# Patient Record
Sex: FEMALE | Race: OTHER
[De-identification: names, ages, dates, MRNs, and addresses within clinical notes are randomized per-mention and may not be internally consistent; named-entity substitution may affect disease eponyms.]

---

## 2020-05-06 ENCOUNTER — APPOINTMENT (OUTPATIENT)
Dept: HEMATOLOGY ONCOLOGY | Facility: CLINIC | Age: 60
End: 2020-05-06
Payer: COMMERCIAL

## 2020-05-06 ENCOUNTER — TRANSCRIPTION ENCOUNTER (OUTPATIENT)
Age: 60
End: 2020-05-06

## 2020-05-06 VITALS
WEIGHT: 192 LBS | TEMPERATURE: 98.6 F | SYSTOLIC BLOOD PRESSURE: 131 MMHG | OXYGEN SATURATION: 98 % | BODY MASS INDEX: 35.33 KG/M2 | HEIGHT: 62 IN | RESPIRATION RATE: 18 BRPM | HEART RATE: 71 BPM | DIASTOLIC BLOOD PRESSURE: 85 MMHG

## 2020-05-06 DIAGNOSIS — Z86.39 PERSONAL HISTORY OF OTHER ENDOCRINE, NUTRITIONAL AND METABOLIC DISEASE: ICD-10-CM

## 2020-05-06 DIAGNOSIS — Z80.1 FAMILY HISTORY OF MALIGNANT NEOPLASM OF TRACHEA, BRONCHUS AND LUNG: ICD-10-CM

## 2020-05-06 DIAGNOSIS — Z86.59 PERSONAL HISTORY OF OTHER MENTAL AND BEHAVIORAL DISORDERS: ICD-10-CM

## 2020-05-06 DIAGNOSIS — Z78.9 OTHER SPECIFIED HEALTH STATUS: ICD-10-CM

## 2020-05-06 PROCEDURE — 99205 OFFICE O/P NEW HI 60 MIN: CPT

## 2020-05-06 NOTE — PHYSICAL EXAM
[Fully active, able to carry on all pre-disease performance without restriction] : Status 0 - Fully active, able to carry on all pre-disease performance without restriction [Normal] : affect appropriate [de-identified] : colostomy with periinscion hernia  [de-identified] : mild hand foot syndrome

## 2020-05-06 NOTE — HISTORY OF PRESENT ILLNESS
[0 - No Distress] : Distress Level: 0 [de-identified] : This is a 60 y/o woman wiiht a hx fo stage 4 colon cancer , Kras wt, MSI stable, rectal and sigmoid primary , found to have peritoneal disease at time  of initial staging. \par s/p folfirinox avastin originally then irinotecan dropped for toxicity . S/p FOLFOX avastin, x6 months now on xeloda alone, avastin has been held due to hypertension. \par Patient underwent resection for the 2 primaries as well as debulking in oct 2018, with dr way at [place]. Pathology revealed, complete response in rectum but residual moderately differentiated adenocarcinoma of sigmoid, omentum positive for adeno and right ovary positive for metasatic colon cancer . No perforation was note but pos for perineuural invasion. \par rectum ypT1No\par sigmoid ypT3No M1c \par \par patient has been YOLIS , colonoscopy may 2019 one polyp. \par last ct scan negative for mets, + fatty liver feb 2020 \par  [de-identified] : Feeling well ,  had covid 19 but she never got sick at all! \par mild nausea at times but controlled with compazine or zofran \par \par occ loose stool \par cont on xeloda , some occ burning of hands and feet \par

## 2020-05-06 NOTE — REVIEW OF SYSTEMS
[Negative] : Allergic/Immunologic [FreeTextEntry7] : occ diarrhea  [FreeTextEntry9] : hand foot syndrome

## 2020-05-06 NOTE — REASON FOR VISIT
[Initial Consultation] : an initial consultation [FreeTextEntry2] : colon  and rectal cancer , on xeloda , new to this practice

## 2020-05-06 NOTE — ASSESSMENT
[FreeTextEntry1] : This is a 58 y/o woman with a hx of metastatic colorectal cancer, s/p hemicolectomy and debulking procedure. Kras wildtype, MSI stable \par s/p folfox(neoadjuvant)  and in joseph of avastin due to hypertension. Now on maintnace xeloda with out evidence of disease\par \par 1) colon cancer \par doing well YOLIS \par tolerating xeloda well \par repeat ct scan to confirm response \par may 18, 2020 going for repeat colonoscopy \par last cea normal repeat now \par \par \par 2) hand foot syndrome \par stable \par \par 3) vit d def \par cont vit d check level \par \par 4) hypertesnion \par well controlled \par follow up with pcp \par \par 5) incontiance \par cont "medication" prescribed by urology \par improved \par \par 6) neuropathy \par improved \par \par 7) iron def \par hb stable \par repeat levels \par \par 8) b12 def \par cont b12 oral \par repeat levels today \par \par 9) arm pain - improved with PT\par \par 10 health maintnance \par mammogram and pap up todate

## 2020-06-30 ENCOUNTER — APPOINTMENT (OUTPATIENT)
Dept: HEMATOLOGY ONCOLOGY | Facility: CLINIC | Age: 60
End: 2020-06-30
Payer: COMMERCIAL

## 2020-06-30 VITALS
RESPIRATION RATE: 18 BRPM | SYSTOLIC BLOOD PRESSURE: 139 MMHG | DIASTOLIC BLOOD PRESSURE: 87 MMHG | TEMPERATURE: 98.2 F | OXYGEN SATURATION: 98 % | BODY MASS INDEX: 34.6 KG/M2 | WEIGHT: 188 LBS | HEIGHT: 62 IN | HEART RATE: 79 BPM

## 2020-06-30 PROCEDURE — 99215 OFFICE O/P EST HI 40 MIN: CPT

## 2020-06-30 NOTE — ASSESSMENT
[FreeTextEntry1] : This is a 60 y/o woman with a hx of metastatic colorectal cancer, s/p hemicolectomy and debulking procedure. Kras wildtype, MSI stable \par s/p folfox(neoadjuvant)  and in joseph of avastin due to hypertension. Now on maintnace xeloda with out evidence of disease\par \par 1) colon cancer \par doing well overall no new symptoms \par tolerating xeloda well \par colonscopy - polyp found may 2020 \par ct scan june 2020 shows some incresae in RP nodes, ? if truly progression versus slice variaton \par petct ordered to further delineate whether this is progression , discussed possible false pos and false neg \par also discussed the need to follow on serial scans \par check cea , if increasing most likely progression \par get foundation testing for genetic mutations to help with treatment (MSI, KRAS< BRAF NRAF etc) \par discussed options for treatment on progression: including adding avastin to xeloda versus change to FOLFIRI versus egfr related treatment if kras and braf and nraf are wildtype\par \par \par \par 2) hand foot syndrome \par stable \par \par 3) vit d def \par cont vit d check level \par \par 4) hypertesnion \par well controlled \par follow up with pcp \par \par 5) incontiance \par cont "medication" prescribed by urology \par improved \par \par 6) neuropathy \par improved \par \par 7) iron def \par hb stable \par repeat levels \par \par 8) b12 def \par cont b12 oral \par repeat levels today \par \par 9) arm pain - improved with PT\par \par 10 health maintnance \par mammogram and pap up todate  \par \par dw patient and spouse at length

## 2020-06-30 NOTE — PHYSICAL EXAM
[Restricted in physically strenuous activity but ambulatory and able to carry out work of a light or sedentary nature] : Status 1- Restricted in physically strenuous activity but ambulatory and able to carry out work of a light or sedentary nature, e.g., light house work, office work [Obese] : obese [Normal] : affect appropriate [de-identified] : colostomy with  hernia  [de-identified] : mild hand foot syndrome

## 2020-06-30 NOTE — REVIEW OF SYSTEMS
[Negative] : Allergic/Immunologic [FreeTextEntry2] : weight loss  [FreeTextEntry7] : occ diarrhea is stable  [FreeTextEntry9] : back pain resolved

## 2020-06-30 NOTE — HISTORY OF PRESENT ILLNESS
[de-identified] : This is a 60 y/o woman wiiht a hx fo stage 4 colon cancer , Kras wt, MSI stable, rectal and sigmoid primary , found to have peritoneal disease at time  of initial staging. \par s/p folfirinox avastin originally then irinotecan dropped for toxicity . S/p FOLFOX avastin, x6 months now on xeloda alone, avastin has been held due to hypertension. \par Patient underwent resection for the 2 primaries as well as debulking in oct 2018, with dr way at [place]. Pathology revealed, complete response in rectum but residual moderately differentiated adenocarcinoma of sigmoid, omentum positive for adeno and right ovary positive for metasatic colon cancer . No perforation was note but pos for perineuural invasion. \par rectum ypT1No\par sigmoid ypT3No M1c \par \par patient has been YOLIS , colonoscopy may 2019 one polyp. \par last ct scan negative for mets, + fatty liver feb 2020 \par  [de-identified] : cea is 3.8 up from 2  at last visit , today is pending \par ct scan june 2020 shows slight increase in adenopathy 8 mm to 1.1 cm \par did have some pain in left abd pain but was doing leg raising , better now \par loosing weight poor appetite , pot helping a lot \par saw dr way . \par \par vominting 2 times per week , just weeks off \par diarrhea stable\par stamina better  [3 - Distress Level] : Distress Level: 3

## 2020-07-23 ENCOUNTER — APPOINTMENT (OUTPATIENT)
Dept: HEMATOLOGY ONCOLOGY | Facility: CLINIC | Age: 60
End: 2020-07-23
Payer: COMMERCIAL

## 2020-07-23 VITALS
SYSTOLIC BLOOD PRESSURE: 142 MMHG | WEIGHT: 184.25 LBS | OXYGEN SATURATION: 99 % | DIASTOLIC BLOOD PRESSURE: 84 MMHG | RESPIRATION RATE: 18 BRPM | HEART RATE: 77 BPM | BODY MASS INDEX: 33.9 KG/M2 | TEMPERATURE: 98.1 F | HEIGHT: 62 IN

## 2020-07-23 PROCEDURE — 99214 OFFICE O/P EST MOD 30 MIN: CPT

## 2020-07-23 NOTE — REASON FOR VISIT
[Follow-Up Visit] : a follow-up [Spouse] : spouse [FreeTextEntry2] : colon cancer here to review scans , no new complaints

## 2020-07-23 NOTE — ASSESSMENT
[FreeTextEntry1] : This is a 60 y/o woman with a hx of metastatic colorectal cancer, s/p hemicolectomy and debulking procedure. Kras wildtype, MSI stable \par s/p folfox(neoadjuvant)  and in joseph of avastin due to hypertension. Now on maintnace xeloda with out evidence of disease\par \par 1) colon cancer \par doing well overall no new symptoms \par tolerating xeloda well \par colonscopy - polyp found may 2020 \par ct scan june 2020 revealing for slight increase in RP nodes \par petct july 2020 negative for any uptake \par cea is increasing consistant with progression of disease \par reviewed foundation testing which found a BRCA mutation , should respond well to platinums and also possible parp(? trial) \par the invitae testiing does not indcate a brca germline, but did find 2 RODOLFO and one MSH6. (all are VUS) \par dw patient options : continued close observation, add avastin to xeloda, change to oxaliplatin, change to folfiri \par need to get all path from prior surgeries as well  called yair \par cont xeloda add avastin, dw patient the risk of hypertension, dvt /pe other clotting events , death , obstruciton/perforation , fatal bleeding \par repeat cea today and rest of labs 2) hand foot syndrome \par stable \par \par 3) vit d def \par cont vit d check level \par \par 4) hypertesnion \par well controlled \par follow up with pcp \par \par 5) incontiance \par cont "medication" prescribed by urology \par improved \par \par 6) neuropathy \par improved \par \par 7) iron def \par hb stable \par repeat levels \par \par 8) b12 def \par cont b12 oral \par repeat levels today \par \par 9) arm pain - improved with PT\par \par 10 health maintnance \par mammogram and pap up todate  \par \par dw patient and spouse at length , follow up one week for avastin and 4 weeks md and avastin \par covid testing today

## 2020-07-23 NOTE — PHYSICAL EXAM
[Restricted in physically strenuous activity but ambulatory and able to carry out work of a light or sedentary nature] : Status 1- Restricted in physically strenuous activity but ambulatory and able to carry out work of a light or sedentary nature, e.g., light house work, office work [Obese] : obese [Normal] : affect appropriate [de-identified] : colostomy with  hernia  [de-identified] : mild hand foot syndrome

## 2020-07-23 NOTE — HISTORY OF PRESENT ILLNESS
[0 - No Distress] : Distress Level: 0 [de-identified] : This is a 58 y/o woman wiiht a hx fo stage 4 colon cancer , Kras wt, MSI stable, rectal and sigmoid primary , found to have peritoneal disease at time  of initial staging. \par s/p folfirinox avastin originally then irinotecan dropped for toxicity . S/p FOLFOX avastin, x6 months now on xeloda alone, avastin has been held due to hypertension. \par Patient underwent resection for the 2 primaries as well as debulking in oct 2018, with dr way at [place]. Pathology revealed, complete response in rectum but residual moderately differentiated adenocarcinoma of sigmoid, omentum positive for adeno and right ovary positive for metasatic colon cancer . No perforation was note but pos for perineuural invasion. \par rectum ypT1No\par sigmoid ypT3No M1c \par \par patient has been YOLIS , colonoscopy may 2019 one polyp. \par last ct scan negative for mets, + fatty liver feb 2020 \par \par cea is 3.8 which is up in may \par ct scan june 2020 shows slight increase in adenopathy 8 mm to 1.1 cm  [de-identified] : PETCT - done in july 2020 - no uptake in nodes in retroperitoneum \par \par cea is increasing to 6 \par \par Patient denies any new complaints , still having some stomach upset from time to time and some fatigue \par but no new pain in abd or back \par \par Foundation testing shows BRCA 2 mutation and also p53 mutation \par \par [facility] - RODOLFO mutation x2(2 diff heterogenous mutations uncertain sig ) , MSH6 heterogenous (undetermined sig)

## 2020-08-20 ENCOUNTER — APPOINTMENT (OUTPATIENT)
Dept: HEMATOLOGY ONCOLOGY | Facility: CLINIC | Age: 60
End: 2020-08-20
Payer: COMMERCIAL

## 2020-08-20 VITALS
BODY MASS INDEX: 32.37 KG/M2 | HEART RATE: 81 BPM | WEIGHT: 177 LBS | TEMPERATURE: 98 F | OXYGEN SATURATION: 98 % | SYSTOLIC BLOOD PRESSURE: 126 MMHG | DIASTOLIC BLOOD PRESSURE: 70 MMHG

## 2020-08-20 DIAGNOSIS — Z00.00 ENCOUNTER FOR GENERAL ADULT MEDICAL EXAMINATION W/OUT ABNORMAL FINDINGS: ICD-10-CM

## 2020-08-20 PROCEDURE — 99214 OFFICE O/P EST MOD 30 MIN: CPT

## 2020-08-20 NOTE — REVIEW OF SYSTEMS
[Negative] : Heme/Lymph [FreeTextEntry2] : increased fatigue  [FreeTextEntry7] : abd cramping , nausea  [FreeTextEntry9] : restless legs

## 2020-08-20 NOTE — REASON FOR VISIT
[Follow-Up Visit] : a follow-up [Spouse] : spouse [FreeTextEntry2] : colon cancer  on xeloda and avastin , here for clearence , complain of nausea

## 2020-08-20 NOTE — PHYSICAL EXAM
[Restricted in physically strenuous activity but ambulatory and able to carry out work of a light or sedentary nature] : Status 1- Restricted in physically strenuous activity but ambulatory and able to carry out work of a light or sedentary nature, e.g., light house work, office work [Obese] : obese [Normal] : grossly intact [de-identified] : colostomy with  hernia  [de-identified] : mild hand foot syndrome

## 2020-08-20 NOTE — ASSESSMENT
[FreeTextEntry1] : This is a 58 y/o woman with a hx of metastatic colorectal cancer, s/p hemicolectomy and debulking procedure. Kras wildtype, MSI stable \par s/p folfox(neoadjuvant)  and in joseph of avastin due to hypertension. Now on maintnace xeloda with out evidence of disease\par foundation testing which found a BRCA mutation \par the [facility] testiing does not indcate a brca germline, but did find 2 RODOLFO and one MSH6. (all are VUS) \par \par 1) colon cancer \par doing well overall no new symptoms \par colonscopy - polyp found may 2020 \par ct scan june 2020 revealing for slight increase in RP nodes \par petct july 2020 negative for any uptake \par cea is increased to 10 last visit , repeat today \par started avastin with xeloda last visit today due for cycle 2 \par tolerating well so far \par proceed with avastin cycle 2 \par \par 2) hand foot syndrome \par stable \par \par 3) vit d def \par cont vit d check level \par \par 4) hypertesnion \par well controlled \par moniters at home , will follow closely on avastin \par \par 5) incontiance \par stable \par \par 6) neuropathy \par stable \par \par 7) iron def \par hb stable \par ferritin level dropping last check \par start venofer with avastin and take iron every other day po \par \par 8) b12 def \par cont b12 oral \par \par \par 9) health maintnance \par mammogram due for ,  pap up todate  \par \par 10) restless legs \par start iron \par \par 11) anticipatory nausea \par start ativan \par \par dw patient and spouse at length \par follow up in 3 weeks

## 2020-08-20 NOTE — HISTORY OF PRESENT ILLNESS
[0 - No Distress] : Distress Level: 0 [Cycle: ___] : Cycle: [unfilled] [de-identified] : This is a 60 y/o woman wiiht a hx fo stage 4 colon cancer , Kras wt, MSI stable, rectal and sigmoid primary , found to have peritoneal disease at time  of initial staging. \par s/p folfirinox avastin originally then irinotecan dropped for toxicity . S/p FOLFOX avastin, x6 months now on xeloda alone, avastin has been held due to hypertension. \par Patient underwent resection for the 2 primaries as well as debulking in oct 2018, with dr way at [place]. Pathology revealed, complete response in rectum but residual moderately differentiated adenocarcinoma of sigmoid, omentum positive for adeno and right ovary positive for metasatic colon cancer . No perforation was note but pos for perineuural invasion. \par rectum ypT1No\par sigmoid ypT3No M1c \par \par patient has been YOLIS , colonoscopy may 2019 one polyp. \par last ct scan negative for mets, + fatty liver feb 2020 \par \par cea is 3.8 which is up in may \par ct scan june 2020 shows slight increase in adenopathy 8 mm to 1.1 cm \par \par PETCT - done in july 2020 - no uptake in nodes in retroperitoneum \par Foundation testing shows BRCA 2 mutation and also p53 mutation \par \par [facility] - RODOLFO mutation x2(2 diff heterogenous mutations uncertain sig ) , MSH6 heterogenous (undetermined sig)  [de-identified] : here for cycle 2 of avastin \par feeling well over all \par back pain and joint pain in am , taking naprosyn and tylenol \par no bleeding \par bp stable \par abd pain better \par one day nausea after avastin , but none at home \par last night felt nausea \par contninues on xeloda \par order ativan

## 2020-09-10 ENCOUNTER — APPOINTMENT (OUTPATIENT)
Dept: HEMATOLOGY ONCOLOGY | Facility: CLINIC | Age: 60
End: 2020-09-10
Payer: COMMERCIAL

## 2020-09-10 VITALS
DIASTOLIC BLOOD PRESSURE: 68 MMHG | WEIGHT: 180 LBS | OXYGEN SATURATION: 98 % | BODY MASS INDEX: 33.13 KG/M2 | SYSTOLIC BLOOD PRESSURE: 118 MMHG | TEMPERATURE: 97.9 F | HEIGHT: 62 IN | HEART RATE: 75 BPM

## 2020-09-10 PROCEDURE — 99214 OFFICE O/P EST MOD 30 MIN: CPT

## 2020-09-10 NOTE — HISTORY OF PRESENT ILLNESS
[Cycle: ___] : Cycle: [unfilled] [0 - No Distress] : Distress Level: 0 [de-identified] : worse itching bilateral shins using barrier cream and oral benadryl , tried cerve and lots of other creams \par no other areas of rash or itching \par solifenacin for urine , not helping much \par bp is great today \par cea is better  last time \par vit d 54, ferritin 46 \par dentist last week infection , now on antibiotics, no pain , completing now \par \par \par \par  [de-identified] : This is a 58 y/o woman wiiht a hx fo stage 4 colon cancer , Kras wt, MSI stable, rectal and sigmoid primary , found to have peritoneal disease at time  of initial staging. \par s/p folfirinox avastin originally then irinotecan dropped for toxicity . S/p FOLFOX avastin, x6 months now on xeloda alone, avastin has been held due to hypertension. \par Patient underwent resection for the 2 primaries as well as debulking in oct 2018, with dr way at [place]. Pathology revealed, complete response in rectum but residual moderately differentiated adenocarcinoma of sigmoid, omentum positive for adeno and right ovary positive for metasatic colon cancer . No perforation was note but pos for perineuural invasion. \par rectum ypT1No\par sigmoid ypT3No M1c \par \par patient has been YOLIS , colonoscopy may 2019 one polyp. \par last ct scan negative for mets, + fatty liver feb 2020 \par \par cea is 3.8 which is up in may \par ct scan june 2020 shows slight increase in adenopathy 8 mm to 1.1 cm \par \par PETCT - done in july 2020 - no uptake in nodes in retroperitoneum \par Foundation testing shows BRCA 2 mutation and also p53 mutation \par \par [facility] - RODOLFO mutation x2(2 diff heterogenous mutations uncertain sig ) , MSH6 heterogenous (undetermined sig)

## 2020-09-10 NOTE — REVIEW OF SYSTEMS
[Negative] : Endocrine [Skin Rash] : skin rash [Fatigue] : fatigue [Fever] : no fever [FreeTextEntry7] : abd cramping , nausea  [FreeTextEntry2] : increased fatigue  [FreeTextEntry9] : restless legs

## 2020-09-10 NOTE — PHYSICAL EXAM
[Restricted in physically strenuous activity but ambulatory and able to carry out work of a light or sedentary nature] : Status 1- Restricted in physically strenuous activity but ambulatory and able to carry out work of a light or sedentary nature, e.g., light house work, office work [Obese] : obese [Normal] : affect appropriate [de-identified] : mild hand foot syndrome  [de-identified] : colostomy with  hernia  [de-identified] : follicular rash with areas of excoriation on shins bilat right worse then left

## 2020-09-10 NOTE — ASSESSMENT
[FreeTextEntry1] : This is a 58 y/o woman with a hx of metastatic colorectal cancer, s/p hemicolectomy and debulking procedure. Kras wildtype, MSI stable \par s/p folfox(neoadjuvant)  and in joseph of avastin due to hypertension. Now on maintnace xeloda with out evidence of disease\par foundation testing which found a BRCA mutation \par the inv[facility]ae testiing does not indcate a brca germline, but did find 2 RODOLFO and one MSH6. (all are VUS) \par \par 1) colon cancer \par doing well overall no new symptoms \par colonscopy - polyp found may 2020 \par ct scan june 2020 revealing for slight increase in RP nodes \par petct july 2020 negative for any uptake \par avastin was added to xeloda for the slight progression \par cea improved from 10 to 4 with addition of avastin \par proceed with avastin cycle 3 \par cont xeloda \par plan for repeat scan after next cycle \par again educated patient and family regarding the side effects of avastin- risk of bleeding wound healing issues hypertension and thrombosis \par repeat cea today \par \par \par 2) hand foot syndrome \par stable \par \par 3) vit d def \par cont vit d check level \par \par 4) hypertesnion \par well controlled , stable today \par moniters at home , will follow closely on avastin \par \par 5) incontiance \par continues despite meds \par patietn advised to see dr wilfred mccray urology \par \par 6) neuropathy \par stable \par \par 7) iron def \par hb stable \par ferritin is 80's , on oral iron , toleraing well  \par \par 8) b12 def \par cont b12 oral \par \par \par 9) health maintnance \par mammogram due for ,  pap up todate  \par \par 10) restless legs \par on iron \par start mirapex \par \par 11) anticipatory nausea \par start ativan \par \par 12) rash \par seems to be a type of follicultiis \par start higher potency steroid cream and also start cleocin lotion for any infectious componant \par likley due to avastin \par if fails to improve will need derm eval \par \par dw patient and spouse at length \par follow up in 3 weeks

## 2020-09-11 LAB — CEA SERPL-MCNC: 3.1 NG/ML

## 2020-10-01 ENCOUNTER — APPOINTMENT (OUTPATIENT)
Dept: HEMATOLOGY ONCOLOGY | Facility: CLINIC | Age: 60
End: 2020-10-01
Payer: COMMERCIAL

## 2020-10-01 VITALS
SYSTOLIC BLOOD PRESSURE: 120 MMHG | HEIGHT: 62 IN | WEIGHT: 180 LBS | HEART RATE: 77 BPM | OXYGEN SATURATION: 97 % | RESPIRATION RATE: 15 BRPM | TEMPERATURE: 97.1 F | DIASTOLIC BLOOD PRESSURE: 70 MMHG | BODY MASS INDEX: 33.13 KG/M2

## 2020-10-01 PROCEDURE — 99214 OFFICE O/P EST MOD 30 MIN: CPT

## 2020-10-01 NOTE — ASSESSMENT
[FreeTextEntry1] : This is a 60 y/o woman with a hx of metastatic colorectal cancer, s/p hemicolectomy and debulking procedure. Kras wildtype, MSI stable \par s/p folfox(neoadjuvant)  and in joseph of avastin due to hypertension. Now on maintnace xeloda with out evidence of disease\par foundation testing which found a BRCA mutation \par the inv[facility]ae testiing does not indcate a brca germline, but did find 2 RODOLFO and one MSH6. (all are VUS) \par \par 1) colon cancer \par doing well overall no new symptoms \par colonscopy - polyp found may 2020 \par ct scan june 2020 revealing for slight increase in RP nodes \par petct july 2020 negative for any uptake \par avastin was added to xeloda for the slight progression \par cea improved from 10 to 4 with addition of avastin , now cea is normal \par repeat cea today \par repeat scans after this cycle before next cycle to verify response \par proceed with avastin cycle 4 today \par cont xeloda \par \par \par \par 2) hand foot syndrome \par stable \par \par 3) vit d def \par cont vit d check level \par \par 4) hypertesnion \par well controlled , stable today \par moniter  at home , will follow closely on avastin \par \par 5) incontiance \par continues despite meds \par patient  advised to see dr wilfred mccray urology \par \par 6) neuropathy \par stable \par \par 7) iron def \par hb stable \par ferritin is 80's , on oral iron , toleraing well  \par \par 8) b12 def \par cont b12 oral \par \par 9) health maintnance \par mammogram due for ,  pap up todate  \par \par 10) restless legs \par on iron \par start mirapex \par \par 11) anticipatory nausea \par start ativan \par \par 12) rash \par seems to be a type of follicultiis \par cont topical steroids and antibitoics \par \par dw patient and spouse at length \par follow up in 3 weeks

## 2020-10-01 NOTE — REASON FOR VISIT
[Follow-Up Visit] : a follow-up [Spouse] : spouse [FreeTextEntry2] : colon cancer  on xeloda and avastin , here for clearence , complain of nausea and rash

## 2020-10-01 NOTE — PHYSICAL EXAM
[Restricted in physically strenuous activity but ambulatory and able to carry out work of a light or sedentary nature] : Status 1- Restricted in physically strenuous activity but ambulatory and able to carry out work of a light or sedentary nature, e.g., light house work, office work [Obese] : obese [Normal] : affect appropriate [de-identified] : colostomy with  hernia  [de-identified] : mild hand foot syndrome  [de-identified] : hyperpitgmnted areas no erythema or drainage shins bilat

## 2020-10-01 NOTE — HISTORY OF PRESENT ILLNESS
[Cycle: ___] : Cycle: [unfilled] [0 - No Distress] : Distress Level: 0 [de-identified] : This is a 60 y/o woman wiiht a hx fo stage 4 colon cancer , Kras wt, MSI stable, rectal and sigmoid primary , found to have peritoneal disease at time  of initial staging. \par s/p folfirinox avastin originally then irinotecan dropped for toxicity . S/p FOLFOX avastin, x6 months now on xeloda alone, avastin has been held due to hypertension. \par Patient underwent resection for the 2 primaries as well as debulking in oct 2018, with dr way at [place]. Pathology revealed, complete response in rectum but residual moderately differentiated adenocarcinoma of sigmoid, omentum positive for adeno and right ovary positive for metasatic colon cancer . No perforation was note but pos for perineuural invasion. \par rectum ypT1No\par sigmoid ypT3No M1c \par \par patient has been YOLIS , colonoscopy may 2019 one polyp. \par last ct scan negative for mets, + fatty liver feb 2020 \par \par cea is 3.8 which is up in may \par ct scan june 2020 shows slight increase in adenopathy 8 mm to 1.1 cm \par \par PETCT - done in july 2020 - no uptake in nodes in retroperitoneum \par Foundation testing shows BRCA 2 mutation and also p53 mutation \par \par [facility] - RODOLFO mutation x2(2 diff heterogenous mutations uncertain sig ) , MSH6 heterogenous (undetermined sig) \par \par \par sept 2020 \par vit d 54, ferritin 46 \par  [de-identified] : rash is better  seeing derm next week  tues \par still ithcing a bit , taking cream \par monday will talk to dentist \par tomorrow mammogram \par urine frequency \par bp has been fine \par off antibiotics \par no new pain \par no bleeding  of stool \par cea last month is now normal \par \par \par \par

## 2020-10-22 ENCOUNTER — APPOINTMENT (OUTPATIENT)
Dept: HEMATOLOGY ONCOLOGY | Facility: CLINIC | Age: 60
End: 2020-10-22
Payer: COMMERCIAL

## 2020-10-22 VITALS
RESPIRATION RATE: 18 BRPM | OXYGEN SATURATION: 97 % | WEIGHT: 179.38 LBS | HEIGHT: 62 IN | SYSTOLIC BLOOD PRESSURE: 121 MMHG | HEART RATE: 97 BPM | DIASTOLIC BLOOD PRESSURE: 80 MMHG | TEMPERATURE: 98.8 F | BODY MASS INDEX: 33.01 KG/M2

## 2020-10-22 PROCEDURE — 99214 OFFICE O/P EST MOD 30 MIN: CPT

## 2020-10-22 PROCEDURE — 99072 ADDL SUPL MATRL&STAF TM PHE: CPT

## 2020-10-22 NOTE — ASSESSMENT
[FreeTextEntry1] : This is a 58 y/o woman with a hx of metastatic colorectal cancer, s/p hemicolectomy and debulking procedure. Kras wildtype, MSI stable \par s/p folfox(neoadjuvant)  and in joseph of avastin due to hypertension. Now on maintnace xeloda with out evidence of disease\par foundation testing which found a BRCA mutation \par the invitae testiing does not indcate a brca germline, but did find 2 RODOLFO and one MSH6. (all are VUS) \par \par 1) colon cancer \par doing well , no new symptoms \par colonoscopy - polyp found may 2020 \par ct scan june 2020 revealing for slight increase in RP nodes \par petct july 2020 negative for any uptake \par avastin was added to xeloda for the slight progression \par cea improved from 10 to  now normal \par repeat cea today \par repeat scans next week  10/29/20 \par hold next dose for dental extraction on 11.20 .2020 \par tolerating chemo well discussed some of side effects \par cont xeloda \par proceed with avastin cycle 6 today \par \par 2) hand foot syndrome \par stable \par \par 3) vit d def \par cont vit d  \par \par 4) hypertension \par well controlled , stable today \par monitor  at home , will follow closely on Avastin \par \par 5) incontinence \par continues despite meds \par pt is scheduled to see Dr Flowers in Urology\par \par 6) neuropathy \par stable \par \par 7) iron def \par hb stable \par ferritin is 80's , on oral iron , tolerating well  \par \par 8) b12 def \par cont b12 oral \par \par 9) health maintenance \par mammogram is up to date,  pap up to date  \par \par 10) restless legs \par on iron \par start mirapex \par improved\par \par 11) anticipatory nausea \par start ativan \par cont sea bands \par cont zofran \par \par 12) rash \par folliculitis , s.p derm eval \par cont topical steroids and antibitoics \par \par d/w patient and spouse at length \par follow up in 3 weeks

## 2020-10-22 NOTE — PHYSICAL EXAM
[Restricted in physically strenuous activity but ambulatory and able to carry out work of a light or sedentary nature] : Status 1- Restricted in physically strenuous activity but ambulatory and able to carry out work of a light or sedentary nature, e.g., light house work, office work [Obese] : obese [Normal] : affect appropriate [de-identified] : colostomy, clean, functioning, surrounding skin intact, with  hernia  [de-identified] : mild hand foot syndrome  [de-identified] : hyperpitgmnted areas no erythema or drainage shins bilat

## 2020-10-22 NOTE — HISTORY OF PRESENT ILLNESS
[Cycle: ___] : Cycle: [unfilled] [0 - No Distress] : Distress Level: 0 [de-identified] : This is a 60 y/o woman wiiht a hx fo stage 4 colon cancer , Kras wt, MSI stable, rectal and sigmoid primary , found to have peritoneal disease at time  of initial staging. \par s/p folfirinox avastin originally then irinotecan dropped for toxicity . S/p FOLFOX avastin, x6 months now on xeloda alone, avastin has been held due to hypertension. \par Patient underwent resection for the 2 primaries as well as debulking in oct 2018, with dr way at [place]. Pathology revealed, complete response in rectum but residual moderately differentiated adenocarcinoma of sigmoid, omentum positive for adeno and right ovary positive for metasatic colon cancer . No perforation was note but pos for perineuural invasion. \par rectum ypT1No\par sigmoid ypT3No M1c \par \par patient has been YOLIS , colonoscopy may 2019 one polyp. \par last ct scan negative for mets, + fatty liver feb 2020 \par \par cea is 3.8 which is up in may \par ct scan june 2020 shows slight increase in adenopathy 8 mm to 1.1 cm \par \par PETCT - done in july 2020 - no uptake in nodes in retroperitoneum \par Foundation testing shows BRCA 2 mutation and also p53 mutation \par \par [facility] - RODOLFO mutation x2(2 diff heterogenous mutations uncertain sig ) , MSH6 heterogenous (undetermined sig) \par \par \par sept 2020 \par vit d 54, ferritin 46 \par  [de-identified] : \par rash is better  saw derm, was Rx'd topical clindaycin and ammonium lactate 12% cream\par Dentist is scheduled for 11/06/20 for an extraction and laser frenulectomy\par mammogram was done at [facility] in [place] on 10/02/20 and was negative\par urine has been okay without any new complaints \par bp has been fine \par no new pain \par no bleeding  of stool, bowel movements have been  more watery than usual, same in frequency though\par cea 09/09/2020 normal \par Next CT (chest/ab/pel) is scheduled for 10/29/20 at [facility] in [place]\par On 10/28/2020 she is scheduled to see Urology, Dr. German Flowers for urinary incontinence, which she has had since the colon surgery\par appeitite bad \par weight was down not eating well  1week and half , severe nausea  tried sea bands and worked \par \par

## 2020-10-22 NOTE — REVIEW OF SYSTEMS
[Fatigue] : fatigue [Skin Rash] : skin rash [Negative] : Allergic/Immunologic [Fever] : no fever [FreeTextEntry2] : increased fatigue  [FreeTextEntry7] : abd cramping , nausea occasional vomiting, taking Zofran and Compazine prn [FreeTextEntry9] : restless legs  better [de-identified] : improved  [de-identified] : pain in feet related to neuropathy, worse during the day and with activity

## 2020-10-23 LAB — CEA SERPL-MCNC: 3.4 NG/ML

## 2020-11-18 ENCOUNTER — APPOINTMENT (OUTPATIENT)
Dept: HEMATOLOGY ONCOLOGY | Facility: CLINIC | Age: 60
End: 2020-11-18
Payer: COMMERCIAL

## 2020-11-18 VITALS
DIASTOLIC BLOOD PRESSURE: 70 MMHG | SYSTOLIC BLOOD PRESSURE: 122 MMHG | TEMPERATURE: 98.7 F | WEIGHT: 179 LBS | HEIGHT: 62 IN | HEART RATE: 93 BPM | RESPIRATION RATE: 18 BRPM | OXYGEN SATURATION: 99 % | BODY MASS INDEX: 32.94 KG/M2

## 2020-11-18 DIAGNOSIS — E66.01 MORBID (SEVERE) OBESITY DUE TO EXCESS CALORIES: ICD-10-CM

## 2020-11-18 PROCEDURE — 99214 OFFICE O/P EST MOD 30 MIN: CPT

## 2020-11-18 NOTE — HISTORY OF PRESENT ILLNESS
[de-identified] : This is a 58 y/o woman wiiht a hx fo stage 4 colon cancer , Kras wt, MSI stable, rectal and sigmoid primary , found to have peritoneal disease at time  of initial staging. \par s/p folfirinox avastin originally then irinotecan dropped for toxicity . S/p FOLFOX avastin, x6 months now on xeloda alone, avastin has been held due to hypertension. \par Patient underwent resection for the 2 primaries as well as debulking in oct 2018, with dr way at [place]. Pathology revealed, complete response in rectum but residual moderately differentiated adenocarcinoma of sigmoid, omentum positive for adeno and right ovary positive for metasatic colon cancer . No perforation was note but pos for perineuural invasion. \par rectum ypT1No\par sigmoid ypT3No M1c \par \par patient has been YOLIS , colonoscopy may 2019 one polyp. \par last ct scan negative for mets, + fatty liver feb 2020 \par \par cea is 3.8 which is up in may \par ct scan june 2020 shows slight increase in adenopathy 8 mm to 1.1 cm \par \par PETCT - done in july 2020 - no uptake in nodes in retroperitoneum \par Foundation testing shows BRCA 2 mutation and also p53 mutation \par \par [facility] - RODOLFO mutation x2(2 diff heterogenous mutations uncertain sig ) , MSH6 heterogenous (undetermined sig) \par \par \par sept 2020 \par vit d 54, ferritin 46 \par mammogram was done at [facility] in [place] on 10/02/20 and was negative [de-identified] : saw urology and started on myrbetric urinary symptoms better \par cea is normal in oct 2020 \par no new symptoms \par ct scan in nov 2020 - stable RP nodes , new multiple nodules up to 4 mm in lungs bilaterally , compared to feb 2020 ( no chest done in june 2020) \par having tooth extracted on nov 20 , 2020 \par has been off chemo due to extraction \par \par

## 2020-11-18 NOTE — PHYSICAL EXAM
[Restricted in physically strenuous activity but ambulatory and able to carry out work of a light or sedentary nature] : Status 1- Restricted in physically strenuous activity but ambulatory and able to carry out work of a light or sedentary nature, e.g., light house work, office work [Obese] : obese [Normal] : affect appropriate [de-identified] : colostomy with  hernia  [de-identified] : mild hand foot syndrome  [de-identified] : hyperpitgmnted areas no erythema or drainage shins bilat

## 2020-11-18 NOTE — ASSESSMENT
[FreeTextEntry1] : This is a 60 y/o woman with a hx of metastatic colorectal cancer, s/p hemicolectomy and debulking procedure. Kras wildtype, MSI stable \par s/p folfox(neoadjuvant)  and in joseph of avastin due to hypertension. Now on maintnace xeloda with out evidence of disease\par foundation testing which found a BRCA mutation \par the invitae testiing does not indcate a brca germline, but did find 2 RODOLFO and one MSH6. (all are VUS) \par \par 1) colon cancer \par doing well , no new symptoms \par colonoscopy - polyp found may 2020 \par petct july 2020 negative \par avastin was added to xeloda for the slight progression (in rp nodes in june) \par cea improved from 10 to  now normal (since starting avastin) \par repeat cea today \par chemo is on hold for dental extraction \par ct scan reviewed from nov 2020 at length - the RP nodes are stable but there are new lung nodules , they are very small but are most consistant with mets, ct scan of chest was not done in june so it is possiible they could have been present at that time , petct may not have picked up bc they are so small \par dw patient patient and spouse options- including - cont current regimen  and repeat scan in 2 more doses (beginning of jan 2021) , versus change chemo to  add oxaliplatin(change to capeox) \par \par \par 2) hand foot syndrome \par stable \par \par 3) vit d def \par cont vit d  \par \par 4) hypertension \par stable \par monitor  at home , will follow closely on Avastin \par \par 5) incontinence \par followed up with dr mccray \par now on myrbetric improved \par will follow up with him soon \par \par 6) neuropathy \par stable \par \par 7) iron def \par hb stable \par ferritin is 80's , on oral iron , tolerating well  \par \par 8) b12 def \par cont b12 oral \par \par 9) health maintenance \par mammogram is up to date,  pap up to date  \par \par 10) restless legs \par on iron \par cont mirapex \par improved\par \par 11) anticipatory nausea \par cont sea bands \par \par 12) rash \par folliculitis , s.p derm eval \par cont topical steroids and antibitoics \par \par d/w patient and spouse at length \par follow up in 3 weeks

## 2020-11-23 LAB
CEA SERPL-MCNC: 3.3 NG/ML
FERRITIN SERPL-MCNC: 83 NG/ML
FOLATE SERPL-MCNC: 8 NG/ML
VIT B12 SERPL-MCNC: 757 PG/ML

## 2020-12-17 NOTE — PHYSICAL EXAM
[Restricted in physically strenuous activity but ambulatory and able to carry out work of a light or sedentary nature] : Status 1- Restricted in physically strenuous activity but ambulatory and able to carry out work of a light or sedentary nature, e.g., light house work, office work [Obese] : obese [Normal] : affect appropriate [de-identified] : colostomy with  hernia  [de-identified] : mild hand foot syndrome  [de-identified] : hyperpitgmnted areas no erythema or drainage shins bilat

## 2020-12-17 NOTE — HISTORY OF PRESENT ILLNESS
[de-identified] : This is a 60 y/o woman wiiht a hx fo stage 4 colon cancer , Kras wt, MSI stable, rectal and sigmoid primary , found to have peritoneal disease at time  of initial staging. \par s/p folfirinox avastin originally then irinotecan dropped for toxicity . S/p FOLFOX avastin, x6 months now on xeloda alone, avastin has been held due to hypertension. \par Patient underwent resection for the 2 primaries as well as debulking in oct 2018, with dr way at [place]. Pathology revealed, complete response in rectum but residual moderately differentiated adenocarcinoma of sigmoid, omentum positive for adeno and right ovary positive for metasatic colon cancer . No perforation was note but pos for perineuural invasion. \par rectum ypT1No\par sigmoid ypT3No M1c \par \par patient has been YOLIS , colonoscopy may 2019 one polyp. \par last ct scan negative for mets, + fatty liver feb 2020 \par \par cea is 3.8 which is up in may \par ct scan june 2020 shows slight increase in adenopathy 8 mm to 1.1 cm \par \par PETCT - done in july 2020 - no uptake in nodes in retroperitoneum \par Foundation testing shows BRCA 2 mutation and also p53 mutation \par \par Inv[facility] - RODOLFO mutation x2(2 diff heterogenous mutations uncertain sig ) , MSH6 heterogenous (undetermined sig) \par \par \par sept 2020 \par vit d 54, ferritin 46 \par mammogram was done at [facility] in [place] on 10/02/20 and was negative [de-identified] : saw urology and started on myrbetric urinary symptoms better \par cea is normal in oct 2020 \par no new symptoms \par ct scan in nov 2020 - stable RP nodes , new multiple nodules up to 4 mm in lungs bilaterally , compared to feb 2020 ( no chest done in june 2020) \par having tooth extracted on nov 20 , 2020 \par has been off chemo due to extraction \par \par

## 2020-12-18 ENCOUNTER — APPOINTMENT (OUTPATIENT)
Dept: HEMATOLOGY ONCOLOGY | Facility: CLINIC | Age: 60
End: 2020-12-18
Payer: COMMERCIAL

## 2020-12-21 ENCOUNTER — APPOINTMENT (OUTPATIENT)
Dept: HEMATOLOGY ONCOLOGY | Facility: CLINIC | Age: 60
End: 2020-12-21
Payer: COMMERCIAL

## 2020-12-21 VITALS
HEART RATE: 107 BPM | OXYGEN SATURATION: 96 % | HEIGHT: 62 IN | WEIGHT: 174 LBS | BODY MASS INDEX: 32.02 KG/M2 | TEMPERATURE: 98.5 F | RESPIRATION RATE: 18 BRPM

## 2020-12-21 VITALS — SYSTOLIC BLOOD PRESSURE: 130 MMHG | DIASTOLIC BLOOD PRESSURE: 60 MMHG

## 2020-12-21 PROCEDURE — 99214 OFFICE O/P EST MOD 30 MIN: CPT

## 2020-12-21 PROCEDURE — 99072 ADDL SUPL MATRL&STAF TM PHE: CPT

## 2020-12-21 NOTE — PHYSICAL EXAM
[Restricted in physically strenuous activity but ambulatory and able to carry out work of a light or sedentary nature] : Status 1- Restricted in physically strenuous activity but ambulatory and able to carry out work of a light or sedentary nature, e.g., light house work, office work [Obese] : obese [Normal] : affect appropriate [de-identified] : colostomy with  hernia  [de-identified] : mild hand foot syndrome  [de-identified] : hyperpitgmnted areas no erythema or drainage shins bilat

## 2020-12-21 NOTE — HISTORY OF PRESENT ILLNESS
[de-identified] : This is a 58 y/o woman wiiht a hx fo stage 4 colon cancer , Kras wt, MSI stable, rectal and sigmoid primary , found to have peritoneal disease at time  of initial staging. \par s/p folfirinox avastin originally then irinotecan dropped for toxicity . S/p FOLFOX avastin, x6 months now on xeloda alone, avastin has been held due to hypertension. \par Patient underwent resection for the 2 primaries as well as debulking in oct 2018, with dr way at [place]. Pathology revealed, complete response in rectum but residual moderately differentiated adenocarcinoma of sigmoid, omentum positive for adeno and right ovary positive for metasatic colon cancer . No perforation was note but pos for perineuural invasion. \par rectum ypT1No\par sigmoid ypT3No M1c \par \par patient has been YOLIS , colonoscopy may 2019 one polyp. \par last ct scan negative for mets, + fatty liver feb 2020 \par \par cea is 3.8 which is up in may \par ct scan june 2020 shows slight increase in adenopathy 8 mm to 1.1 cm \par \par PETCT - done in july 2020 - no uptake in nodes in retroperitoneum \par Foundation testing shows BRCA 2 mutation and also p53 mutation \par \par Inv[facility] - RODOLFO mutation x2(2 diff heterogenous mutations uncertain sig ) , MSH6 heterogenous (undetermined sig) \par \par \par sept 2020 \par vit d 54, ferritin 46 \par mammogram was done at [facility] in [place] on 10/02/20 and was negative\par ct scan in nov 2020 - stable RP nodes , new multiple nodules up to 4 mm in lungs bilaterally , compared to feb 2020 ( no chest done in june 2020)  [de-identified] : \par \par INTERVAL Hx \par had tooth extracted all went well no bleeding , had follow up , all is healing well (done in nov 2020) \par no sob or cough \par due for avastin today \par cont on mybetriq for urinary symptoms they are better \par nausea continues but better with seabands , better when off avastin \par rash is improved \par no abd pain or bowel changes \par \par

## 2020-12-21 NOTE — ASSESSMENT
[FreeTextEntry1] : This is a 60 y/o woman with a hx of metastatic colorectal cancer, s/p hemicolectomy and debulking procedure. Kras wildtype, MSI stable \par s/p folfox(neoadjuvant)  and in joseph of avastin due to hypertension. Now on maintnace xeloda with out evidence of disease\par foundation testing which found a BRCA mutation \par the invitae testiing does not indcate a brca germline, but did find 2 RODOLFO and one MSH6. (all are VUS) \par \par 1) colon cancer \par doing well , no new symptoms \par colonoscopy - polyp found may 2020 \par petct july 2020 negative \par avastin was added to xeloda for the slight progression (in rp nodes in june) , which improved cea from 10 to 3 \par ct scan  from nov 2020 at length - the RP nodes are stable but there are new lung nodules\par patient continues to be assympatomic \par avastin held due to dental procedure , now 4 weeks out healing well \par cont xeloda \par proceed with avastin \par repeat cea and cmp \par repeat scans in 2 cycles of avastin (6 weeks) \par \par \par 2) hand foot syndrome \par stable \par \par 3) vit d def \par cont vit d  \par \par 4) hypertension \par stable \par monitor  at home , will follow closely on Avastin \par \par 5) incontinence \par followed up with dr mccray \par con myrbetric \par \par 6) neuropathy \par stable \par \par 7) iron def \par hb stable \par ferritin is 80's , on oral iron , tolerating well  , cont to moniter \par \par 8) b12 def \par cont b12 oral \par \par 9) health maintenance \par mammogram is up to date,  pap up to date  \par \par 10) restless legs \par on iron \par cont mirapex \par improved\par \par 11) anticipatory nausea \par cont sea bands \par \par 12) rash \par folliculitis , s.p derm eval \par cont topical steroids and antibitoics \par \par d/w patient and spouse at length \par proceed with avastin \par follow up in 3 weeks

## 2021-01-11 ENCOUNTER — APPOINTMENT (OUTPATIENT)
Dept: HEMATOLOGY ONCOLOGY | Facility: CLINIC | Age: 61
End: 2021-01-11
Payer: COMMERCIAL

## 2021-01-11 VITALS
OXYGEN SATURATION: 98 % | RESPIRATION RATE: 18 BRPM | WEIGHT: 174 LBS | TEMPERATURE: 98.2 F | HEIGHT: 62 IN | DIASTOLIC BLOOD PRESSURE: 80 MMHG | HEART RATE: 83 BPM | BODY MASS INDEX: 32.02 KG/M2 | SYSTOLIC BLOOD PRESSURE: 145 MMHG

## 2021-01-11 PROCEDURE — 99214 OFFICE O/P EST MOD 30 MIN: CPT

## 2021-01-11 PROCEDURE — 99072 ADDL SUPL MATRL&STAF TM PHE: CPT

## 2021-01-11 NOTE — REASON FOR VISIT
[Follow-Up Visit] : a follow-up [FreeTextEntry2] : colon cancer here  for follow up  Have Your Skin Lesions Been Treated?: not been treated Is This A New Presentation, Or A Follow-Up?: Skin Lesions How Severe Is Your Skin Lesion?: mild

## 2021-01-11 NOTE — ASSESSMENT
[FreeTextEntry1] : This is a 60 y/o woman with a hx of metastatic colorectal cancer, s/p hemicolectomy and debulking procedure. Kras wildtype, MSI stable \par s/p folfox(neoadjuvant)  and in joseph of avastin due to hypertension. Now on maintnace xeloda with out evidence of disease\par foundation testing which found a BRCA mutation \par the invitae testiing does not indcate a brca germline, but did find 2 RODOLFO and one MSH6. (all are VUS) \par \par 1) colon cancer \par doing well overall\par colonoscopy - polyp found may 2020 \par petct july 2020 negative \par avastin was added to xeloda for the slight progression (in rp nodes in june) , which improved cea from 10 to 3 \par ct scan  from nov 2020 at length - the RP nodes are stable but there are new lung nodules\par patient continues to be assympatomic \par -Avastin was held due to dental procedure now back on Avastin\par -Continues to tolerate Xeloda Avastin very well\par -Patient is now having some cough with clear sputum but it seems to be associated with vaping however we need to obviously make sure that there is no progression of the lung disease\par -Advise repeat CT chest abdomen pelvis prior to next appointment\par -Repeat CEA today\par -Proceed with Avastin, continue Xeloda\par \par \par \par 2) hand foot syndrome \par stable \par \par 3) vit d def \par cont vit d  \par \par 4) hypertension \par Slight increase in blood pressure today however patient was nervous about her  not being present will repeat in the infusion center, if continues to increase will need follow-up with PCP\par \par 5) incontinence \par Advise follow-up with urology\par con myrbetric \par \par 6) neuropathy \par stable \par \par 7) iron def \par hb stable \par ferritin is 80's , on oral iron \par Repeat iron levels today\par \par 8) b12 def \par cont b12 oral, repeat B12 levels today\par \par 9) health maintenance \par mammogram is up to date,  pap up to date  \par \par 10) restless legs \par on iron \par cont mirapex \par improved\par \par 11) anticipatory nausea \par cont sea bands, helping significantly overall with nausea\par \par 12) rash \par folliculitis , s.p derm eval \par cont topical steroids and antibitoics \par \par d/w patient and spouse at length \par proceed with avastin \par follow up in 3 weeks

## 2021-01-11 NOTE — HISTORY OF PRESENT ILLNESS
[de-identified] : This is a 58 y/o woman wiiht a hx fo stage 4 colon cancer , Kras wt, MSI stable, rectal and sigmoid primary , found to have peritoneal disease at time  of initial staging. \par s/p folfirinox avastin originally then irinotecan dropped for toxicity . S/p FOLFOX avastin, x6 months now on xeloda alone, avastin has been held due to hypertension. \par Patient underwent resection for the 2 primaries as well as debulking in oct 2018, with dr way at [place]. Pathology revealed, complete response in rectum but residual moderately differentiated adenocarcinoma of sigmoid, omentum positive for adeno and right ovary positive for metasatic colon cancer . No perforation was note but pos for perineuural invasion. \par rectum ypT1No\par sigmoid ypT3No M1c \par \par patient has been YOLIS , colonoscopy may 2019 one polyp. \par last ct scan negative for mets, + fatty liver feb 2020 \par \par cea is 3.8 which is up in may \par ct scan june 2020 shows slight increase in adenopathy 8 mm to 1.1 cm \par \par PETCT - done in july 2020 - no uptake in nodes in retroperitoneum \par Foundation testing shows BRCA 2 mutation and also p53 mutation \par \par [facility] - RODOLFO mutation x2(2 diff heterogenous mutations uncertain sig ) , MSH6 heterogenous (undetermined sig) \par \par \par sept 2020 \par vit d 54, ferritin 46 \par mammogram was done at [facility] in [place] on 10/02/20 and was negative\par ct scan in nov 2020 - stable RP nodes , new multiple nodules up to 4 mm in lungs bilaterally , compared to feb 2020 ( no chest done in june 2020)  [de-identified] : \par seems to have of sputum ,clear  about a month , when laying down \par no cough no sob \par nausea better with seabands, taking more antinausea \par rash is better \par tooth is healed \par bowels ok \par myrbetriq  is helping a little , will tobias follow up after being on 3 weeks \par

## 2021-01-11 NOTE — REVIEW OF SYSTEMS
[Fatigue] : fatigue [Negative] : Allergic/Immunologic [FreeTextEntry2] : Mild fatigue [FreeTextEntry6] : Coughing up of clear sputum particularly in the morning, no dyspnea

## 2021-01-11 NOTE — PHYSICAL EXAM
[Restricted in physically strenuous activity but ambulatory and able to carry out work of a light or sedentary nature] : Status 1- Restricted in physically strenuous activity but ambulatory and able to carry out work of a light or sedentary nature, e.g., light house work, office work [Obese] : obese [Normal] : affect appropriate [de-identified] : colostomy with  hernia  [de-identified] : mild hand foot syndrome  [de-identified] : hyperpitgmnted areas no erythema or drainage shins bilat

## 2021-01-12 LAB
25(OH)D3 SERPL-MCNC: 48 NG/ML
ALBUMIN SERPL ELPH-MCNC: 4.5 G/DL
ALP BLD-CCNC: 66 U/L
ALT SERPL-CCNC: 29 U/L
ANION GAP SERPL CALC-SCNC: 15 MMOL/L
AST SERPL-CCNC: 41 U/L
BILIRUB SERPL-MCNC: 0.4 MG/DL
BUN SERPL-MCNC: 12 MG/DL
CALCIUM SERPL-MCNC: 9.3 MG/DL
CANCER AG15-3 SERPL-ACNC: 17.4 U/ML
CEA SERPL-MCNC: 5.6 NG/ML
CHLORIDE SERPL-SCNC: 96 MMOL/L
CO2 SERPL-SCNC: 23 MMOL/L
CREAT SERPL-MCNC: 1.02 MG/DL
FERRITIN SERPL-MCNC: 106 NG/ML
FOLATE SERPL-MCNC: 6.2 NG/ML
GLUCOSE SERPL-MCNC: 57 MG/DL
IRON SATN MFR SERPL: 36 %
IRON SERPL-MCNC: 126 UG/DL
POTASSIUM SERPL-SCNC: NORMAL
PROT SERPL-MCNC: 7.9 G/DL
SODIUM SERPL-SCNC: 134 MMOL/L
TIBC SERPL-MCNC: 354 UG/DL
UIBC SERPL-MCNC: 228 UG/DL
VIT B12 SERPL-MCNC: 1273 PG/ML

## 2021-01-28 RX ORDER — PANTOPRAZOLE 20 MG/1
20 TABLET, DELAYED RELEASE ORAL DAILY
Qty: 90 | Refills: 0 | Status: ACTIVE | COMMUNITY
Start: 2021-01-28 | End: 1900-01-01

## 2021-02-05 ENCOUNTER — APPOINTMENT (OUTPATIENT)
Dept: HEMATOLOGY ONCOLOGY | Facility: CLINIC | Age: 61
End: 2021-02-05
Payer: COMMERCIAL

## 2021-02-05 VITALS
OXYGEN SATURATION: 98 % | HEART RATE: 83 BPM | SYSTOLIC BLOOD PRESSURE: 136 MMHG | RESPIRATION RATE: 18 BRPM | BODY MASS INDEX: 32.57 KG/M2 | WEIGHT: 177 LBS | HEIGHT: 62 IN | TEMPERATURE: 99.1 F | DIASTOLIC BLOOD PRESSURE: 88 MMHG

## 2021-02-05 DIAGNOSIS — H66.90 OTITIS MEDIA, UNSPECIFIED, UNSPECIFIED EAR: ICD-10-CM

## 2021-02-05 PROCEDURE — 99072 ADDL SUPL MATRL&STAF TM PHE: CPT

## 2021-02-05 PROCEDURE — 99214 OFFICE O/P EST MOD 30 MIN: CPT

## 2021-02-05 NOTE — REASON FOR VISIT
[Follow-Up Visit] : a follow-up [FreeTextEntry2] : colon cancer here  for follow up no new complaints

## 2021-02-05 NOTE — ASSESSMENT
[FreeTextEntry1] : This is a 58 y/o woman with a hx of metastatic colorectal cancer, s/p hemicolectomy and debulking procedure. Kras wildtype, MSI stable \par s/p folfox(neoadjuvant)  and in joseph of avastin due to hypertension. Now on maintnace xeloda with out evidence of disease\par foundation testing which found a BRCA mutation \par the invitae testiing does not indcate a brca germline, but did find 2 RODOLFO and one MSH6. (all are VUS) \par \par 1) colon cancer \par doing well overall\par avastin was added to xeloda for the slight progression (in rp nodes in june) , which improved cea from 10 to 3 \par -Avastin was held due to dental procedure now back on Avastin\par -CEA is now climbing again, last value was 5.6, possible progression.  Repeat CEA today\par -Cough is improving, less consistent with metastases\par -Repeat CT chest abdomen pelvis\par -Continues to tolerate treatment well\par -Proceed with Avastin, continue Xeloda\par \par \par \par 2) hand foot syndrome \par stable \par \par 3) vit d def \par cont vit d  \par \par 4) hypertension \par Blood pressure has been intermittent, however  takes her blood pressure at home and it has been normal\par \par 5) incontinence \par Advise follow-up with urology\par con myrbetric \par \par 6) neuropathy \par stable \par \par 7) iron def \par hb stable \par Iron levels improved to a ferritin over 100\par Continue oral iron\par \par 8) b12 def \par cont b12 oral, repeat B12 levels today\par \par 9) health maintenance \par mammogram is up to date,  pap up to date  \par \par 10) restless legs \par on iron \par cont mirapex \par improved\par \par 11) anticipatory nausea \par cont sea bands, helping significantly overall with nausea\par Continue medical marijuana\par \par 12) rash \par folliculitis , s.p derm eval \par cont topical steroids and antibitoics \par \par 13)  ear infection\par Reviewed tympanic membrane today\par Start Augmentin for possible infection, as pain did not improve with topicals\par \par d/w patient and spouse at length \par proceed with avastin \par follow up in 3 weeks,  will call me 2 days after the scan to review by phone prior to next visit

## 2021-02-05 NOTE — HISTORY OF PRESENT ILLNESS
[de-identified] : This is a 58 y/o woman wiiht a hx fo stage 4 colon cancer , Kras wt, MSI stable, rectal and sigmoid primary , found to have peritoneal disease at time  of initial staging. \par s/p folfirinox avastin originally then irinotecan dropped for toxicity . S/p FOLFOX avastin, x6 months now on xeloda alone, avastin has been held due to hypertension. \par Patient underwent resection for the 2 primaries as well as debulking in oct 2018, with dr way at [place]. Pathology revealed, complete response in rectum but residual moderately differentiated adenocarcinoma of sigmoid, omentum positive for adeno and right ovary positive for metasatic colon cancer . No perforation was note but pos for perineuural invasion. \par rectum ypT1No\par sigmoid ypT3No M1c \par \par patient has been YOLIS , colonoscopy may 2019 one polyp. \par last ct scan negative for mets, + fatty liver feb 2020 \par \par cea is 3.8 which is up in may \par ct scan june 2020 shows slight increase in adenopathy 8 mm to 1.1 cm \par \par PETCT - done in july 2020 - no uptake in nodes in retroperitoneum \par Foundation testing shows BRCA 2 mutation and also p53 mutation \par \par [facility] - RODOLFO mutation x2(2 diff heterogenous mutations uncertain sig ) , MSH6 heterogenous (undetermined sig) \par \par \par sept 2020 \par vit d 54, ferritin 46 \par mammogram was done at [facility] in [place] on 10/02/20 and was negative\par ct scan in nov 2020 - stable RP nodes , new multiple nodules up to 4 mm in lungs bilaterally , compared to feb 2020 ( no chest done in june 2020)  [de-identified] : NTERVAL HX \par cough is about the same not worse  better overall, stopped vaping medical marijuana is now using tablets.\par no sob \par nausea not bad , slightly worse in am \par no rash \par urination a bit better \par feb, 9th is scan \par 2 weeks ago ear pain , on drops not really improved has a lot of pressure in her ear\par CEA up to 5.6

## 2021-02-05 NOTE — PHYSICAL EXAM
[Restricted in physically strenuous activity but ambulatory and able to carry out work of a light or sedentary nature] : Status 1- Restricted in physically strenuous activity but ambulatory and able to carry out work of a light or sedentary nature, e.g., light house work, office work [Obese] : obese [Normal] : normal appearance, no rash, nodules, vesicles, ulcers, erythema [de-identified] : Tympanic membrane is not erythematous or bulging, there is a fairly good light reflex there is some erythema surrounding the tissues in the inner ear, but very mild [de-identified] : colostomy with  hernia  [de-identified] : mild hand foot syndrome

## 2021-02-07 LAB — CEA SERPL-MCNC: 5.4 NG/ML

## 2021-03-02 ENCOUNTER — APPOINTMENT (OUTPATIENT)
Dept: HEMATOLOGY ONCOLOGY | Facility: CLINIC | Age: 61
End: 2021-03-02
Payer: COMMERCIAL

## 2021-03-02 VITALS
RESPIRATION RATE: 18 BRPM | TEMPERATURE: 98.5 F | HEART RATE: 89 BPM | SYSTOLIC BLOOD PRESSURE: 149 MMHG | OXYGEN SATURATION: 99 % | DIASTOLIC BLOOD PRESSURE: 94 MMHG | WEIGHT: 172 LBS | BODY MASS INDEX: 31.65 KG/M2 | HEIGHT: 62 IN

## 2021-03-02 PROCEDURE — 99214 OFFICE O/P EST MOD 30 MIN: CPT

## 2021-03-02 PROCEDURE — 99072 ADDL SUPL MATRL&STAF TM PHE: CPT

## 2021-03-02 NOTE — REVIEW OF SYSTEMS
[Fatigue] : fatigue [Negative] : Allergic/Immunologic [FreeTextEntry2] : Mild fatigue [FreeTextEntry6] : Coughing up of clear sputum, no dyspnea

## 2021-03-02 NOTE — ASSESSMENT
[FreeTextEntry1] : This is a 60 y/o woman with a hx of metastatic colorectal cancer, s/p hemicolectomy and debulking procedure. Kras wildtype, MSI stable \par s/p folfox(neoadjuvant)  and in joseph of avastin due to hypertension. Now on maintnace xeloda with out evidence of disease\par foundation testing which found a BRCA mutation \par the [facility]ae testiing does not indcate a brca germline, but did find 2 RODOLFO and one MSH6. (all are VUS) \par \par 1) colon cancer \par doing well overall\par avastin was added to xeloda for the slight progression (in rp nodes in june) , which improved cea from 10 to 3 \par -Avastin was held due to dental procedure now back on Avastin\par -CEA is now stable but still higher the last 2 months than it has been previously\par Repeat CEA today\par -CT chest abdomen pelvis in February 2021 reviewed with the patient and her  today.  Discussed that the retroperitoneal lymph nodes continue to be stable since the addition of Avastin to Xeloda however this is the second scan where the lung nodules appear to be worsening.  They are more numerous and also increased in size.  This is most consistent with progression of cancer in this area.  Explained that the only way to know for sure would be to surgically resect one of the nodules however this would involve a surgery, biopsy would probably not be successful given how small the nodules are.  We also discussed the possibility of continuing the current regimen to continue to monitor the lung nodules but then we can run the risk that the patient may become symptomatic.\par The best option is to add chemotherapy to her current regimen, options include oxaliplatin as well as irinotecan.  Patient tolerated oxaliplatin very well and has no significant residual neuropathy so we decided on oxaliplatin.  We discussed the potential side effects including nausea, constipation, hair loss, cytopenias risk of infection risk of allergic reaction neuropathy and death\par -Proceed with Xeloda and oxaliplatin (xelox) in combination with Avastin.\par -Full antiemetic support as needed given patient's history of nausea, and full growth factor support needed given her risk of neutropenia due to prior pretreatment.\par \par \par 2)hand foot syndrome \par stable \par \par 3) vit d def \par cont vit d  \par \par 4) hypertension \par Blood pressure has been intermittent, however  takes her blood pressure at home and it has been normal\par Pressure continues to creep up, I advised the patient and  to take blood pressure at home to monitor a more regular basis\par Explained that Avastin is well known to cause hypertension.\par \par 5) incontinence \par Advise follow-up with urology\par con myrbetric \par \par 6) neuropathy \par stable \par \par 7) iron def \par hb stable \par Iron levels improved to a ferritin over 100\par Continue oral iron\par \par 8) b12 def \par cont b12 oral, repeat B12 levels today\par \par 9) health maintenance \par mammogram is up to date,  pap up to date  \par \par 10) restless legs \par on iron \par cont mirapex \par improved\par \par 11) anticipatory nausea \par cont sea bands, helping significantly overall with nausea\par Continue medical marijuana\par \par 12) rash \par folliculitis , s.p derm eval \par cont topical steroids and antibitoics \par \par 13)  ear infection\par No improvement in pain despite antibiotics\par Most likely due to congestion, start Zyrtec and other decongestants\par \par \par d/w patient and spouse at length \par  emotional  support provided today\par Should call with any new symptoms\par Follow-up in 3 weeks or sooner as needed

## 2021-03-02 NOTE — REASON FOR VISIT
[Follow-Up Visit] : a follow-up [FreeTextEntry2] : colon cancer here  for follow up for scans results, complains of anxiety

## 2021-03-02 NOTE — HISTORY OF PRESENT ILLNESS
[de-identified] : This is a 60 y/o woman wiiht a hx fo stage 4 colon cancer , Kras wt, MSI stable, rectal and sigmoid primary , found to have peritoneal disease at time  of initial staging. \par s/p folfirinox avastin originally then irinotecan dropped for toxicity . S/p FOLFOX avastin, x6 months now on xeloda alone, avastin has been held due to hypertension. \par Patient underwent resection for the 2 primaries as well as debulking in oct 2018, with dr way at [place]. Pathology revealed, complete response in rectum but residual moderately differentiated adenocarcinoma of sigmoid, omentum positive for adeno and right ovary positive for metasatic colon cancer . No perforation was note but pos for perineuural invasion. \par rectum ypT1No\par sigmoid ypT3No M1c \par \par patient has been YOLIS , colonoscopy may 2019 one polyp. \par last ct scan negative for mets, + fatty liver feb 2020 \par \par cea is 3.8 which is up in may \par ct scan june 2020 shows slight increase in adenopathy 8 mm to 1.1 cm \par \par PETCT - done in july 2020 - no uptake in nodes in retroperitoneum \par Foundation testing shows BRCA 2 mutation and also p53 mutation \par \par [facility] - RODOLFO mutation x2(2 diff heterogenous mutations uncertain sig ) , MSH6 heterogenous (undetermined sig) \par \par \par sept 2020 \par vit d 54, ferritin 46 \par mammogram was done at [facility] in [place] on 10/02/20 and was negative\par ct scan in nov 2020 - stable RP nodes , new multiple nodules up to 4 mm in lungs bilaterally , compared to feb 2020 ( no chest done in june 2020)  [de-identified] : \par cough is about the same, no sob \par rash  better \par had antibiotics - ear is no better, diarrhea \par cea 5.4 which is slightly improved in feb 2021\par urination is about the same \par nausea is about the same\par CT scan of the chest abdomen and pelvis February 2021 revealing for progression of pulmonary metastases with slight increase in size and number of previously noted lung nodules, right lower lobe 6 mm up from 3 mm left upper lobe 5 mm up from 3 mm left lower lobe 6 mm up from 3 mm, the previously described retroperitoneal lymphadenopathy is stable at 1.1 cm there are no other areas of disease.

## 2021-03-02 NOTE — PHYSICAL EXAM
[Restricted in physically strenuous activity but ambulatory and able to carry out work of a light or sedentary nature] : Status 1- Restricted in physically strenuous activity but ambulatory and able to carry out work of a light or sedentary nature, e.g., light house work, office work [Obese] : obese [Normal] : affect appropriate [de-identified] : colostomy with  hernia  [de-identified] : mild hand foot syndrome

## 2021-03-07 LAB
CEA SERPL-MCNC: 6.5 NG/ML
FERRITIN SERPL-MCNC: 159 NG/ML
FOLATE SERPL-MCNC: 8.5 NG/ML
IRON SATN MFR SERPL: 32 %
IRON SERPL-MCNC: 126 UG/DL
TIBC SERPL-MCNC: 391 UG/DL
UIBC SERPL-MCNC: 265 UG/DL
VIT B12 SERPL-MCNC: 994 PG/ML

## 2021-03-24 ENCOUNTER — APPOINTMENT (OUTPATIENT)
Dept: HEMATOLOGY ONCOLOGY | Facility: CLINIC | Age: 61
End: 2021-03-24
Payer: COMMERCIAL

## 2021-03-24 VITALS
WEIGHT: 172.9 LBS | TEMPERATURE: 98.6 F | SYSTOLIC BLOOD PRESSURE: 151 MMHG | OXYGEN SATURATION: 99 % | BODY MASS INDEX: 31.82 KG/M2 | DIASTOLIC BLOOD PRESSURE: 87 MMHG | HEIGHT: 62 IN | HEART RATE: 92 BPM | RESPIRATION RATE: 16 BRPM

## 2021-03-24 PROCEDURE — 99072 ADDL SUPL MATRL&STAF TM PHE: CPT

## 2021-03-24 PROCEDURE — 99214 OFFICE O/P EST MOD 30 MIN: CPT

## 2021-03-24 NOTE — PHYSICAL EXAM
[Restricted in physically strenuous activity but ambulatory and able to carry out work of a light or sedentary nature] : Status 1- Restricted in physically strenuous activity but ambulatory and able to carry out work of a light or sedentary nature, e.g., light house work, office work [Obese] : obese [Normal] : affect appropriate [de-identified] : colostomy with  hernia  [de-identified] : mild hand foot syndrome

## 2021-03-24 NOTE — REVIEW OF SYSTEMS
[Fatigue] : fatigue [Negative] : Allergic/Immunologic [FreeTextEntry2] : Mild fatigue [FreeTextEntry6] : Cough is improved [FreeTextEntry7] : Nausea was worse this cycle but now is better [de-identified] : cold Induced neuropathy improved

## 2021-03-24 NOTE — ASSESSMENT
[FreeTextEntry1] : This is a 60 y/o woman with a hx of metastatic colorectal cancer, s/p hemicolectomy and debulking procedure. Kras wildtype, MSI stable \par s/p folfox(neoadjuvant)  and in joseph of avastin due to hypertension. Now on maintnace xeloda with out evidence of disease\par foundation testing which found a BRCA mutation \par the [facility]ae testiing does not indcate a brca germline, but did find 2 RODOLFO and one MSH6. (all are VUS) \par \par 1) colon cancer \par doing well overall\par -Patient with progression on Xeloda and Avastin, with worsening lung nodules on the scan in February 2021 now on oxaliplatin, Xeloda and Avastin\par -First cycle of oxaliplatin was last week, patient tolerated it well with the exception of cold-induced neuropathy and a little more fatigue and nausea\par The neuropathy is not sustained so we will not reduce the dose of the oxaliplatin at this point-\par -proceed with cycle 2 of Xeloda oxaliplatin and Avastin\par -Repeat CMP and CEA\par -Plan for scans after 6 doses.\par \par 2)hand foot syndrome \par stable \par \par 3) vit d def \par cont vit d  \par \par 4) hypertension \par Continue to monitor blood pressure at home\par \par 5) incontinence \par Advise follow-up with urology\par con myrbetric \par \par 6) neuropathy \par Was worse with the cycle but now improved\par \par 7) iron def \par hb stable \par Iron levels improved to a ferritin over 100\par Continue oral iron\par \par 8) b12 def \par cont b12 oral, repeat B12 levels today\par \par 9) health maintenance \par mammogram is up to date,  pap up to date  \par \par 10) restless legs \par on iron \par cont mirapex \par improved\par \par 11) anticipatory nausea \par cont sea bands, helping significantly overall with nausea\par Continue medical marijuana\par \par 12) rash \par folliculitis , s.p derm eval \par cont topical steroids and antibitoics \par \par \par Should call with any new symptoms\par Follow-up in 3 weeks or sooner as needed\par \par \par While patient was receiving oxaliplatin she began having itching of her hands and feet, the infusion was stopped and she was given Benadryl Solu-Medrol and Pepcid. \par Both patient and  were informed that she can no longer continue with the oxaliplatin as this was allergic reaction and rechallenge can lead to anaphylaxis.\par Therefore I am recommending changing to irinotecan in 3 weeks when she returns.

## 2021-03-24 NOTE — HISTORY OF PRESENT ILLNESS
[de-identified] : This is a 58 y/o woman wiiht a hx fo stage 4 colon cancer , Kras wt, MSI stable, rectal and sigmoid primary , found to have peritoneal disease at time  of initial staging. \par s/p folfirinox avastin originally then irinotecan dropped for toxicity . S/p FOLFOX avastin, x6 months now on xeloda alone, avastin has been held due to hypertension. \par Patient underwent resection for the 2 primaries as well as debulking in oct 2018, with dr way at [place]. Pathology revealed, complete response in rectum but residual moderately differentiated adenocarcinoma of sigmoid, omentum positive for adeno and right ovary positive for metasatic colon cancer . No perforation was note but pos for perineuural invasion. \par rectum ypT1No\par sigmoid ypT3No M1c \par \par patient has been YOLIS , colonoscopy may 2019 one polyp. \par last ct scan negative for mets, + fatty liver feb 2020 \par \par cea is 3.8 which is up in may \par ct scan june 2020 shows slight increase in adenopathy 8 mm to 1.1 cm \par \par PETCT - done in july 2020 - no uptake in nodes in retroperitoneum \par Foundation testing shows BRCA 2 mutation and also p53 mutation \par \par [facility] - RODOLFO mutation x2(2 diff heterogenous mutations uncertain sig ) , MSH6 heterogenous (undetermined sig) \par \par \par sept 2020 \par vit d 54, ferritin 46 \par mammogram was done at [facility] in [place] on 10/02/20 and was negative\par ct scan in nov 2020 - stable RP nodes , new multiple nodules up to 4 mm in lungs bilaterally , compared to feb 2020 ( no chest done in june 2020) \par \par CT scan of the chest abdomen and pelvis February 2021 revealing for progression of pulmonary metastases with slight increase in size and number of previously noted lung nodules, right lower lobe 6 mm up from 3 mm left upper lobe 5 mm up from 3 mm left lower lobe 6 mm up from 3 mm, the previously described retroperitoneal lymphadenopathy is stable at 1.1 cm there are no other areas of disease.\par  [de-identified] :  Started oxaliplatin 3 weeks ago first dose.\par neuropathy was bad that day hands and throat  But no neuropathy this week , ok hands and throat ok \par coughing almost gone \par nausea that day whole ride home and for 2 days \par need refillfor lorazepam\par  alittle diarrhea - loose  , but not bad \par more tired than usual , 1 week \par nose bleeding after covid test \par bp ok \par  urology - mybetriq \par

## 2021-03-24 NOTE — REASON FOR VISIT
[Follow-Up Visit] : a follow-up [FreeTextEntry2] : colon cancer here  for chemo , complains of more nausea and neuropathy

## 2021-03-28 LAB
CEA SERPL-MCNC: 5.1 NG/ML
FERRITIN SERPL-MCNC: 190 NG/ML
FOLATE SERPL-MCNC: 5.8 NG/ML
IRON SATN MFR SERPL: 21 %
IRON SERPL-MCNC: 76 UG/DL
TIBC SERPL-MCNC: 366 UG/DL
UIBC SERPL-MCNC: 290 UG/DL
VIT B12 SERPL-MCNC: >2000 PG/ML

## 2021-04-14 ENCOUNTER — APPOINTMENT (OUTPATIENT)
Dept: HEMATOLOGY ONCOLOGY | Facility: CLINIC | Age: 61
End: 2021-04-14
Payer: COMMERCIAL

## 2021-04-14 VITALS
HEART RATE: 103 BPM | OXYGEN SATURATION: 100 % | BODY MASS INDEX: 30.91 KG/M2 | SYSTOLIC BLOOD PRESSURE: 145 MMHG | TEMPERATURE: 98 F | WEIGHT: 169 LBS | DIASTOLIC BLOOD PRESSURE: 86 MMHG | RESPIRATION RATE: 18 BRPM

## 2021-04-14 PROCEDURE — 99072 ADDL SUPL MATRL&STAF TM PHE: CPT

## 2021-04-14 PROCEDURE — 99214 OFFICE O/P EST MOD 30 MIN: CPT

## 2021-04-14 NOTE — PHYSICAL EXAM
[Restricted in physically strenuous activity but ambulatory and able to carry out work of a light or sedentary nature] : Status 1- Restricted in physically strenuous activity but ambulatory and able to carry out work of a light or sedentary nature, e.g., light house work, office work [Obese] : obese [Normal] : affect appropriate [de-identified] : colostomy with  hernia  [de-identified] : mild hand foot syndrome

## 2021-04-14 NOTE — REVIEW OF SYSTEMS
[Fatigue] : fatigue [Negative] : Allergic/Immunologic [FreeTextEntry2] : Mild fatigue [FreeTextEntry6] : Cough is improved [FreeTextEntry7] : nausea continues not worse  [de-identified] : neuropathy better

## 2021-04-14 NOTE — ASSESSMENT
[FreeTextEntry1] : This is a 60 y/o woman with a hx of metastatic colorectal cancer, s/p hemicolectomy and debulking procedure. Kras wildtype, MSI stable \par s/p folfox(neoadjuvant)  and in joseph of avastin due to hypertension. Now on maintnace xeloda with out evidence of disease\par foundation testing which found a BRCA mutation \par the [facility]ae testiing does not indcate a brca germline, but did find 2 RODOLFO and one MSH6. (all are VUS) \par \par 1) colon cancer \par doing well overall\par -Patient with progression on Xeloda and Avastin, with worsening lung nodules on the scan in February 2021 now on oxaliplatin, Xeloda and Avastin\par -Cycle 2 of oxaliplatin was stopped after less than half of the infusion due to allergic reaction( last visit 3/24/21) \par Explained to the patient and her  today that she cannot be rechallenged with-oxaliplatin as this may result in anaphylaxis\par -Therefore we will have to change her chemotherapy, I have recommended irinotecan to take in combination with the Xeloda and Avastin.\par -Reviewed the risks of irinotecan at length including the risk of diarrhea, nausea vomiting, fatigue, cytopenias rarley serious infectin or death \par - prescribed lomotil prn \par -Proceed with cycle 1 of irinotecan and Avastin in combination with Xeloda\par -Repeat CMP and markers\par \par 2)hand foot syndrome \par stable \par \par 3) vit d def \par cont vit d  \par \par 4) hypertension \par stable \par Continue to monitor blood pressure at home\par \par 5) incontinence \par Advise follow-up with urology\par con myrbetric \par \par 6) neuropathy \par Was worse with the cycle but now improved\par \par 7) iron def \par hb stable \par Iron levels improved to a ferritin over 100\par Continue oral iron\par \par 8) b12 def \par cont b12 oral, repeat B12 levels today\par \par 9) health maintenance \par mammogram is up to date,  pap up to date  \par \par 10) restless legs \par on iron \par cont mirapex \par improved\par \par 11)  nausea \par cont sea bands, helping significantly overall with nausea\par Continue medical marijuana\par Patient does have Zofran and Compazine to take in case the chemotherapy leads to worsening nausea\par \par 12) rash \par folliculitis , s.p derm eval \par cont topical steroids and antibitoics \par \par Discussed with patient and  at length\par Should call with any new symptoms\par Follow-up in 3 weeks or sooner as needed\par

## 2021-04-14 NOTE — HISTORY OF PRESENT ILLNESS
[de-identified] : This is a 60 y/o woman wiiht a hx fo stage 4 colon cancer , Kras wt, MSI stable, rectal and sigmoid primary , found to have peritoneal disease at time  of initial staging. \par s/p folfirinox avastin originally then irinotecan dropped for toxicity . S/p FOLFOX avastin, x6 months now on xeloda alone, avastin has been held due to hypertension. \par Patient underwent resection for the 2 primaries as well as debulking in oct 2018, with dr way at [place]. Pathology revealed, complete response in rectum but residual moderately differentiated adenocarcinoma of sigmoid, omentum positive for adeno and right ovary positive for metasatic colon cancer . No perforation was note but pos for perineuural invasion. \par rectum ypT1No\par sigmoid ypT3No M1c \par \par patient has been YOLIS , colonoscopy may 2019 one polyp. \par last ct scan negative for mets, + fatty liver feb 2020 \par \par cea is 3.8 which is up in may \par ct scan june 2020 shows slight increase in adenopathy 8 mm to 1.1 cm \par \par PETCT - done in july 2020 - no uptake in nodes in retroperitoneum \par Foundation testing shows BRCA 2 mutation and also p53 mutation \par \par [facility] - RODOLFO mutation x2(2 diff heterogenous mutations uncertain sig ) , MSH6 heterogenous (undetermined sig) \par \par \par sept 2020 \par vit d 54, ferritin 46 \par mammogram was done at [facility] in [place] on 10/02/20 and was negative\par ct scan in nov 2020 - stable RP nodes , new multiple nodules up to 4 mm in lungs bilaterally , compared to feb 2020 ( no chest done in june 2020) \par \par CT scan of the chest abdomen and pelvis February 2021 revealing for progression of pulmonary metastases with slight increase in size and number of previously noted lung nodules, right lower lobe 6 mm up from 3 mm left upper lobe 5 mm up from 3 mm left lower lobe 6 mm up from 3 mm, the previously described retroperitoneal lymphadenopathy is stable at 1.1 cm there are no other areas of disease.\par  [de-identified] : 3 weeks ago when the patient received oxaliplatin she had a episode of persistent itching extensive therefore the infusion had to stop early \par  palms and soles were very itchy  and then progressed to a rash despite additional medications for reaction\par At home after last treatment , rash resolved , no itching \par no  sob or cough \par bp has been fine , no bleeding \par covid test done \par better numbness this cycle \par still nauseated \par

## 2021-04-14 NOTE — REASON FOR VISIT
[Follow-Up Visit] : a follow-up [FreeTextEntry2] : colon cancer here  for chemo , no new complaints, had reaction to oxaliplatin last infusion

## 2021-04-15 LAB — FERRITIN SERPL-MCNC: 191 NG/ML

## 2021-05-05 ENCOUNTER — APPOINTMENT (OUTPATIENT)
Dept: HEMATOLOGY ONCOLOGY | Facility: CLINIC | Age: 61
End: 2021-05-05
Payer: COMMERCIAL

## 2021-05-05 VITALS
TEMPERATURE: 98.7 F | WEIGHT: 161 LBS | RESPIRATION RATE: 18 BRPM | OXYGEN SATURATION: 97 % | HEIGHT: 62 IN | DIASTOLIC BLOOD PRESSURE: 89 MMHG | SYSTOLIC BLOOD PRESSURE: 138 MMHG | BODY MASS INDEX: 29.63 KG/M2 | HEART RATE: 92 BPM

## 2021-05-05 PROCEDURE — 99072 ADDL SUPL MATRL&STAF TM PHE: CPT

## 2021-05-05 PROCEDURE — 99214 OFFICE O/P EST MOD 30 MIN: CPT

## 2021-05-05 NOTE — PHYSICAL EXAM
[Restricted in physically strenuous activity but ambulatory and able to carry out work of a light or sedentary nature] : Status 1- Restricted in physically strenuous activity but ambulatory and able to carry out work of a light or sedentary nature, e.g., light house work, office work [Obese] : obese [Normal] : affect appropriate [de-identified] : colostomy with  hernia  [de-identified] : mild hand foot syndrome

## 2021-05-05 NOTE — HISTORY OF PRESENT ILLNESS
[de-identified] : This is a 60 y/o woman wiiht a hx fo stage 4 colon cancer , Kras wt, MSI stable, rectal and sigmoid primary , found to have peritoneal disease at time  of initial staging. \par s/p folfirinox avastin originally then irinotecan dropped for toxicity . S/p FOLFOX avastin, x6 months now on xeloda alone, avastin has been held due to hypertension. \par Patient underwent resection for the 2 primaries as well as debulking in oct 2018, with dr way at [place]. Pathology revealed, complete response in rectum but residual moderately differentiated adenocarcinoma of sigmoid, omentum positive for adeno and right ovary positive for metasatic colon cancer . No perforation was note but pos for perineuural invasion. \par rectum ypT1No\par sigmoid ypT3No M1c \par \par patient has been YOLIS , colonoscopy may 2019 one polyp. \par last ct scan negative for mets, + fatty liver feb 2020 \par \par cea is 3.8 which is up in may \par ct scan june 2020 shows slight increase in adenopathy 8 mm to 1.1 cm \par \par PETCT - done in july 2020 - no uptake in nodes in retroperitoneum \par Foundation testing shows BRCA 2 mutation and also p53 mutation \par \par [facility] - RODOLFO mutation x2(2 diff heterogenous mutations uncertain sig ) , MSH6 heterogenous (undetermined sig) \par \par \par sept 2020 \par vit d 54, ferritin 46 \par mammogram was done at [facility] in [place] on 10/02/20 and was negative\par ct scan in nov 2020 - stable RP nodes , new multiple nodules up to 4 mm in lungs bilaterally , compared to feb 2020 ( no chest done in june 2020) \par \par CT scan of the chest abdomen and pelvis February 2021 revealing for progression of pulmonary metastases with slight increase in size and number of previously noted lung nodules, right lower lobe 6 mm up from 3 mm left upper lobe 5 mm up from 3 mm left lower lobe 6 mm up from 3 mm, the previously described retroperitoneal lymphadenopathy is stable at 1.1 cm there are no other areas of disease.\par  [de-identified] : pacing a lot , pychiatrist thinks more anxious , not sleeping well , taking ambien but had bad reaction \par took benadryl helped \par a little more diarrhea then usual , better last week more normal , imodium 2-3 per day \par more restless legs \par more tired than usual \par bp is ok \par improved neuropathy\par nausea is better  t with  sea bands \par will need more dental work eventually \par still taking metformin \par needs colonoscooy but not \par

## 2021-05-05 NOTE — REVIEW OF SYSTEMS
[Fatigue] : fatigue [Negative] : Allergic/Immunologic [Diarrhea] : diarrhea [Anxiety] : anxiety [FreeTextEntry2] : Mild fatigue [FreeTextEntry6] : Cough is improved [de-identified] : neuropathy better

## 2021-05-05 NOTE — ASSESSMENT
[FreeTextEntry1] : This is a 58 y/o woman with a hx of metastatic colorectal cancer, s/p hemicolectomy and debulking procedure. Kras wildtype, MSI stable \par s/p folfox(neoadjuvant)  and in joseph of avastin due to hypertension. Now on maintnace xeloda with out evidence of disease\par foundation testing which found a BRCA mutation \par the invitae testiing does not indcate a brca germline, but did find 2 RODOLFO and one MSH6. (all are VUS) \par \par 1) colon cancer \par doing well overall\par -Patient with progression on Xeloda and Avastin, with worsening lung nodules on the scan in February 2021 now on oxaliplatin, Xeloda and Avastin\par -Cycle 2 of oxaliplatin was stopped after less than half of the infusion due to allergic reaction( last visit 3/24/21) \par Explained to the patient and her  today that she cannot be rechallenged with-oxaliplatin as this may result in anaphylaxis\par -now on irinotecan every 3 weeks \par -overall tolerating well , discused increased diarrhea , advised cont imodium , dc xeloda \par -discussed anxiety cont lorazepam cont follow up psych \par -Proceed with cycle 2  of irinotecan and Avastin , dc xeloda \par -Repeat CMP and markers\par \par 2)hand foot syndrome \par stable \par \par 3) vit d def \par cont vit d  \par \par 4) hypertension \par stable \par Continue to monitor blood pressure at home\par \par 5) incontinence \par  follow-up with urology\par con myrbetric \par \par 6) neuropathy \par Was worse with the cycle but now improved\par \par 7) iron def \par hb stable \par Iron levels improved to a ferritin over 100\par Continue oral iron\par \par 8) b12 def \par cont b12 oral, repeat B12 levels today\par \par 9) health maintenance \par mammogram is up to date,  pap up to date  \par \par 10) restless legs \par on iron , imporved ferritin \par cont mirapex \par improved\par \par 11)  nausea \par cont sea bands, helping significantly overall with nausea\par Continue medical marijuana\par \par \par 12) rash \par folliculitis , s.p derm eval \par cont topical steroids and antibitoics \par \par 13) anxiety \par follow up psych \par cont current meds and lorazepam prn \par \par 14) weight loss \par advised incrased nutrition and calories \par cont medical marijuana will dw dispensary\par dc xeloda \par decrease metformin \par no nutritionist at present here \par \par Discussed with patient and  at length\par Should call with any new symptoms\par Follow-up in 3 weeks or sooner as needed\par

## 2021-05-05 NOTE — REASON FOR VISIT
[Follow-Up Visit] : a follow-up [FreeTextEntry2] : colon cancer here  for chemo , complain of anxiety and diarrhea

## 2021-05-09 LAB
CEA SERPL-MCNC: 7.6 NG/ML
FERRITIN SERPL-MCNC: 153 NG/ML
FOLATE SERPL-MCNC: 6.2 NG/ML
IRON SATN MFR SERPL: 24 %
IRON SERPL-MCNC: 80 UG/DL
TIBC SERPL-MCNC: 334 UG/DL
UIBC SERPL-MCNC: 254 UG/DL
VIT B12 SERPL-MCNC: 1084 PG/ML

## 2021-05-26 ENCOUNTER — APPOINTMENT (OUTPATIENT)
Dept: HEMATOLOGY ONCOLOGY | Facility: CLINIC | Age: 61
End: 2021-05-26
Payer: COMMERCIAL

## 2021-05-26 VITALS
TEMPERATURE: 98.3 F | HEART RATE: 103 BPM | SYSTOLIC BLOOD PRESSURE: 130 MMHG | RESPIRATION RATE: 18 BRPM | BODY MASS INDEX: 29.81 KG/M2 | WEIGHT: 162 LBS | DIASTOLIC BLOOD PRESSURE: 80 MMHG | OXYGEN SATURATION: 100 % | HEIGHT: 62 IN

## 2021-05-26 PROCEDURE — 99214 OFFICE O/P EST MOD 30 MIN: CPT

## 2021-05-26 PROCEDURE — 99072 ADDL SUPL MATRL&STAF TM PHE: CPT

## 2021-05-26 NOTE — PHYSICAL EXAM
[Restricted in physically strenuous activity but ambulatory and able to carry out work of a light or sedentary nature] : Status 1- Restricted in physically strenuous activity but ambulatory and able to carry out work of a light or sedentary nature, e.g., light house work, office work [Obese] : obese [Normal] : affect appropriate [de-identified] : colostomy with  hernia  [de-identified] : mild hand foot syndrome

## 2021-05-26 NOTE — ASSESSMENT
[FreeTextEntry1] : This is a 58 y/o woman with a hx of metastatic colorectal cancer, s/p hemicolectomy and debulking procedure. Kras wildtype, MSI stable \par s/p folfox(neoadjuvant)  and in joseph of avastin due to hypertension. Now on maintnace xeloda with out evidence of disease\par foundation testing which found a BRCA mutation \par the invitae testiing does not indcate a brca germline, but did find 2 RODOLFO and one MSH6. (all are VUS) \par \par 1) colon cancer \par doing well overall\par -Patient with progression on Xeloda and Avastin, with worsening lung nodules on the scan in February 2021 now on oxaliplatin, Xeloda and Avastin\par -Cycle 2 of oxaliplatin was stopped after less than half of the infusion due to allergic reaction( 3/24/21) \par -now on irinotecan every 3 weeks \par -diarrhea better off xeloda and metformin now also off \par -discussed anxiety cont lorazepam cont follow up psych \par -cea was slightly increased , repeat today \par -repeat cmp \par -repeat scans before next cycle \par -Proceed with cycle 3    of irinotecan and Avastin, decrease dose of irinotecan this cycle for fatigue \par \par \par 2)hand foot syndrome \par stable \par \par 3) vit d def \par cont vit d  \par \par 4) hypertension \par stable \par Continue to monitor blood pressure at home\par \par 5) incontinence \par  follow-up with urology, will need cystoscopy \par con myrbetric \par \par 6) neuropathy \par imrpoved now \par \par 7) iron def \par hb stable \par Iron levels improved to a ferritin over 100\par Continue oral iron\par \par 8) b12 def \par cont b12 oral, repeat B12 levels today\par \par 9) health maintenance \par mammogram is up to date,  pap up to date  \par \par 10) restless legs \par on iron , imporved ferritin \par cont mirapex \par improved\par \par 11)  nausea \par cont sea bands, helping significantly overall with nausea\par Continue medical marijuana\par overall stable /improved \par \par \par \par 12) anxiety \par stable but worse \par follow up psych \par cont current meds and lorazepam prn \par \par 13 weight loss \par advised incrased nutrition and calories \par cont medical marijuana will dw dispensary\par now off xeloda and metformin \par decrease dose of irinotcan today \par \par Discussed with patient and  at length\par Follow-up in 3 weeks with scans  or sooner as needed\par

## 2021-05-26 NOTE — HISTORY OF PRESENT ILLNESS
[de-identified] : This is a 58 y/o woman wiiht a hx fo stage 4 colon cancer , Kras wt, MSI stable, rectal and sigmoid primary , found to have peritoneal disease at time  of initial staging. \par s/p folfirinox avastin originally then irinotecan dropped for toxicity . S/p FOLFOX avastin, x6 months now on xeloda alone, avastin has been held due to hypertension. \par Patient underwent resection for the 2 primaries as well as debulking in oct 2018, with dr way at [place]. Pathology revealed, complete response in rectum but residual moderately differentiated adenocarcinoma of sigmoid, omentum positive for adeno and right ovary positive for metasatic colon cancer . No perforation was note but pos for perineuural invasion. \par rectum ypT1No\par sigmoid ypT3No M1c \par \par patient has been YOLIS , colonoscopy may 2019 one polyp. \par last ct scan negative for mets, + fatty liver feb 2020 \par \par cea is 3.8 which is up in may \par ct scan june 2020 shows slight increase in adenopathy 8 mm to 1.1 cm \par \par PETCT - done in july 2020 - no uptake in nodes in retroperitoneum \par Foundation testing shows BRCA 2 mutation and also p53 mutation \par \par Inv[facility] - RODOLFO mutation x2(2 diff heterogenous mutations uncertain sig ) , MSH6 heterogenous (undetermined sig) \par \par \par sept 2020 \par vit d 54, ferritin 46 \par mammogram was done at [facility] in [place] on 10/02/20 and was negative\par ct scan in nov 2020 - stable RP nodes , new multiple nodules up to 4 mm in lungs bilaterally , compared to feb 2020 ( no chest done in june 2020) \par \par CT scan of the chest abdomen and pelvis February 2021 revealing for progression of pulmonary metastases with slight increase in size and number of previously noted lung nodules, right lower lobe 6 mm up from 3 mm left upper lobe 5 mm up from 3 mm left lower lobe 6 mm up from 3 mm, the previously described retroperitoneal lymphadenopathy is stable at 1.1 cm there are no other areas of disease.\par  [de-identified] :  \par lost 5-6 pounds  after last chemo , very poor appetite , less nausea , less diarrhea ( frequency better) \par eating Armenian bread and salami , cheese and gained a few pound this week , weight is stable today\par loosing hair \par stopped metformin \par [facility] , did sonogram - urodynamics , cystoscopy planned for the end of June\par neuropathy and hand foot better \par may 8th labs at [facility] - fasting labs \par sleeping better \par psych still monitering symptoms , still anxious \par no cough (better) no sob \par Also has to have colonoscopy and more dental work

## 2021-05-26 NOTE — REVIEW OF SYSTEMS
[Fatigue] : fatigue [Diarrhea] : diarrhea [Anxiety] : anxiety [Negative] : Allergic/Immunologic [Shortness Of Breath] : no shortness of breath [FreeTextEntry2] : Mild fatigue [FreeTextEntry6] : Cough is improved [FreeTextEntry7] : poor appetite  [de-identified] : neuropathy better  [de-identified] : improved sleep

## 2021-06-16 ENCOUNTER — APPOINTMENT (OUTPATIENT)
Dept: HEMATOLOGY ONCOLOGY | Facility: CLINIC | Age: 61
End: 2021-06-16
Payer: COMMERCIAL

## 2021-06-16 VITALS
HEART RATE: 74 BPM | OXYGEN SATURATION: 100 % | BODY MASS INDEX: 29.01 KG/M2 | DIASTOLIC BLOOD PRESSURE: 84 MMHG | SYSTOLIC BLOOD PRESSURE: 142 MMHG | RESPIRATION RATE: 16 BRPM | WEIGHT: 158.6 LBS | TEMPERATURE: 98.3 F

## 2021-06-16 PROCEDURE — 99214 OFFICE O/P EST MOD 30 MIN: CPT

## 2021-06-16 PROCEDURE — 99072 ADDL SUPL MATRL&STAF TM PHE: CPT

## 2021-06-16 RX ORDER — GABAPENTIN 300 MG/1
300 CAPSULE ORAL 3 TIMES DAILY
Qty: 270 | Refills: 3 | Status: ACTIVE | COMMUNITY
Start: 2021-06-16 | End: 1900-01-01

## 2021-06-16 NOTE — ASSESSMENT
[FreeTextEntry1] : This is a 60 y/o woman with a hx of metastatic colorectal cancer, s/p hemicolectomy and debulking procedure. Kras wildtype, MSI stable \par s/p folfox(neoadjuvant)  and in joseph of avastin due to hypertension. Now on maintnace xeloda with out evidence of disease\par foundation testing which found a BRCA mutation \par the invitae testiing does not indcate a brca germline, but did find 2 RODOLFO and one MSH6. (all are VUS) \par \par 1) colon cancer \par doing well overall\par -Patient with progression on Xeloda and Avastin, with worsening lung nodules on the scan in February 2021 now on oxaliplatin, Xeloda and Avastin\par -Cycle 2 of oxaliplatin was stopped after less than half of the infusion due to allergic reaction( 3/24/21) \par -now on irinotecan every 3 weeks \par -CEA is significantly improving\par -CT scan reviewed shows some slight progression in lung nodules but otherwise stable\par -Discussed the CT findings at length with the patient and her  today reviewed the options for continuing current regimen versus changing to either Lonsurf or Strivarga \par -Given the CEA is improving and patient is asymptomatic, decided to go forward with irinotecan and Avastin for 2 more cycles and then rescan, this will be in approximately 6 weeks.\par -Proceed with cycle 4     of irinotecan and Avastin, (lower dose) \par \par \par 2)hand foot syndrome \par stable \par \par 3) vit d def \par cont vit d  \par \par 4) hypertension \par stable \par Continue to monitor blood pressure at home\par \par 5) incontinence \par  follow-up with urology, will need cystoscopy eventually \par con myrbetric \par \par 6) neuropathy \par imrpoved now \par \par 7) iron def \par hb stable \par Iron levels improved to a ferritin over 100\par Continue oral iron\par \par 8) b12 def \par cont b12 oral, repeat B12 levels today\par \par 9) health maintenance \par mammogram is up to date,  pap up to date  \par \par 10) restless legs \par on iron , imporved ferritin \par cont mirapex \par \par \par 11)  nausea \par cont sea bands, helping significantly overall with nausea\par Continue medical marijuana\par overall stable /improved \par \par \par \par 12) anxiety \par stable but worse \par follow up psych \par cont current meds and lorazepam prn \par \par 13 weight loss \par advised incrased nutrition and calories \par cont medical marijuana will dw dispensary\par now off xeloda and metformin \par improved \par \par 14) erica \par Patient continues to have very restless pacing which appears to be erica\par Her symptoms are improved with the gabapentin but still continue to be very severe in fact in the office today she was unable to sit still and had to walk around, irregardless the patient does not appear to be psychotic she has no homicidal or suicidal ideations and no hallucinations\par Apparently her psychiatrist who is followed her for many years has kept her on the Seroquel and Lexapro however does not want to change her medications.  For this reason I have suggested that she see our psychiatrist here in the [facility] to have a discussion regarding adjustment of her medications\par Continue gabapentin and  slowly increase dosing as tolerated until she has a psych evaluation\par \par Discussed with patient and  at length\par Follow-up in 3 weeks with scans  or sooner as needed\par

## 2021-06-16 NOTE — REASON FOR VISIT
negative [Follow-Up Visit] : a follow-up [FreeTextEntry2] : colon cancer here  for chemo , complain of anxiety and diarrhea , here for scans

## 2021-06-16 NOTE — PHYSICAL EXAM
[Restricted in physically strenuous activity but ambulatory and able to carry out work of a light or sedentary nature] : Status 1- Restricted in physically strenuous activity but ambulatory and able to carry out work of a light or sedentary nature, e.g., light house work, office work [Obese] : obese [Normal] : grossly intact [de-identified] : colostomy with  hernia  [de-identified] : mild hand foot syndrome  [de-identified] : very restless , pacing in room but answers questions appropriately

## 2021-06-16 NOTE — REVIEW OF SYSTEMS
[Fatigue] : fatigue [Diarrhea] : diarrhea [Anxiety] : anxiety [Negative] : Allergic/Immunologic [Shortness Of Breath] : no shortness of breath [Suicidal] : not suicidal [Insomnia] : insomnia [Depression] : no depression [FreeTextEntry2] : Mild fatigue [FreeTextEntry6] : Cough is resolved  [FreeTextEntry7] : poor appetite  [de-identified] : neuropathy better  [de-identified] : severe pacing and restlessness

## 2021-06-16 NOTE — HISTORY OF PRESENT ILLNESS
[de-identified] : This is a 58 y/o woman wiiht a hx fo stage 4 colon cancer , Kras wt, MSI stable, rectal and sigmoid primary , found to have peritoneal disease at time  of initial staging. \par s/p folfirinox avastin originally then irinotecan dropped for toxicity . S/p FOLFOX avastin, x6 months now on xeloda alone, avastin has been held due to hypertension. \par Patient underwent resection for the 2 primaries as well as debulking in oct 2018, with dr way at [place]. Pathology revealed, complete response in rectum but residual moderately differentiated adenocarcinoma of sigmoid, omentum positive for adeno and right ovary positive for metasatic colon cancer . No perforation was note but pos for perineuural invasion. \par rectum ypT1No\par sigmoid ypT3No M1c \par \par patient has been YOLIS , colonoscopy may 2019 one polyp. \par last ct scan negative for mets, + fatty liver feb 2020 \par \par cea is 3.8 which is up in may \par ct scan june 2020 shows slight increase in adenopathy 8 mm to 1.1 cm \par \par PETCT - done in july 2020 - no uptake in nodes in retroperitoneum \par Foundation testing shows BRCA 2 mutation and also p53 mutation \par \par Inv[facility] - RODOLFO mutation x2(2 diff heterogenous mutations uncertain sig ) , MSH6 heterogenous (undetermined sig) \par \par \par sept 2020 \par vit d 54, ferritin 46 \par mammogram was done at [facility] in [place] on 10/02/20 and was negative\par ct scan in nov 2020 - stable RP nodes , new multiple nodules up to 4 mm in lungs bilaterally , compared to feb 2020 ( no chest done in june 2020) \par \par CT scan of the chest abdomen and pelvis February 2021 revealing for progression of pulmonary metastases with slight increase in size and number of previously noted lung nodules, right lower lobe 6 mm up from 3 mm left upper lobe 5 mm up from 3 mm left lower lobe 6 mm up from 3 mm, the previously described retroperitoneal lymphadenopathy is stable at 1.1 cm there are no other areas of disease.\par  [de-identified] : pacing is worse , cant sit still having trouble sleeping \par I started patient on gabapentin (300 mg tid ) for pacing and anxiety , very restless , helping a little bit \par no hallucination , no depression \par has a psych but he  does not want to adjust meds \par continues pacing a lot \par cea went from 7.6(5/5)  down to 5.6 (5/26)                                    \par ct of c/a/p - slight increase of lung nodule- 9mm from 6mm rll, 7 mm from 5mm , 8 mm from 6mm , RP node sstable \par no other areas of disease \par

## 2021-06-20 LAB
ALBUMIN SERPL ELPH-MCNC: 4.5 G/DL
ALP BLD-CCNC: 88 U/L
ALT SERPL-CCNC: 25 U/L
ANION GAP SERPL CALC-SCNC: 15 MMOL/L
AST SERPL-CCNC: 30 U/L
BILIRUB SERPL-MCNC: 0.3 MG/DL
BUN SERPL-MCNC: 16 MG/DL
CALCIUM SERPL-MCNC: 9.7 MG/DL
CEA SERPL-MCNC: 5.9 NG/ML
CHLORIDE SERPL-SCNC: 95 MMOL/L
CO2 SERPL-SCNC: 25 MMOL/L
CREAT SERPL-MCNC: 0.77 MG/DL
FERRITIN SERPL-MCNC: 194 NG/ML
FOLATE SERPL-MCNC: 6.1 NG/ML
GLUCOSE SERPL-MCNC: 61 MG/DL
IRON SATN MFR SERPL: 29 %
IRON SERPL-MCNC: 104 UG/DL
POTASSIUM SERPL-SCNC: 5.7 MMOL/L
PROT SERPL-MCNC: 7.4 G/DL
SODIUM SERPL-SCNC: 136 MMOL/L
TIBC SERPL-MCNC: 362 UG/DL
TSH SERPL-ACNC: 3.07 UIU/ML
UIBC SERPL-MCNC: 258 UG/DL
VIT B12 SERPL-MCNC: >2000 PG/ML

## 2021-07-07 ENCOUNTER — APPOINTMENT (OUTPATIENT)
Dept: HEMATOLOGY ONCOLOGY | Facility: CLINIC | Age: 61
End: 2021-07-07
Payer: COMMERCIAL

## 2021-07-07 VITALS
OXYGEN SATURATION: 98 % | RESPIRATION RATE: 18 BRPM | DIASTOLIC BLOOD PRESSURE: 83 MMHG | HEART RATE: 90 BPM | HEIGHT: 62 IN | TEMPERATURE: 98.4 F | SYSTOLIC BLOOD PRESSURE: 129 MMHG | BODY MASS INDEX: 29.81 KG/M2 | WEIGHT: 162 LBS

## 2021-07-07 PROCEDURE — 99214 OFFICE O/P EST MOD 30 MIN: CPT

## 2021-07-07 PROCEDURE — 99072 ADDL SUPL MATRL&STAF TM PHE: CPT

## 2021-07-07 NOTE — ASSESSMENT
[FreeTextEntry1] : This is a 60 y/o woman with a hx of metastatic colorectal cancer, s/p hemicolectomy and debulking procedure. Kras wildtype, MSI stable \par s/p folfox(neoadjuvant)  and in joseph of avastin due to hypertension. Now on maintnace xeloda with out evidence of disease\par foundation testing which found a BRCA mutation \par the invitae testiing does not indcate a brca germline, but did find 2 RODOLFO and one MSH6. (all are VUS) \par \par 1) colon cancer \par doing well overall\par -Patient with progression on Xeloda and Avastin, with worsening lung nodules on the scan in February 2021 now on oxaliplatin, Xeloda and Avastin\par -Cycle 2 of oxaliplatin was stopped after less than half of the infusion due to allergic reaction( 3/24/21) \par -now on irinotecan every 3 weeks \par -CT scan reviewed shows some slight progression in lung nodules but otherwise stable\par -CEA improving, repeat CEA today\par -Repeat scans after today's treatment and 1 more cycle to make sure that there is no progression in the lungs.\par -repeat ua to look for protien \par -Proceed with cycle 5    of irinotecan and Avastin, (dose redcued) \par \par \par 2)hand foot syndrome \par stable \par \par 3) vit d def \par cont vit d  \par \par 4) hypertension \par stable \par Continue to monitor blood pressure at home\par \par 5) incontinence \par  follow-up with urology, will need cystoscopy eventually \par con myrbetric \par \par 6) neuropathy \par imrpoved now \par \par 7) iron def \par hb stable \par Iron levels improved to a ferritin over 100\par \par 8) b12 def \par cont b12 oral, repeat B12 levels today\par \par 9) health maintenance \par mammogram is up to date,  pap up to date  \par \par 10) restless legs \par on iron , imporved ferritin \par cont mirapex \par \par \par 11)  nausea \par cont sea bands, helping significantly overall with nausea\par Continue medical marijuana\par sig better now \par \par \par 12) anxiety \par follow up psych \par cont current meds and lorazepam prn \par \par 13 weight loss \par sig better now \par now off xeloda and metformin \par \par \par 14) erica \par Now significantly better\par Continue gabapentin and Seroquel, status post follow-up with psychiatry\par \par Discussed with patient and  at length\par Follow-up in 3 weeks \par

## 2021-07-07 NOTE — HISTORY OF PRESENT ILLNESS
[de-identified] : This is a 58 y/o woman wiiht a hx fo stage 4 colon cancer , Kras wt, MSI stable, rectal and sigmoid primary , found to have peritoneal disease at time  of initial staging. \par s/p folfirinox avastin originally then irinotecan dropped for toxicity . S/p FOLFOX avastin, x6 months now on xeloda alone, avastin has been held due to hypertension. \par Patient underwent resection for the 2 primaries as well as debulking in oct 2018, with dr way at [place]. Pathology revealed, complete response in rectum but residual moderately differentiated adenocarcinoma of sigmoid, omentum positive for adeno and right ovary positive for metasatic colon cancer . No perforation was note but pos for perineuural invasion. \par rectum ypT1No\par sigmoid ypT3No M1c \par \par patient has been YOLIS , colonoscopy may 2019 one polyp. \par last ct scan negative for mets, + fatty liver feb 2020 \par \par cea is 3.8 which is up in may \par ct scan june 2020 shows slight increase in adenopathy 8 mm to 1.1 cm \par \par PETCT - done in july 2020 - no uptake in nodes in retroperitoneum \par Foundation testing shows BRCA 2 mutation and also p53 mutation \par \par [facility] - RODOLFO mutation x2(2 diff heterogenous mutations uncertain sig ) , MSH6 heterogenous (undetermined sig) \par \par \par sept 2020 \par vit d 54, ferritin 46 \par mammogram was done at [facility] in [place] on 10/02/20 and was negative\par ct scan in nov 2020 - stable RP nodes , new multiple nodules up to 4 mm in lungs bilaterally , compared to feb 2020 ( no chest done in june 2020) \par \par CT scan of the chest abdomen and pelvis February 2021 revealing for progression of pulmonary metastases with slight increase in size and number of previously noted lung nodules, right lower lobe 6 mm up from 3 mm left upper lobe 5 mm up from 3 mm left lower lobe 6 mm up from 3 mm, the previously described retroperitoneal lymphadenopathy is stable at 1.1 cm there are no other areas of disease.\par \par ct of c/a/p - slight increase of lung nodule- 9mm from 6mm rll, 7 mm from 5mm , 8 mm from 6mm , RP node sstable \par no other areas of disease \par  [de-identified] : \par started gabapentin 400 mg tid , seraquel 150 tid, the pacing has significantly improved her restlessness has significantly improved\par did see regular psychiatrist , he agreed with the gabapentin and increased her Seroquel apparently he had been tapering the Seroquel\par sleeping better \par gained 4 pounds  appetite better \par bowels ok no diarrhea \par no nausea eating after \par no sob or chest pain \par cough with vaping \par no headache or dizziness \par markers are improving \par

## 2021-07-07 NOTE — REVIEW OF SYSTEMS
[Negative] : Allergic/Immunologic [FreeTextEntry7] : improved nausea [de-identified] : Improved pacing/erica

## 2021-07-07 NOTE — PHYSICAL EXAM
[Obese] : obese [Normal] : grossly intact [de-identified] : colostomy with  hernia  [de-identified] : mild hand foot syndrome  [de-identified] : very restless , pacing in room but answers questions appropriately

## 2021-07-10 LAB
ALBUMIN SERPL ELPH-MCNC: 4.5 G/DL
ALP BLD-CCNC: 88 U/L
ALT SERPL-CCNC: 26 U/L
ANION GAP SERPL CALC-SCNC: 13 MMOL/L
APPEARANCE: CLEAR
AST SERPL-CCNC: 34 U/L
BILIRUB SERPL-MCNC: 0.3 MG/DL
BILIRUBIN URINE: NEGATIVE
BLOOD URINE: NEGATIVE
BUN SERPL-MCNC: 16 MG/DL
CALCIUM SERPL-MCNC: 9.4 MG/DL
CEA SERPL-MCNC: 5.7 NG/ML
CHLORIDE SERPL-SCNC: 98 MMOL/L
CO2 SERPL-SCNC: 24 MMOL/L
COLOR: NORMAL
CREAT SERPL-MCNC: 0.81 MG/DL
GLUCOSE QUALITATIVE U: NEGATIVE
GLUCOSE SERPL-MCNC: 49 MG/DL
KETONES URINE: NEGATIVE
LEUKOCYTE ESTERASE URINE: NEGATIVE
MAGNESIUM SERPL-MCNC: 2.1 MG/DL
NITRITE URINE: NEGATIVE
PH URINE: 6.5
PHOSPHATE SERPL-MCNC: 3.5 MG/DL
POTASSIUM SERPL-SCNC: 5.8 MMOL/L
PROT SERPL-MCNC: 7.6 G/DL
PROTEIN URINE: NEGATIVE
SODIUM SERPL-SCNC: 135 MMOL/L
SPECIFIC GRAVITY URINE: 1
UROBILINOGEN URINE: NORMAL

## 2021-07-28 ENCOUNTER — APPOINTMENT (OUTPATIENT)
Dept: HEMATOLOGY ONCOLOGY | Facility: CLINIC | Age: 61
End: 2021-07-28
Payer: COMMERCIAL

## 2021-07-28 VITALS
WEIGHT: 167.5 LBS | BODY MASS INDEX: 30.82 KG/M2 | HEIGHT: 62 IN | TEMPERATURE: 101 F | SYSTOLIC BLOOD PRESSURE: 133 MMHG | OXYGEN SATURATION: 98 % | HEART RATE: 101 BPM | DIASTOLIC BLOOD PRESSURE: 82 MMHG | RESPIRATION RATE: 18 BRPM

## 2021-07-28 PROCEDURE — 99214 OFFICE O/P EST MOD 30 MIN: CPT

## 2021-07-28 PROCEDURE — 99072 ADDL SUPL MATRL&STAF TM PHE: CPT

## 2021-07-28 NOTE — REVIEW OF SYSTEMS
[Fatigue] : fatigue [Diarrhea] : diarrhea [Negative] : Allergic/Immunologic [de-identified] : improved erica

## 2021-07-28 NOTE — ASSESSMENT
[FreeTextEntry1] : This is a 58 y/o woman with a hx of metastatic colorectal cancer, s/p hemicolectomy and debulking procedure. Kras wildtype, MSI stable \par s/p folfox(neoadjuvant)  and in joseph of avastin due to hypertension. Now on maintnace xeloda with out evidence of disease\par foundation testing which found a BRCA mutation \par the invitae testiing does not indcate a brca germline, but did find 2 RODOLFO and one MSH6. (all are VUS) \par \par 1) colon cancer \par doing well overall\par -Patient with progression on Xeloda and Avastin, with worsening lung nodules on the scan in February 2021 now on oxaliplatin, Xeloda and Avastin\par -Cycle 2 of oxaliplatin was stopped after less than half of the infusion due to allergic reaction( 3/24/21) \par -now on irinotecan every 3 weeks \par -CT scan reviewed shows some slight progression in lung nodules but otherwise stable\par -CEA continues to improve on current regimen which is incongruent with the findings on the CT scan so patient was continued on the irinotecan and Avastin\par -Repeat CT of the lung now after this cycle, in order to make sure that there is stable or improving disease\par -ua negative  for protien \par -overdue for colonsocopy we will plan on doing once scans demonstrate stability\par -Proceed with cycle 7     of irinotecan and Avastin, (dose redcued) \par \par \par 2)hand foot syndrome \par stable \par \par 3) vit d def \par cont vit d  \par \par 4) hypertension \par stable \par Continue to monitor blood pressure at home\par \par 5) incontinence \par  follow-up with urology, will need cystoscopy eventually \par con myrbetric \par \par 6) neuropathy \par imrpoved now \par \par 7) iron def \par hb stable \par Iron levels improved to a ferritin over 100\par \par 8) b12 def \par cont b12 oral, repeat B12 levels today\par \par 9) health maintenance \par mammogram is up to date,  pap up to date  \par \par 10) restless legs \par on iron , imporved ferritin \par cont mirapex \par \par \par 11)  nausea \par cont sea bands, helping significantly overall with nausea\par Continue medical marijuana\par now much better \par \par \par 12) anxiety \par s/p f/u dr fuchs will cont to follow up \par planfor taper paxil \par cont current meds and lorazepam prn \par \par 13 weight loss \par now stable \par now off xeloda and metformin \par \par \par 14) erica \par Now significantly better\par Continue gabapentin and Seroquel, cont to  follow-up with psychiatry\par \par Discussed with patient and  at length\par Follow-up in 3 weeks \par

## 2021-07-28 NOTE — PHYSICAL EXAM
[Obese] : obese [Normal] : grossly intact [de-identified] : colostomy with  hernia  [de-identified] : still a bit anxious

## 2021-07-28 NOTE — HISTORY OF PRESENT ILLNESS
[de-identified] : This is a 60 y/o woman wiiht a hx fo stage 4 colon cancer , Kras wt, MSI stable, rectal and sigmoid primary , found to have peritoneal disease at time  of initial staging. \par s/p folfirinox avastin originally then irinotecan dropped for toxicity . S/p FOLFOX avastin, x6 months now on xeloda alone, avastin has been held due to hypertension. \par Patient underwent resection for the 2 primaries as well as debulking in oct 2018, with dr way at [place]. Pathology revealed, complete response in rectum but residual moderately differentiated adenocarcinoma of sigmoid, omentum positive for adeno and right ovary positive for metasatic colon cancer . No perforation was note but pos for perineuural invasion. \par rectum ypT1No\par sigmoid ypT3No M1c \par \par patient has been YOLIS , colonoscopy may 2019 one polyp. \par last ct scan negative for mets, + fatty liver feb 2020 \par \par cea is 3.8 which is up in may \par ct scan june 2020 shows slight increase in adenopathy 8 mm to 1.1 cm \par \par PETCT - done in july 2020 - no uptake in nodes in retroperitoneum \par Foundation testing shows BRCA 2 mutation and also p53 mutation \par \par [facility] - RODOLFO mutation x2(2 diff heterogenous mutations uncertain sig ) , MSH6 heterogenous (undetermined sig) \par \par \par sept 2020 \par vit d 54, ferritin 46 \par mammogram was done at [facility] in [place] on 10/02/20 and was negative\par ct scan in nov 2020 - stable RP nodes , new multiple nodules up to 4 mm in lungs bilaterally , compared to feb 2020 ( no chest done in june 2020) \par \par CT scan of the chest abdomen and pelvis February 2021 revealing for progression of pulmonary metastases with slight increase in size and number of previously noted lung nodules, right lower lobe 6 mm up from 3 mm left upper lobe 5 mm up from 3 mm left lower lobe 6 mm up from 3 mm, the previously described retroperitoneal lymphadenopathy is stable at 1.1 cm there are no other areas of disease.\par \par ct of c/a/p - slight increase of lung nodule- 9mm from 6mm rll, 7 mm from 5mm , 8 mm from 6mm , RP node sstable \par no other areas of disease \par  [de-identified] : \par planning on stopping the paxil , saw another psychiatrist  dr fuchs \par gabapentin and seraquel continues on \par tried cutting back but got worse \par pacing and erica is overall much better \par sleeping a lot better \par cea is 5.7 \par  a little diarrhea occ \par no nausea  this cycle \par weight stable

## 2021-07-30 LAB
25(OH)D3 SERPL-MCNC: 29 NG/ML
FERRITIN SERPL-MCNC: 132 NG/ML
FOLATE SERPL-MCNC: 4.8 NG/ML
IRON SATN MFR SERPL: 39 %
IRON SERPL-MCNC: 147 UG/DL
TIBC SERPL-MCNC: 375 UG/DL
UIBC SERPL-MCNC: 228 UG/DL
VIT B12 SERPL-MCNC: >2000 PG/ML

## 2021-08-18 ENCOUNTER — APPOINTMENT (OUTPATIENT)
Dept: HEMATOLOGY ONCOLOGY | Facility: CLINIC | Age: 61
End: 2021-08-18
Payer: COMMERCIAL

## 2021-08-18 VITALS
HEART RATE: 93 BPM | RESPIRATION RATE: 18 BRPM | OXYGEN SATURATION: 96 % | DIASTOLIC BLOOD PRESSURE: 92 MMHG | HEIGHT: 62 IN | TEMPERATURE: 98.9 F | WEIGHT: 167.9 LBS | BODY MASS INDEX: 30.9 KG/M2 | SYSTOLIC BLOOD PRESSURE: 147 MMHG

## 2021-08-18 PROCEDURE — 99214 OFFICE O/P EST MOD 30 MIN: CPT

## 2021-08-18 NOTE — REVIEW OF SYSTEMS
[Fever] : no fever [Night Sweats] : no night sweats [Fatigue] : fatigue [Incontinence] : incontinence [Joint Pain] : joint pain [Anxiety] : anxiety [Negative] : Heme/Lymph [FreeTextEntry7] : rajendra  [de-identified] : pacing  [de-identified] : erica

## 2021-08-18 NOTE — REASON FOR VISIT
[Follow-Up Visit] : a follow-up [FreeTextEntry2] : Patient presents for follow-up of colorectal cancer

## 2021-08-18 NOTE — ASSESSMENT
[FreeTextEntry1] : This is a 60 y/o woman with a hx of metastatic colorectal cancer, s/p hemicolectomy and debulking procedure. Kras wildtype, MSI stable \par s/p folfox(neoadjuvant)  and in joseph of avastin due to hypertension. Now on maintnace xeloda with out evidence of disease\par foundation testing which found a BRCA mutation \par the [facility] testiing does not indcate a brca germline, but did find 2 RODOLFO and one MSH6. (all are VUS) \par \par 1) colon cancer \par doing well overall\par -Patient with progression on Xeloda and Avastin, with worsening lung nodules on the scan in February 2021 now on oxaliplatin, Xeloda and Avastin\par -Cycle 2 of oxaliplatin was stopped after less than half of the infusion due to allergic reaction( 3/24/21) \par -now on irinotecan every 3 weeks \par -CT scan reviewed shows some slight progression in lung nodules but otherwise stable\par -CEA continues to improve on current regimen which is incongruent with the findings on the CT scan so patient was continued on the irinotecan and Avastin\par -ct scan is now stable july 2021 compare to june 2021 , dw patient and spouse at length , will cont  with current treatment \par -repeat cea \par -ua negative  for protien \par -overdue colonoscopy will schedule and then hold avatin at least 3-4 weeks before \par -bp is high advised patient to follow up with pcp \par -Proceed with cycle 8    of irinotecan and Avastin, (dose redcued) \par \par \par 2)hand foot syndrome \par stable \par \par 3) vit d def \par cont vit d  \par \par 4) hypertension \par higher today , improved prior to avastin \par advise follow up with pcp \par Continue to monitor blood pressure at home\par \par 5) incontinence \par  follow-up with urology, will need cystoscopy eventually \par con myrbetric \par \par 6) neuropathy \par imrpoved now \par \par 7) iron def \par hb stable \par Iron levels improved to a ferritin over 100\par \par 8) b12 def \par cont b12 oral\par \par 9) health maintenance \par mammogram is up to date,  pap up to date  \par \par 10) restless legs \par on iron , imporved ferritin \par cont mirapex \par \par \par 11)  nausea \par cont sea bands, helping significantly overall with nausea\par Continue medical marijuana\par now much better \par \par \par 12) anxiety \par s/p f/u dr fuchs will cont to follow up \par planfor taper paxil \par cont current meds and lorazepam prn \par \par 13 weight loss \par now stable \par now off xeloda and metformin \par \par \par 14) erica \par improved \par Continue gabapentin and Seroquel, cont to  follow-up with psychiatry\par \par Discussed with patient and  at length\par Follow-up in 3 weeks \par

## 2021-08-18 NOTE — HISTORY OF PRESENT ILLNESS
[de-identified] : This is a 60 y/o woman wiiht a hx fo stage 4 colon cancer , Kras wt, MSI stable, rectal and sigmoid primary , found to have peritoneal disease at time  of initial staging. \par s/p folfirinox avastin originally then irinotecan dropped for toxicity . S/p FOLFOX avastin, x6 months now on xeloda alone, avastin has been held due to hypertension. \par Patient underwent resection for the 2 primaries as well as debulking in oct 2018, with dr way at [place]. Pathology revealed, complete response in rectum but residual moderately differentiated adenocarcinoma of sigmoid, omentum positive for adeno and right ovary positive for metasatic colon cancer . No perforation was note but pos for perineuural invasion. \par rectum ypT1No\par sigmoid ypT3No M1c \par \par patient has been YOLIS , colonoscopy may 2019 one polyp. \par last ct scan negative for mets, + fatty liver feb 2020 \par \par cea is 3.8 which is up in may \par ct scan june 2020 shows slight increase in adenopathy 8 mm to 1.1 cm \par \par PETCT - done in july 2020 - no uptake in nodes in retroperitoneum \par Foundation testing shows BRCA 2 mutation and also p53 mutation \par \par [facility] - RODOLFO mutation x2(2 diff heterogenous mutations uncertain sig ) , MSH6 heterogenous (undetermined sig) \par \par \par sept 2020 \par vit d 54, ferritin 46 \par mammogram was done at [facility] in [place] on 10/02/20 and was negative\par ct scan in nov 2020 - stable RP nodes , new multiple nodules up to 4 mm in lungs bilaterally , compared to feb 2020 ( no chest done in june 2020) \par \par CT scan of the chest abdomen and pelvis February 2021 revealing for progression of pulmonary metastases with slight increase in size and number of previously noted lung nodules, right lower lobe 6 mm up from 3 mm left upper lobe 5 mm up from 3 mm left lower lobe 6 mm up from 3 mm, the previously described retroperitoneal lymphadenopathy is stable at 1.1 cm there are no other areas of disease.\par \par ct of c/a/p - slight increase of lung nodule- 9mm from 6mm rll, 7 mm from 5mm , 8 mm from 6mm , RP node sstable \par no other areas of disease \par  [de-identified] : CT scan of the chest performed in July 2021 is stable compared to last month.\par CEA is stable at 5.7\par all meds are the same gabapentin 400 tid , seraquel 150 tid \par sleeping ok \par weight ok \par a lot of nausea didn’t get emend  last cycle \par still with same psych \par trying cutting paxil but pacing worse ,  back on same dose of paxil \par no diarrhea \par

## 2021-08-18 NOTE — PHYSICAL EXAM
[Obese] : obese [Normal] : grossly intact [de-identified] : colostomy with  hernia  [de-identified] : still a bit anxious

## 2021-08-20 LAB — CEA SERPL-MCNC: 8.6 NG/ML

## 2021-09-08 ENCOUNTER — APPOINTMENT (OUTPATIENT)
Dept: HEMATOLOGY ONCOLOGY | Facility: CLINIC | Age: 61
End: 2021-09-08
Payer: COMMERCIAL

## 2021-09-08 VITALS
TEMPERATURE: 98.8 F | BODY MASS INDEX: 30.91 KG/M2 | SYSTOLIC BLOOD PRESSURE: 138 MMHG | RESPIRATION RATE: 17 BRPM | OXYGEN SATURATION: 96 % | DIASTOLIC BLOOD PRESSURE: 84 MMHG | HEIGHT: 62 IN | HEART RATE: 113 BPM | WEIGHT: 168 LBS

## 2021-09-08 PROCEDURE — 99214 OFFICE O/P EST MOD 30 MIN: CPT

## 2021-09-08 NOTE — HISTORY OF PRESENT ILLNESS
[de-identified] : This is a 58 y/o woman wiiht a hx fo stage 4 colon cancer , Kras wt, MSI stable, rectal and sigmoid primary , found to have peritoneal disease at time  of initial staging. \par s/p folfirinox avastin originally then irinotecan dropped for toxicity . S/p FOLFOX avastin, x6 months now on xeloda alone, avastin has been held due to hypertension. \par Patient underwent resection for the 2 primaries as well as debulking in oct 2018, with dr way at [place]. Pathology revealed, complete response in rectum but residual moderately differentiated adenocarcinoma of sigmoid, omentum positive for adeno and right ovary positive for metasatic colon cancer . No perforation was note but pos for perineuural invasion. \par rectum ypT1No\par sigmoid ypT3No M1c \par \par patient has been YOLIS , colonoscopy may 2019 one polyp. \par last ct scan negative for mets, + fatty liver feb 2020 \par \par cea is 3.8 which is up in may \par ct scan june 2020 shows slight increase in adenopathy 8 mm to 1.1 cm \par \par PETCT - done in july 2020 - no uptake in nodes in retroperitoneum \par Foundation testing shows BRCA 2 mutation and also p53 mutation \par \par [facility] - RODOLFO mutation x2(2 diff heterogenous mutations uncertain sig ) , MSH6 heterogenous (undetermined sig) \par \par \par sept 2020 \par vit d 54, ferritin 46 \par mammogram was done at [facility] in [place] on 10/02/20 and was negative\par ct scan in nov 2020 - stable RP nodes , new multiple nodules up to 4 mm in lungs bilaterally , compared to feb 2020 ( no chest done in june 2020) \par \par CT scan of the chest abdomen and pelvis February 2021 revealing for progression of pulmonary metastases with slight increase in size and number of previously noted lung nodules, right lower lobe 6 mm up from 3 mm left upper lobe 5 mm up from 3 mm left lower lobe 6 mm up from 3 mm, the previously described retroperitoneal lymphadenopathy is stable at 1.1 cm there are no other areas of disease.\par \par ct of c/a/p - slight increase of lung nodule- 9mm from 6mm rll, 7 mm from 5mm , 8 mm from 6mm , RP node sstable \par no other areas of disease \par \par CT scan of the chest performed in July 2021 is stable compared to last month. [de-identified] : Overall did much better with the last cycle of chemotherapy\par nausea better \par diarrhea better \par paxil sequel , no change, pacing is better \par sleeping a lot , very tired , sleeping ok at night \par saw dr anthony bp was ok\par due for mammo \par cea was elevated last check at 8.9 \par will be seeing dentist to set up dental work \par also will be setting up colonsocopy

## 2021-09-08 NOTE — REVIEW OF SYSTEMS
[Fatigue] : fatigue [Anxiety] : anxiety [Negative] : Allergic/Immunologic [FreeTextEntry7] : nasuea and diarrhea better  [de-identified] : pacing better

## 2021-09-08 NOTE — ASSESSMENT
[FreeTextEntry1] : This is a 58 y/o woman with a hx of metastatic colorectal cancer, s/p hemicolectomy and debulking procedure. Kras wildtype, MSI stable \par s/p folfox(neoadjuvant)  and in joseph of avastin due to hypertension. Now on maintnace xeloda with out evidence of disease\par foundation testing which found a BRCA mutation \par the [facility] testiing does not indcate a brca germline, but did find 2 RODOLFO and one MSH6. (all are VUS) \par \par 1) colon cancer \par doing well overall\par -Patient with progression on Xeloda and Avastin, with worsening lung nodules on the scan in February 2021 now on oxaliplatin, Xeloda and Avastin\par -Cycle 2 of oxaliplatin was stopped after less than half of the infusion due to allergic reaction( 3/24/21) \par -now on irinotecan every 3 weeks \par -CT scan reviewed shows some slight progression in lung nodules but otherwise stable\par  was continued on the irinotecan and Avastin\par -ct scan is now stable july 2021 compare to june 2021 , dw patient and spouse at length , will cont  with current treatment \par -ua negative  for protien \par -overdue colonoscopy will schedule together with dental work bc will have to be off avastin\par -cea increased repeat today \par -if cea cont to increase will restart xeloda at lowre dose ( 2 in am and 1 in pm ) with irinotecan and avastin \par -saw pcp for high bp , will have follow up \par -Proceed with cycle  9     of irinotecan and Avastin, (dose redcued) \par \par \par 2)hand foot syndrome \par stable \par \par 3) vit d def \par cont vit d  \par \par 4) hypertension \par higher today , improved prior to avastin \par s/p eval with pcp \par Continue to monitor blood pressure at home\par \par 5) incontinence \par  follow-up with urology, will need cystoscopy eventually \par con myrbetric \par \par 6) neuropathy \par imrpoved now \par \par 7) iron def \par hb stable \par Iron levels improved to a ferritin over 100\par \par 8) b12 def \par cont b12 oral\par \par 9) health maintenance \par mammogram is up to date,  pap up to date  \par \par 10) restless legs \par on iron , imporved ferritin \par cont mirapex \par \par \par 11)  nausea \par cont sea bands, helping significantly overall with nausea\par Continue medical marijuana\par now much better on emend \par \par \par 12) anxiety \par s/p f/u dr fuchs will cont to follow up \par plan for taper paxil \par cont current meds and lorazepam prn \par \par 13 weight loss \par now stable \par improved \par now off xeloda and metformin \par \par \par 14) erica \par improved \par Continue gabapentin and Seroquel, cont to  follow-up with psychiatry\par \par Discussed with patient and  at length\par Follow-up in 3 weeks \par

## 2021-09-08 NOTE — PHYSICAL EXAM
[Obese] : obese [Normal] : grossly intact [de-identified] : colostomy with  hernia  [de-identified] : improved anxiety

## 2021-09-12 LAB — CEA SERPL-MCNC: 8 NG/ML

## 2021-09-17 ENCOUNTER — NON-APPOINTMENT (OUTPATIENT)
Age: 61
End: 2021-09-17

## 2021-09-29 ENCOUNTER — APPOINTMENT (OUTPATIENT)
Dept: HEMATOLOGY ONCOLOGY | Facility: CLINIC | Age: 61
End: 2021-09-29
Payer: COMMERCIAL

## 2021-09-29 VITALS
BODY MASS INDEX: 30 KG/M2 | HEART RATE: 117 BPM | TEMPERATURE: 98.4 F | WEIGHT: 163 LBS | DIASTOLIC BLOOD PRESSURE: 91 MMHG | HEIGHT: 62 IN | SYSTOLIC BLOOD PRESSURE: 146 MMHG | OXYGEN SATURATION: 83 % | RESPIRATION RATE: 18 BRPM

## 2021-09-29 PROCEDURE — 99213 OFFICE O/P EST LOW 20 MIN: CPT

## 2021-09-29 NOTE — PHYSICAL EXAM
[Normal] : grossly intact [de-identified] : colostomy with hernia . stool is brown no mucous. no hematochezia  [de-identified] : 5/5 BLE strength  [de-identified] : dry skin. R chest PAC. LLQ colostomy [de-identified] : improved anxiety

## 2021-09-29 NOTE — ASSESSMENT
[FreeTextEntry1] : This is a 60 y/o woman with a hx of metastatic colorectal cancer, s/p hemicolectomy and debulking procedure. Kras wildtype, MSI stable \par s/p folfox(neoadjuvant)  and in joseph of avastin due to hypertension. Now on maintnace xeloda with out evidence of disease\par foundation testing which found a BRCA mutation \par the [facility] testiing does not indcate a brca germline, but did find 2 RODOLFO and one MSH6. (all are VUS) \par \par 1) colon cancer \par doing well overall\par -Patient with progression on Xeloda and Avastin, with worsening lung nodules on the scan in February 2021 now on oxaliplatin, Xeloda and Avastin\par -Cycle 2 of oxaliplatin was stopped after less than half of the infusion due to allergic reaction( 3/24/21) \par -now on irinotecan every 3 weeks \par -CT scan reviewed shows some slight progression in lung nodules but otherwise stable\par  was continued on the irinotecan and Avastin\par -ct scan is now stable july 2021 compare to june 2021 , dw patient and spouse at length , will cont  with current treatment \par -ua negative  for protien \par -overdue colonoscopy will schedule together with dental work bc will have to be off avastin\par -cea increased repeat today \par -if cea cont to increase will restart xeloda at lower dose ( 2 in am and 1 in pm ) with irinotecan and avastin \par -saw pcp for high bp . \par -Proceed with cycle  10  only irinotecan today. Hold Avastin  due to prospective tooth extraction and colonscopy 11/2021. We will continue to hold Avastin 4 weeks before the procedure and 2 weeks after. \par \par 2)hand foot syndrome \par stable \par \par 3) vit d def \par cont vit d  \par \par 4) hypertension \par stable today\par , improved prior to avastin \par s/p eval with pcp \par Continue to monitor blood pressure at home\par \par 5) incontinence \par  follow-up with urology, will need cystoscopy eventually \par con myrbetric \par \par 6) neuropathy \par improved now \par \par 7) iron def \par hb stable \par Iron levels improved to a ferritin over 100\par \par 8) b12 def \par cont b12 oral\par \par 9) health maintenance \par due for mammogram  pap up to date  \par \par 10) restless legs \par on iron , improved ferritin \par cont mirapex \par \par \par 11)  nausea \par cont sea bands, helping significantly overall with nausea\par Continue medical marijuana\par now much better on emend \par \par \par 12) anxiety \par s/p f/u dr fuchs will cont to follow up \par plan for taper paxil \par cont current meds and lorazepam prn \par \par 13 weight loss \par now stable \par improved \par now off xeloda and metformin \par \par \par 14) erica \par improved \par Continue gabapentin and Seroquel, cont to  follow-up with psychiatry\par \par Discussed with patient and  at length\par Follow-up in 3 weeks \par

## 2021-09-29 NOTE — REVIEW OF SYSTEMS
[Fatigue] : fatigue [Anxiety] : anxiety [Negative] : Gastrointestinal [de-identified] : pacing better

## 2021-09-29 NOTE — HISTORY OF PRESENT ILLNESS
[de-identified] : This is a 60 y/o woman wiiht a hx fo stage 4 colon cancer , Kras wt, MSI stable, rectal and sigmoid primary , found to have peritoneal disease at time  of initial staging. \par s/p folfirinox avastin originally then irinotecan dropped for toxicity . S/p FOLFOX avastin, x6 months now on xeloda alone, avastin has been held due to hypertension. \par Patient underwent resection for the 2 primaries as well as debulking in oct 2018, with dr way at [place]. Pathology revealed, complete response in rectum but residual moderately differentiated adenocarcinoma of sigmoid, omentum positive for adeno and right ovary positive for metasatic colon cancer . No perforation was note but pos for perineuural invasion. \par rectum ypT1No\par sigmoid ypT3No M1c \par \par patient has been YOLIS , colonoscopy may 2019 one polyp. \par last ct scan negative for mets, + fatty liver feb 2020 \par \par cea is 3.8 which is up in may \par ct scan june 2020 shows slight increase in adenopathy 8 mm to 1.1 cm \par \par PETCT - done in july 2020 - no uptake in nodes in retroperitoneum \par Foundation testing shows BRCA 2 mutation and also p53 mutation \par \par [facility] - RODOLFO mutation x2(2 diff heterogenous mutations uncertain sig ) , MSH6 heterogenous (undetermined sig) \par \par \par sept 2020 \par vit d 54, ferritin 46 \par mammogram was done at [facility] in [place] on 10/02/20 and was negative\par ct scan in nov 2020 - stable RP nodes , new multiple nodules up to 4 mm in lungs bilaterally , compared to feb 2020 ( no chest done in june 2020) \par \par CT scan of the chest abdomen and pelvis February 2021 revealing for progression of pulmonary metastases with slight increase in size and number of previously noted lung nodules, right lower lobe 6 mm up from 3 mm left upper lobe 5 mm up from 3 mm left lower lobe 6 mm up from 3 mm, the previously described retroperitoneal lymphadenopathy is stable at 1.1 cm there are no other areas of disease.\par \par ct of c/a/p - slight increase of lung nodule- 9mm from 6mm rll, 7 mm from 5mm , 8 mm from 6mm , RP node sstable \par no other areas of disease \par \par CT scan of the chest performed in July 2021 is stable compared to last month. [de-identified] : scheduled tentatively for tooth extraction and colonscopy first week of November. \par diarrhea better\par nausea better\par legs are weak- but able to move better once she stands up \par due for mammo

## 2021-09-29 NOTE — REASON FOR VISIT
[Follow-Up Visit] : a follow-up [Spouse] : spouse [FreeTextEntry2] : Here for follow-up of colon cancer

## 2021-09-30 LAB — CEA SERPL-MCNC: 7.6 NG/ML

## 2021-10-15 ENCOUNTER — NON-APPOINTMENT (OUTPATIENT)
Age: 61
End: 2021-10-15

## 2021-10-20 ENCOUNTER — APPOINTMENT (OUTPATIENT)
Dept: HEMATOLOGY ONCOLOGY | Facility: CLINIC | Age: 61
End: 2021-10-20
Payer: COMMERCIAL

## 2021-10-20 VITALS
BODY MASS INDEX: 29.63 KG/M2 | SYSTOLIC BLOOD PRESSURE: 132 MMHG | OXYGEN SATURATION: 97 % | DIASTOLIC BLOOD PRESSURE: 85 MMHG | RESPIRATION RATE: 18 BRPM | HEIGHT: 62 IN | WEIGHT: 161 LBS | HEART RATE: 87 BPM | TEMPERATURE: 98.1 F

## 2021-10-20 PROCEDURE — 99214 OFFICE O/P EST MOD 30 MIN: CPT

## 2021-10-20 NOTE — ASSESSMENT
[FreeTextEntry1] : This is a 60 y/o woman with a hx of metastatic colorectal cancer, s/p hemicolectomy and debulking procedure. Kras wildtype, MSI stable \par s/p folfox(neoadjuvant)  and in joseph of avastin due to hypertension. Now on maintnace xeloda with out evidence of disease\par foundation testing which found a BRCA mutation \par the [facility] testiing does not indcate a brca germline, but did find 2 RODOLFO and one MSH6. (all are VUS) \par \par 1) colon cancer \par doing well overall\par -Patient with progression on Xeloda and Avastin, with worsening lung nodules on the scan in February 2021 now on oxaliplatin, Xeloda and Avastin\par -Cycle 2 of oxaliplatin was stopped after less than half of the infusion due to allergic reaction( 3/24/21) \par -now on irinotecan every 3 weeks \par -CT scan reviewed shows some slight progression in lung nodules but otherwise stable\par  was continued on the irinotecan and Avastin\par -ct scan is now stable july 2021 compare to june 2021 , dw patient and spouse at length , will cont  with current treatment \par -ua negative  for protien \par -await  colonsocopy \par -cea  improved \par -if cea starts to increase will restart xeloda at lowre dose ( 2 in am and 1 in pm ) with irinotecan and avastin \par -repeat ct scan of c/a/p in nov 2021 \par -Proceed with cycle  11     of irinotecan , (dose redcued)(   hold avastin for colonsocopy and dental  work ) \par \par 2)hand foot syndrome \par stable \par \par 3) vit d def \par cont vit d  \par \par 4) hypertension \par higher today , improved prior to avastin \par s/p eval with pcp \par Continue to monitor blood pressure\par \par 5) incontinence \par  follow-up with urology, will need cystoscopy eventually \par con myrbetric \par \par 6) neuropathy \par imrpoved now \par \par 7) iron def \par hb stable \par Iron levels improved to a ferritin over 100\par \par 8) b12 def \par cont b12 oral\par \par 9) health maintenance \par mammogram is up to date,  pap up to date  \par \par 10) restless legs \par on iron , imporved ferritin \par cont mirapex \par \par \par 11)  nausea \par cont sea bands, helping significantly overall with nausea\par Continue medical marijuana\par now much better on emend \par consider zyprexa if fails to improve \par \par 12) anxiety \par s/p f/u dr fuchs will cont to follow up \par plan for taper paxil \par cont current meds and lorazepam prn \par \par 13 weight loss \par now stable \par improved \par now off xeloda and metformin \par \par \par 14) erica \par improved \par Continue gabapentin and Seroquel, cont to  follow-up with psychiatry\par \par Discussed with patient and  at length\par Follow-up in 3 weeks \par

## 2021-10-20 NOTE — HISTORY OF PRESENT ILLNESS
[de-identified] : This is a 58 y/o woman wiiht a hx fo stage 4 colon cancer , Kras wt, MSI stable, rectal and sigmoid primary , found to have peritoneal disease at time  of initial staging. \par s/p folfirinox avastin originally then irinotecan dropped for toxicity . S/p FOLFOX avastin, x6 months now on xeloda alone, avastin has been held due to hypertension. \par Patient underwent resection for the 2 primaries as well as debulking in oct 2018, with dr way at [place]. Pathology revealed, complete response in rectum but residual moderately differentiated adenocarcinoma of sigmoid, omentum positive for adeno and right ovary positive for metasatic colon cancer . No perforation was note but pos for perineuural invasion. \par rectum ypT1No\par sigmoid ypT3No M1c \par \par patient has been YOLIS , colonoscopy may 2019 one polyp. \par last ct scan negative for mets, + fatty liver feb 2020 \par \par cea is 3.8 which is up in may \par ct scan june 2020 shows slight increase in adenopathy 8 mm to 1.1 cm \par \par PETCT - done in july 2020 - no uptake in nodes in retroperitoneum \par Foundation testing shows BRCA 2 mutation and also p53 mutation \par \par [facility] - RODOLFO mutation x2(2 diff heterogenous mutations uncertain sig ) , MSH6 heterogenous (undetermined sig) \par \par \par sept 2020 \par vit d 54, ferritin 46 \par mammogram was done at [facility] in [place] on 10/02/20 and was negative\par ct scan in nov 2020 - stable RP nodes , new multiple nodules up to 4 mm in lungs bilaterally , compared to feb 2020 ( no chest done in june 2020) \par \par CT scan of the chest abdomen and pelvis February 2021 revealing for progression of pulmonary metastases with slight increase in size and number of previously noted lung nodules, right lower lobe 6 mm up from 3 mm left upper lobe 5 mm up from 3 mm left lower lobe 6 mm up from 3 mm, the previously described retroperitoneal lymphadenopathy is stable at 1.1 cm there are no other areas of disease.\par \par ct of c/a/p - slight increase of lung nodule- 9mm from 6mm rll, 7 mm from 5mm , 8 mm from 6mm , RP node sstable \par no other areas of disease \par \par CT scan of the chest performed in July 2021 is stable compared to last month. [de-identified] : Here for follow up \par a little more nausea ,  each cycle \par very loose for a couple weeks , imodium works \par drinks a lot of fluids \par restless legs , pacing is better , sleeping better at night \par alprazolam ( dr anthony) \par dental and colonoscopy still not done yet bc of scheduling issues \par somedays energy is ok  , walked for 2 hours the other day \par cough is better , breathing better  \par bp is better \par sugars are great

## 2021-10-20 NOTE — PHYSICAL EXAM
[Obese] : obese [Normal] : grossly intact [de-identified] : colostomy with  hernia  [de-identified] : improved anxiety

## 2021-10-24 LAB
CEA SERPL-MCNC: 8 NG/ML
FOLATE SERPL-MCNC: 6.4 NG/ML
VIT B12 SERPL-MCNC: >2000 PG/ML

## 2021-11-10 ENCOUNTER — APPOINTMENT (OUTPATIENT)
Dept: HEMATOLOGY ONCOLOGY | Facility: CLINIC | Age: 61
End: 2021-11-10
Payer: COMMERCIAL

## 2021-11-10 VITALS
HEIGHT: 62 IN | DIASTOLIC BLOOD PRESSURE: 82 MMHG | TEMPERATURE: 98.2 F | RESPIRATION RATE: 18 BRPM | OXYGEN SATURATION: 99 % | HEART RATE: 88 BPM | SYSTOLIC BLOOD PRESSURE: 123 MMHG | WEIGHT: 173 LBS | BODY MASS INDEX: 31.83 KG/M2

## 2021-11-10 PROCEDURE — 99214 OFFICE O/P EST MOD 30 MIN: CPT

## 2021-11-10 NOTE — ASSESSMENT
[FreeTextEntry1] : This is a 58 y/o woman with a hx of metastatic colorectal cancer, s/p hemicolectomy and debulking procedure. Kras wildtype, MSI stable \par s/p folfox(neoadjuvant)  and in joseph of avastin due to hypertension. Now on maintnace xeloda with out evidence of disease\par foundation testing which found a BRCA mutation \par the inv[employer]ae testiing does not indcate a brca germline, but did find 2 RODOLFO and one MSH6. (all are VUS) \par \par 1) colon cancer \par doing well overall\par -Patient with progression on Xeloda and Avastin, with worsening lung nodules on the scan in February 2021 now on oxaliplatin, Xeloda and Avastin\par -Cycle 2 of oxaliplatin was stopped after less than half of the infusion due to allergic reaction( 3/24/21) \par -now on irinotecan every 3 weeks \par -CT scan reviewed shows some slight progression in lung nodules but otherwise stable\par  was continued on the irinotecan and Avastin\par -ct scan is now stable july 2021 compare to june 2021 , dw patient and spouse at length , will cont  with current treatment \par -ua negative  for protien \par -has colonosocopy pending this month \par -cea  improved \par -if cea starts to increase will restart xeloda at lower  dose ( 2 in am and 1 in pm ) with irinotecan and avastin \par -repeat ct scan of c/a/p in nov 2021 \par -Proceed with cycle  12 of irinotecan , (dose redcued)(   hold avastin for colonsocopy , s/p dental work ) \par \par 2)hand foot syndrome \par stable \par \par 3) vit d def \par cont vit d  \par \par 4) hypertension \par higher today , improved prior to avastin \par s/p eval with pcp \par Continue to monitor blood pressure\par \par 5) incontinence \par  follow-up with urology, will need cystoscopy eventually \par con myrbetric \par \par 6) neuropathy \par imrpoved now \par \par 7) iron def \par hb stable \par Iron levels improved to a ferritin over 100\par \par 8) b12 def \par cont b12 oral\par \par 9) health maintenance \par mammogram is up to date,  pap up to date  \par \par 10) restless legs \par on iron , imporved ferritin \par cont mirapex \par \par \par 11)  nausea \par cont sea bands, helping significantly overall with nausea\par Continue medical marijuana\par cont emend \par consider zyprexa if fails to improve \par \par 12) anxiety \par s/p f/u dr fuchs will cont to follow up \par plan for taper paxil \par cont current meds and lorazepam prn \par \par 13 weight loss \par now stable \par improved \par now off xeloda and metformin \par \par 14) erica \par improved \par Continue gabapentin and Seroquel, cont to  follow-up with psychiatry\par \par Discussed with patient and  at length\par Follow-up in 3 weeks \par

## 2021-11-10 NOTE — HISTORY OF PRESENT ILLNESS
[de-identified] : This is a 60 y/o woman wiiht a hx fo stage 4 colon cancer , Kras wt, MSI stable, rectal and sigmoid primary , found to have peritoneal disease at time  of initial staging. \par s/p folfirinox avastin originally then irinotecan dropped for toxicity . S/p FOLFOX avastin, x6 months now on xeloda alone, avastin has been held due to hypertension. \par Patient underwent resection for the 2 primaries as well as debulking in oct 2018, with dr way at [place]. Pathology revealed, complete response in rectum but residual moderately differentiated adenocarcinoma of sigmoid, omentum positive for adeno and right ovary positive for metasatic colon cancer . No perforation was note but pos for perineuural invasion. \par rectum ypT1No\par sigmoid ypT3No M1c \par \par patient has been YOLIS , colonoscopy may 2019 one polyp. \par last ct scan negative for mets, + fatty liver feb 2020 \par \par cea is 3.8 which is up in may \par ct scan june 2020 shows slight increase in adenopathy 8 mm to 1.1 cm \par \par PETCT - done in july 2020 - no uptake in nodes in retroperitoneum \par Foundation testing shows BRCA 2 mutation and also p53 mutation \par \par [facility] - RODOLFO mutation x2(2 diff heterogenous mutations uncertain sig ) , MSH6 heterogenous (undetermined sig) \par \par \par sept 2020 \par vit d 54, ferritin 46 \par mammogram was done at [facility] in [place] on 10/02/20 and was negative\par ct scan in nov 2020 - stable RP nodes , new multiple nodules up to 4 mm in lungs bilaterally , compared to feb 2020 ( no chest done in june 2020) \par \par CT scan of the chest abdomen and pelvis February 2021 revealing for progression of pulmonary metastases with slight increase in size and number of previously noted lung nodules, right lower lobe 6 mm up from 3 mm left upper lobe 5 mm up from 3 mm left lower lobe 6 mm up from 3 mm, the previously described retroperitoneal lymphadenopathy is stable at 1.1 cm there are no other areas of disease.\par \par ct of c/a/p - slight increase of lung nodule- 9mm from 6mm rll, 7 mm from 5mm , 8 mm from 6mm , RP node sstable \par no other areas of disease \par \par CT scan of the chest performed in July 2021 is stable compared to last month. [de-identified] : Feeling well no new complaints \par teeth extracted , on nov 2nd\par last night saw dentist ,\par 19th colonoscopy \par 23 ct scan  schedule \par on antiobitiocs 500 tid , after extraction done on friday \par no sig worsening of GI symptoms \par nausea \par loose bm imodium as needed \par right side pain abd , occasional once day  . out of bed \par no back pain \par breathing ok , cough is stable \par

## 2021-11-10 NOTE — PHYSICAL EXAM
[Obese] : obese [Normal] : grossly intact [de-identified] : colostomy with  hernia  [de-identified] : improved anxiety

## 2021-11-14 LAB — 25(OH)D3 SERPL-MCNC: 29.9 NG/ML

## 2021-12-01 ENCOUNTER — APPOINTMENT (OUTPATIENT)
Dept: HEMATOLOGY ONCOLOGY | Facility: CLINIC | Age: 61
End: 2021-12-01
Payer: COMMERCIAL

## 2021-12-01 VITALS
HEART RATE: 69 BPM | RESPIRATION RATE: 18 BRPM | SYSTOLIC BLOOD PRESSURE: 115 MMHG | HEIGHT: 62 IN | DIASTOLIC BLOOD PRESSURE: 76 MMHG | BODY MASS INDEX: 31.83 KG/M2 | TEMPERATURE: 98 F | WEIGHT: 173 LBS | OXYGEN SATURATION: 100 %

## 2021-12-01 PROCEDURE — 99214 OFFICE O/P EST MOD 30 MIN: CPT

## 2021-12-01 NOTE — REASON FOR VISIT
[Follow-Up Visit] : a follow-up [FreeTextEntry2] : Here for follow up of colorectal cancer , here for chemo

## 2021-12-01 NOTE — ASSESSMENT
[FreeTextEntry1] : This is a 60 y/o woman with a hx of metastatic colorectal cancer, s/p hemicolectomy and debulking procedure. Kras wildtype, MSI stable \par s/p folfox(neoadjuvant)  and in joseph of avastin due to hypertension. Now on maintnace xeloda with out evidence of disease\par foundation testing which found a BRCA mutation \par the [facility] testiing does not indcate a brca germline, but did find 2 RODOLFO and one MSH6. (all are VUS) \par \par 1) colon cancer \par doing well overall\par -Patient with progression on Xeloda and Avastin, with worsening lung nodules on the scan in February 2021 now on oxaliplatin, Xeloda and Avastin\par -Cycle 2 of oxaliplatin was stopped after less than half of the infusion due to allergic reaction( 3/24/21) \par -now on irinotecan every 3 weeks \par -CT scan reviewed shows some slight progression in lung nodules but otherwise stable\par  was continued on the irinotecan and Avastin\par -ua negative  for protien \par -s/p colonosocopy no polyps, follow-up with GI for next year's colonoscopy\par -cea stable, repeat today \par -if cea starts to increase will restart xeloda at lower  dose ( 2 in am and 1 in pm ) with irinotecan and avastin \par -repeat CT scan of the chest abdomen pelvis in November 2021 shows some slight increase in size of the lung nodules but overall stability.  No evidence of any other metastatic disease.  Avastin has been on hold due to\par Dental procedures and colonoscopy.  She has been off for about 2 months now.\par -cont to moniter closely , if any progression consider adding back in 5FU or xeloda \par -discussed possible biopsy, refer to CT surgery for evaluation for either needle biopsy or excisional biopsy pros and cons of each approach.  Additional tissue will provide possibly some information regarding why the lung metastases are more stubborn than the other areas of disease.  We can also look for next generation sequencing on the solid tumor tissue.\par -Proceed with cycle  13 of irinotecan , (dose redcued) add back Avastin as patient is 1 month out from her dental extractions and had a colonoscopy without event discussed the side effects of Avastin including hypertension bleeding thrombosis, perforation among many others\par \par 2)hand foot syndrome \par stable \par \par 3) vit d def \par cont vit d  \par dose increased \par \par 4) hypertension \par higher today , improved prior to avastin \par s/p eval with pcp \par Continue to monitor blood pressure now on avastin today \par \par 5) incontinence \par  follow-up with urology, will need cystoscopy eventually \par con myrbetric \par \par 6) neuropathy \par imrpoved now \par \par 7) iron def \par hb stable \par Iron levels improved to a ferritin over 100, repeat level \par \par 8) b12 def \par cont b12 oral, repeat b12 and folic acid \par \par 9) health maintenance \par mammogram is up to date,  pap up to date  \par \par 10) restless legs \par on iron , imporved ferritin \par cont mirapex \par add CBD to thc as per dispensary \par \par 11)  nausea \par cont sea bands, helping significantly overall with nausea\par Continue medical marijuana\par cont emend \par consider zyprexa if fails to improve \par \par 12) anxiety \par s/p f/u dr fuchs  and prior pysch will cont to follow up \par cont current meds and lorazepam prn \par \par 13 weight loss \par now stable \par improved \par now off xeloda and metformin \par \par 14) erica \par improved \par Continue gabapentin and Seroquel, cont to  follow-up with psychiatry\par \par 15) lung mets \par as above \par refer to ct surgery dr bravo for eval for biopsy/excision \par \par Discussed with patient and  at length\par Follow-up in 3 weeks \par

## 2021-12-01 NOTE — HISTORY OF PRESENT ILLNESS
[de-identified] : This is a 60 y/o woman wiiht a hx fo stage 4 colon cancer , Kras wt, MSI stable, rectal and sigmoid primary , found to have peritoneal disease at time  of initial staging. \par s/p folfirinox avastin originally then irinotecan dropped for toxicity . S/p FOLFOX avastin, x6 months now on xeloda alone, avastin has been held due to hypertension. \par Patient underwent resection for the 2 primaries as well as debulking in oct 2018, with dr way at [place]. Pathology revealed, complete response in rectum but residual moderately differentiated adenocarcinoma of sigmoid, omentum positive for adeno and right ovary positive for metasatic colon cancer . No perforation was note but pos for perineuural invasion. \par rectum ypT1No\par sigmoid ypT3No M1c \par \par patient has been YOLIS , colonoscopy may 2019 one polyp. \par last ct scan negative for mets, + fatty liver feb 2020 \par \par cea is 3.8 which is up in may \par ct scan june 2020 shows slight increase in adenopathy 8 mm to 1.1 cm \par \par PETCT - done in july 2020 - no uptake in nodes in retroperitoneum \par Foundation testing shows BRCA 2 mutation and also p53 mutation \par \par [facility] - RODOLFO mutation x2(2 diff heterogenous mutations uncertain sig ) , MSH6 heterogenous (undetermined sig) \par \par \par sept 2020 \par vit d 54, ferritin 46 \par mammogram was done at [facility] in [place] on 10/02/20 and was negative\par ct scan in nov 2020 - stable RP nodes , new multiple nodules up to 4 mm in lungs bilaterally , compared to feb 2020 ( no chest done in june 2020) \par \par CT scan of the chest abdomen and pelvis February 2021 revealing for progression of pulmonary metastases with slight increase in size and number of previously noted lung nodules, right lower lobe 6 mm up from 3 mm left upper lobe 5 mm up from 3 mm left lower lobe 6 mm up from 3 mm, the previously described retroperitoneal lymphadenopathy is stable at 1.1 cm there are no other areas of disease.\par \par ct of c/a/p - slight increase of lung nodule- 9mm from 6mm rll, 7 mm from 5mm , 8 mm from 6mm , RP node sstable \par no other areas of disease \par \par CT scan of the chest performed in July 2021 is stable compared to last month. [de-identified] : feeling well overall \par after colonoscopy had some constipation and cramping now ok now moving well \par colonsocopy negative nov 19th \par Nov 23 ct scan stable lung nodule \par teeth extracted nov 2\par abd pain is better now \par pacing \par still doing  THC for appetite but adding  more CBD to see if helps with pacing \par weight stable \par \par

## 2021-12-01 NOTE — PHYSICAL EXAM
[Obese] : obese [Normal] : grossly intact [de-identified] : colostomy with  hernia  [de-identified] : improved anxiety

## 2021-12-02 LAB — CEA SERPL-MCNC: 16.9 NG/ML

## 2021-12-08 ENCOUNTER — NON-APPOINTMENT (OUTPATIENT)
Age: 61
End: 2021-12-08

## 2021-12-22 ENCOUNTER — APPOINTMENT (OUTPATIENT)
Dept: HEMATOLOGY ONCOLOGY | Facility: CLINIC | Age: 61
End: 2021-12-22
Payer: COMMERCIAL

## 2021-12-22 VITALS
WEIGHT: 170 LBS | OXYGEN SATURATION: 99 % | HEART RATE: 80 BPM | DIASTOLIC BLOOD PRESSURE: 87 MMHG | TEMPERATURE: 98.2 F | SYSTOLIC BLOOD PRESSURE: 125 MMHG | RESPIRATION RATE: 18 BRPM | BODY MASS INDEX: 31.28 KG/M2 | HEIGHT: 62 IN

## 2021-12-22 PROCEDURE — 99214 OFFICE O/P EST MOD 30 MIN: CPT

## 2021-12-22 NOTE — HISTORY OF PRESENT ILLNESS
[de-identified] : This is a 58 y/o woman wiiht a hx fo stage 4 colon cancer , Kras wt, MSI stable, rectal and sigmoid primary , found to have peritoneal disease at time  of initial staging. \par s/p folfirinox avastin originally then irinotecan dropped for toxicity . S/p FOLFOX avastin, x6 months now on xeloda alone, avastin has been held due to hypertension. \par Patient underwent resection for the 2 primaries as well as debulking in oct 2018, with dr way at [place]. Pathology revealed, complete response in rectum but residual moderately differentiated adenocarcinoma of sigmoid, omentum positive for adeno and right ovary positive for metasatic colon cancer . No perforation was note but pos for perineuural invasion. \par rectum ypT1No\par sigmoid ypT3No M1c \par \par patient has been YOLIS , colonoscopy may 2019 one polyp. \par last ct scan negative for mets, + fatty liver feb 2020 \par \par cea is 3.8 which is up in may \par ct scan june 2020 shows slight increase in adenopathy 8 mm to 1.1 cm \par \par PETCT - done in july 2020 - no uptake in nodes in retroperitoneum \par Foundation testing shows BRCA 2 mutation and also p53 mutation \par \par [facility] - RODOLFO mutation x2(2 diff heterogenous mutations uncertain sig ) , MSH6 heterogenous (undetermined sig) \par \par \par sept 2020 \par vit d 54, ferritin 46 \par mammogram was done at [facility] in [place] on 10/02/20 and was negative\par ct scan in nov 2020 - stable RP nodes , new multiple nodules up to 4 mm in lungs bilaterally , compared to feb 2020 ( no chest done in june 2020) \par \par CT scan of the chest abdomen and pelvis February 2021 revealing for progression of pulmonary metastases with slight increase in size and number of previously noted lung nodules, right lower lobe 6 mm up from 3 mm left upper lobe 5 mm up from 3 mm left lower lobe 6 mm up from 3 mm, the previously described retroperitoneal lymphadenopathy is stable at 1.1 cm there are no other areas of disease.\par \par -Patient with progression on Xeloda and Avastin, with worsening lung nodules on the scan in February 2021 switched to  oxaliplatin, Xeloda and Avastin\par -Cycle 2 of oxaliplatin was stopped after less than half of the infusion due to allergic reaction( 3/24/21) \par -now on irinotecan every 3 weeks ( xeloda dc due to toxicity ) \par -CT scan reviewed shows some slight progression in lung nodules but otherwise stable\par  was continued on the irinotecan and Avastin\par ct of c/a/p - slight increase of lung nodule- 9mm from 6mm rll, 7 mm from 5mm , 8 mm from 6mm , RP node sstable \par no other areas of disease \par \par CT scan of the chest performed in July 2021 is stable compared to last month.\par \par colonsocopy negative nov 19th \par Nov 23 ct scan stable lung nodule  [de-identified] :  \par overall feeling well \par we added back xeloda due to marked elevation in cea , doing one week on and one off \par avastin also added back last cycle \par more liquid stool \par started one tab bid of xeloda last week , this week off \par will start 2 in am and one in pm next week \par a little more nausea \par petct jan 30 , 2021 ( new baseline for elevation in cea) \par gums healed well , will do bridge next time \par breathing is fine \par pacing is better \par 400 mg kylah tid, 150 sequel tid , 40 mg paxil in am \par pain on right side is better \par

## 2021-12-22 NOTE — PHYSICAL EXAM
[Obese] : obese [Normal] : grossly intact [de-identified] : colostomy with  hernia  [de-identified] : improved anxiety

## 2021-12-22 NOTE — ASSESSMENT
[FreeTextEntry1] : This is a 58 y/o woman with a hx of metastatic colorectal cancer, s/p hemicolectomy and debulking procedure. Kras wildtype, MSI stable \par s/p folfox(neoadjuvant)  and in joseph of avastin due to hypertension. Now on maintnace xeloda with out evidence of disease\par foundation testing which found a BRCA mutation \par the inv[employer]ae testiing does not indcate a brca germline, but did find 2 RODOLFO and one MSH6. (all are VUS) \par atient with progression on Xeloda and Avastin, with worsening lung nodules on the scan in February 2021 switched to  oxaliplatin, Xeloda and Avastin\par -Cycle 2 of oxaliplatin was stopped after less than half of the infusion due to allergic reaction( 3/24/21) \par -now on irinotecan every 3 weeks ( xeloda dc due to toxicity ) \par -CT scan reviewed shows some slight progression in lung nodules but otherwise stable\par  was continued on the irinotecan and Avastin \par \par 1) colon cancer \par doing well overall\par -repeat CT scan of the chest abdomen pelvis in November 2021 shows some slight increase in size of the lung nodules but overall stability.  No evidence of any other metastatic disease.  Avastin has been on hold due to\par Dental procedures and colonoscopy.  She has been off for about 2 months now.\par - CEA now increased so added xeloda back in dec 2021.\par -await petct and ct surg eval for possible lung nodule resection to confirm mets and get more molecular info \par -cont current regimen tolerating well \par -up todate on colonscopy \par -Proceed with cycle  14  of irinotecan , (dose redcued) with avastin  and now xeloda\par \par \par 2)hand foot syndrome \par stable \par \par 3) vit d def \par cont vit d  \par dose increased \par \par 4) hypertension \par stable cont to moniter on avastin \par s/p eval with pcp \par \par \par 5) incontinence \par  follow-up with urology, will need cystoscopy eventually \par con myrbetric \par \par 6) neuropathy \par imrpoved now \par \par 7) iron def \par hb stable \par Iron levels improved to a ferritin over 100, repeat level \par \par 8) b12 def \par cont b12 oral, repeat b12 and folic acid \par \par 9) health maintenance \par mammogram is up to date,  pap up to date  \par \par 10) restless legs \par on iron , imporved ferritin \par cont mirapex \par add CBD to thc as per dispensary \par \par 11)  nausea \par cont sea bands, helping significantly overall with nausea\par Continue medical marijuana\par cont emend \par consider zyprexa if fails to improve \par \par 12) anxiety \par s/p f/u dr fuchs  and prior pysch will cont to follow up \par cont current meds and lorazepam prn \par \par 13 weight loss \par now stable \par improved \par \par 14) erica \par improved \par Continue gabapentin and Seroquel, cont to  follow-up with psychiatry\par \par 15) lung mets \par as above \par ct surg and petct \par xeloda added back in \par \par 16) diarrhea \par moniter on xeloda and irinotecan \par imodium prn \par \par Discussed with patient and  at length\par Follow-up in 3 weeks \par

## 2021-12-23 LAB
CEA SERPL-MCNC: 12.4 NG/ML
FERRITIN SERPL-MCNC: 120 NG/ML
FOLATE SERPL-MCNC: 5.1 NG/ML
VIT B12 SERPL-MCNC: >2000 PG/ML

## 2022-01-06 ENCOUNTER — APPOINTMENT (OUTPATIENT)
Dept: THORACIC SURGERY | Facility: CLINIC | Age: 62
End: 2022-01-06
Payer: COMMERCIAL

## 2022-01-06 PROCEDURE — 99204 OFFICE O/P NEW MOD 45 MIN: CPT

## 2022-01-06 RX ORDER — CIPROFLOXACIN HYDROCHLORIDE 500 MG/1
500 TABLET, FILM COATED ORAL
Qty: 14 | Refills: 1 | Status: COMPLETED | COMMUNITY
Start: 2021-12-06 | End: 2022-01-06

## 2022-01-06 RX ORDER — LEVOFLOXACIN 500 MG/1
500 TABLET, FILM COATED ORAL DAILY
Qty: 7 | Refills: 0 | Status: COMPLETED | COMMUNITY
Start: 2021-12-06 | End: 2022-01-06

## 2022-01-06 RX ORDER — AMOXICILLIN AND CLAVULANATE POTASSIUM 500; 125 MG/1; MG/1
500-125 TABLET, FILM COATED ORAL
Qty: 14 | Refills: 0 | Status: COMPLETED | COMMUNITY
Start: 2021-02-05 | End: 2022-01-06

## 2022-01-06 RX ORDER — SOLIFENACIN SUCCINATE 10 MG/1
10 TABLET ORAL
Refills: 0 | Status: COMPLETED | COMMUNITY
End: 2022-01-06

## 2022-01-12 ENCOUNTER — APPOINTMENT (OUTPATIENT)
Dept: HEMATOLOGY ONCOLOGY | Facility: CLINIC | Age: 62
End: 2022-01-12
Payer: COMMERCIAL

## 2022-01-12 VITALS
WEIGHT: 166 LBS | HEIGHT: 62 IN | OXYGEN SATURATION: 99 % | RESPIRATION RATE: 17 BRPM | TEMPERATURE: 98.5 F | DIASTOLIC BLOOD PRESSURE: 89 MMHG | HEART RATE: 84 BPM | BODY MASS INDEX: 30.55 KG/M2 | SYSTOLIC BLOOD PRESSURE: 150 MMHG

## 2022-01-12 PROCEDURE — 99214 OFFICE O/P EST MOD 30 MIN: CPT

## 2022-01-12 NOTE — REVIEW OF SYSTEMS
[Fever] : no fever [Chills] : no chills [Fatigue] : fatigue [Negative] : Allergic/Immunologic [FreeTextEntry8] : incont  [FreeTextEntry9] : back pain as above new  [de-identified] : restless legs

## 2022-01-12 NOTE — HISTORY OF PRESENT ILLNESS
[de-identified] : This is a 58 y/o woman wiiht a hx fo stage 4 colon cancer , Kras wt, MSI stable, rectal and sigmoid primary , found to have peritoneal disease at time  of initial staging. \par s/p folfirinox avastin originally then irinotecan dropped for toxicity . S/p FOLFOX avastin, x6 months now on xeloda alone, avastin has been held due to hypertension. \par Patient underwent resection for the 2 primaries as well as debulking in oct 2018, with dr way at [place]. Pathology revealed, complete response in rectum but residual moderately differentiated adenocarcinoma of sigmoid, omentum positive for adeno and right ovary positive for metasatic colon cancer . No perforation was note but pos for perineuural invasion. \par rectum ypT1No\par sigmoid ypT3No M1c \par \par patient has been YOLIS , colonoscopy may 2019 one polyp. \par last ct scan negative for mets, + fatty liver feb 2020 \par \par cea is 3.8 which is up in may \par ct scan june 2020 shows slight increase in adenopathy 8 mm to 1.1 cm \par \par PETCT - done in july 2020 - no uptake in nodes in retroperitoneum \par Foundation testing shows BRCA 2 mutation and also p53 mutation \par \par [facility] - RODOLFO mutation x2(2 diff heterogenous mutations uncertain sig ) , MSH6 heterogenous (undetermined sig) \par \par \par sept 2020 \par vit d 54, ferritin 46 \par mammogram was done at [facility] ([place], [place]) in [place] on 10/02/20 and was negative\par ct scan in nov 2020 - stable RP nodes , new multiple nodules up to 4 mm in lungs bilaterally , compared to feb 2020 ( no chest done in june 2020) \par \par CT scan of the chest abdomen and pelvis February 2021 revealing for progression of pulmonary metastases with slight increase in size and number of previously noted lung nodules, right lower lobe 6 mm up from 3 mm left upper lobe 5 mm up from 3 mm left lower lobe 6 mm up from 3 mm, the previously described retroperitoneal lymphadenopathy is stable at 1.1 cm there are no other areas of disease.\par \par -Patient with progression on Xeloda and Avastin, with worsening lung nodules on the scan in February 2021 switched to  oxaliplatin, Xeloda and Avastin\par -Cycle 2 of oxaliplatin was stopped after less than half of the infusion due to allergic reaction( 3/24/21) \par -now on irinotecan every 3 weeks ( xeloda dc due to toxicity ) \par -CT scan reviewed shows some slight progression in lung nodules but otherwise stable\par  was continued on the irinotecan and Avastin\par ct of c/a/p - slight increase of lung nodule- 9mm from 6mm rll, 7 mm from 5mm , 8 mm from 6mm , RP node sstable \par no other areas of disease \par \par CT scan of the chest performed in July 2021 is stable compared to last month.\par \par colonsocopy negative nov 19th \par Nov 23 ct scan stable lung nodule  [de-identified] : cea was up to 19 now down to 12 \par diarrhea not bad even with the xeloda \par feet very dry  since xeloda \par pain on spine is new 2 days lumbar spine area \par petct shows progression in lung and RP nodes compared to last year \par saw dr bravo no excsional bx recommended \par poor energy very tired overall  \par cut back on seraqel  150 tid \par restless legs better \par cbd helping and kylah helping \par appetite a little worse \par \par

## 2022-01-12 NOTE — PHYSICAL EXAM
[Obese] : obese [Normal] : grossly intact [de-identified] : colostomy with  hernia  [de-identified] : very anxious

## 2022-01-12 NOTE — ASSESSMENT
[FreeTextEntry1] : This is a 58 y/o woman with a hx of metastatic colorectal cancer, s/p hemicolectomy and debulking procedure. Kras wildtype, MSI stable \par s/p folfox(neoadjuvant)  and in joseph of avastin due to hypertension. Now on maintnace xeloda with out evidence of disease\par foundation testing which found a BRCA mutation \par the inv[facility]ae testiing does not indcate a brca germline, but did find 2 RODOLFO and one MSH6. (all are VUS) \par atient with progression on Xeloda and Avastin, with worsening lung nodules on the scan in February 2021 switched to  oxaliplatin, Xeloda and Avastin\par -Cycle 2 of oxaliplatin was stopped after less than half of the infusion due to allergic reaction( 3/24/21) \par -now on irinotecan every 3 weeks ( xeloda dc due to toxicity ) \par -CT scan reviewed shows some slight progression in lung nodules but otherwise stable\par  was continued on the irinotecan and Avastin \par \par 1) colon cancer \par doing well overall\par -repeat CT scan of the chest abdomen pelvis in November 2021 shows some slight increase in size of the lung nodules but overall stability.  No evidence of any other metastatic disease.  Avastin has been on hold due to\par Dental procedures and colonoscopy.  She has been off for about 2 months now.\par - CEA now increased so added xeloda back in dec 2021.\par -Since Avastin restarted and Xeloda added back the CEA has gone from 19 down to 12.\par -up todate on colonscopy \par -PET/CT reviewed shows PET avid disease in the lungs and retroperitoneum, progressive compared to prior but this last PET scan was over a year ago. No evidence of other areas of disease ( such as bone or liver) \par -Appreciate CT surgery evaluation no immediate plans for surgical resection of any the nodules due to the invasive nature of the procedures\par -Proceed with cycle  15   of irinotecan , (dose redcued) with avastin  and now xeloda( added in dec ) \par \par \par 2)hand foot syndrome \par stable \par \par 3) vit d def \par cont vit d  \par dose increased \par \par 4) hypertension \par stable cont to moniter on avastin \par s/p eval with pcp \par \par \par 5) incontinence \par  follow-up with urology, will need cystoscopy eventually \par con myrbetric \par \par 6) neuropathy \par imrpoved now \par \par 7) iron def \par hb stable \par Iron levels improved to a ferritin over 100, repeat level \par \par 8) b12 def \par cont b12 oral, repeat b12 and folic acid \par \par 9) health maintenance \par mammogram is up to date,  pap up to date  \par \par 10) restless legs \par on iron , imporved ferritin \par cont mirapex \par cont CBD \par \par 11)  nausea \par cont sea bands, helping significantly overall with nausea\par Continue medical marijuana\par cont emend \par consider zyprexa if fails to improve \par \par 12) anxiety \par s/p f/u dr fuchs  and prior pysch will cont to follow up \par cont current meds and lorazepam prn \par \par 13 weight loss \par now stable \par improved \par \par 14) erica \par improved \par Continue gabapentin and Seroquel, cont to  follow-up with psychiatry\par \par 15) lung mets \par as above \par s/p petct as above , s/p ct surg eval \par assymptomatic \par no bx for now \par xeloda added back in \par \par 16) diarrhea \par stable /managable \par moniter on xeloda and irinotecan \par imodium prn \par \par Discussed with patient and  at length\par Follow-up in 3 weeks \par

## 2022-01-13 NOTE — ASSESSMENT
[FreeTextEntry1] : This patient is a 61-year-old female with stage IV colon cancer.  The patient has multiple pulmonary metastasis that have grown slightly in size.  The patient denies any respiratory compromise.  The patient is here to discuss the possibility of biopsy.  Biopsy would require a video-assisted thoracoscopy with wedge resection to acquire tissue to evaluate.  This would require 1 night in the hospital and a chest tube.  I discussed all the risks and benefits with the patient and her family.  I would proceed if her oncologist feels that it would help with treatment.  She will be seeing her oncologist this afternoon.  If she wishes to proceed with excisional biopsy she will give my office a call.  All risk benefits and alternatives were discussed in detail with the patient.

## 2022-01-13 NOTE — HISTORY OF PRESENT ILLNESS
[FreeTextEntry1] : This is a 61 year old female, pmhx colon ca s/o hemicolectomy, GERD, iron deficiency, restless legs, erica, and vitamin d deficiency. Patient is a former smoker more than 20 years ago and reports smoking one pack per week. CT form February and June 2020 did not show any ling nodules. CT from October 2020 did show multiple new groundglass nodules. CT from February 2021 showed progression of these nodules with slight increase in size and number. CT June 2021 again showed a slight increase in size and number of nodules as compared to February 2021. There was no change on CT from August 2021. Last CT from November 2021 showed some nodules that were overall stable to slightly increased in size and number. Patient currently on xeloda, irinotecan, and avastin. Patient presents to office to discuss biopsy. \par \par The patient denies any respiratory complaints.  She is here to discuss the possibility of getting tissue to further evaluate the lesions for possible change in treatment.

## 2022-01-13 NOTE — DATA REVIEWED
[FreeTextEntry1] : CT Chest/abdomen/pelvis 11/23/21:\par \par Lungs/pleura: There are multiple bilateral pulmonary nodules, overall stable to slightly increased in size and number compared to 8/6/21. 0.8 x 0.6 cm posterior left upper lobe nodule with central cavitation, previously 0.7 x 0.5 cm. 0.8 x 0.6 cm left lower lobe nodule, previously 0.7 x 0.6 cm. 1.0 x 0.9 cm right lower lobe nodule, previously 0.9 x 0.8 cm. \par \par CT Chest 8/6/21: \par \par Lungs/pleura: There are multiple bilateral pulmonary nodules throughout the lungs which appear to be stable in size and number from 6/7/21. Increased in size and number from 2/9/21. The largest nodule is in the right lower lobe posteriroly measuring 9 x 8 mm. \par \par CT chest/abdomen/pelvis 6/7/21: \par \par There are multiple bilateral pulmonary nodules throughout the lungs. There appears to be a slight increase in size and number of nodules when compared to 2/9/21. 9 mm right lower lobe nodule, previously 6 mm. 7 mm right lower lobe nodule with new punctate central cavitation, previously 5 mm. 8 mm nodule left lower lobe, previously 6 mm. \par \par CT chest/abdomen/pelvis 2/9/21:\par \par Lungs/pleura: There is progression of pulmonary metastasis with slight increase in size and number of previously noted lung nodules with representative lesions as follows: Right lower lobe 6 mm nodule, previously 3 mm. Left upper lobe 5 mm nodule, previously 3 mm. Left lower lobe 6 mm nodule, previously 3 mm. \par \par Lymph nodes/fluid: There are stable retroperitoneal lymph nodes measuring up to 1.1 cm in short axis. \par \par CT chest/abdomen/pelvis 10/29/20:\par \par Lungs/pleura; There are a few groundglass pulmonary nodules, new since prior examination. For example, there are groundglass nodule in the left apex measuring 4 mm, 3 mm nodule in the posterior left lung apex, 3 mm anterior left upper lobe pulmonary nodule, 4 mm nodule in the posterior left lower lobe. Groundglass nodules in the right lower lobe measure 4 mm. There is no pleural effusion or pneunothorax. Interval development of multiple subcentimeter pulmonary nodules. These are worrisome for metastases. Stable, shotty retroperitoneal lymph nodes, not significantly changed since 6/8/20. \par \par \par CT abdomen/pelvis 6/8/20:\par \par Shotty retroperitoneal lymph nodes again identified some of which have increased in size. Mild splenomegaly again seen. Stable postsurgical changes. Hepatic steatosis. \par \par CT Chest/abdomen/pelvis 2/3/20:\par \par No suspicious mass or adenopathy in the chest, abdomen, or pelvis. Stable postsurgical changes. Stable small parastomal hernia containing a short segment of nondilated nonthickened small bowel. Hepatic steatosis with scattered foci of sparing. Splenomegaly, unchanged. \par

## 2022-01-13 NOTE — PHYSICAL EXAM
[General Appearance - Alert] : alert [General Appearance - In No Acute Distress] : in no acute distress [Sclera] : the sclera and conjunctiva were normal [PERRL With Normal Accommodation] : pupils were equal in size, round, and reactive to light [Extraocular Movements] : extraocular movements were intact [Outer Ear] : the ears and nose were normal in appearance [Oropharynx] : the oropharynx was normal [Neck Appearance] : the appearance of the neck was normal [Neck Cervical Mass (___cm)] : no neck mass was observed [Jugular Venous Distention Increased] : there was no jugular-venous distention [Thyroid Diffuse Enlargement] : the thyroid was not enlarged [Thyroid Nodule] : there were no palpable thyroid nodules [Auscultation Breath Sounds / Voice Sounds] : lungs were clear to auscultation bilaterally [Heart Rate And Rhythm] : heart rate was normal and rhythm regular [Heart Sounds] : normal S1 and S2 [Heart Sounds Gallop] : no gallops [Murmurs] : no murmurs [Heart Sounds Pericardial Friction Rub] : no pericardial rub [Bowel Sounds] : normal bowel sounds [Abdomen Soft] : soft [Abdomen Tenderness] : non-tender [] : no hepato-splenomegaly [Abdomen Mass (___ Cm)] : no abdominal mass palpated [Cervical Lymph Nodes Enlarged Posterior Bilaterally] : posterior cervical [Cervical Lymph Nodes Enlarged Anterior Bilaterally] : anterior cervical [Supraclavicular Lymph Nodes Enlarged Bilaterally] : supraclavicular [Axillary Lymph Nodes Enlarged Bilaterally] : axillary [Femoral Lymph Nodes Enlarged Bilaterally] : femoral [Inguinal Lymph Nodes Enlarged Bilaterally] : inguinal

## 2022-01-16 LAB — CEA SERPL-MCNC: 15 NG/ML

## 2022-02-02 ENCOUNTER — APPOINTMENT (OUTPATIENT)
Dept: HEMATOLOGY ONCOLOGY | Facility: CLINIC | Age: 62
End: 2022-02-02
Payer: COMMERCIAL

## 2022-02-02 VITALS
SYSTOLIC BLOOD PRESSURE: 124 MMHG | BODY MASS INDEX: 30.73 KG/M2 | HEIGHT: 62 IN | DIASTOLIC BLOOD PRESSURE: 80 MMHG | HEART RATE: 90 BPM | RESPIRATION RATE: 18 BRPM | WEIGHT: 167 LBS | OXYGEN SATURATION: 100 % | TEMPERATURE: 99.6 F

## 2022-02-02 DIAGNOSIS — R32 UNSPECIFIED URINARY INCONTINENCE: ICD-10-CM

## 2022-02-02 PROCEDURE — 99214 OFFICE O/P EST MOD 30 MIN: CPT

## 2022-02-02 NOTE — HISTORY OF PRESENT ILLNESS
[de-identified] : This is a 58 y/o woman wiiht a hx fo stage 4 colon cancer , Kras wt, MSI stable, rectal and sigmoid primary , found to have peritoneal disease at time  of initial staging. \par s/p folfirinox avastin originally then irinotecan dropped for toxicity . S/p FOLFOX avastin, x6 months now on xeloda alone, avastin has been held due to hypertension. \par Patient underwent resection for the 2 primaries as well as debulking in oct 2018, with dr way at [place]. Pathology revealed, complete response in rectum but residual moderately differentiated adenocarcinoma of sigmoid, omentum positive for adeno and right ovary positive for metasatic colon cancer . No perforation was note but pos for perineuural invasion. \par rectum ypT1No\par sigmoid ypT3No M1c \par \par patient has been YOLIS , colonoscopy may 2019 one polyp. \par last ct scan negative for mets, + fatty liver feb 2020 \par \par cea is 3.8 which is up in may \par ct scan june 2020 shows slight increase in adenopathy 8 mm to 1.1 cm \par \par PETCT - done in july 2020 - no uptake in nodes in retroperitoneum \par Foundation testing shows BRCA 2 mutation and also p53 mutation \par \par [facility] - RODOLFO mutation x2(2 diff heterogenous mutations uncertain sig ) , MSH6 heterogenous (undetermined sig) \par \par \par sept 2020 \par vit d 54, ferritin 46 \par mammogram was done at [facility] in [place] on 10/02/20 and was negative\par ct scan in nov 2020 - stable RP nodes , new multiple nodules up to 4 mm in lungs bilaterally , compared to feb 2020 ( no chest done in june 2020) \par \par CT scan of the chest abdomen and pelvis February 2021 revealing for progression of pulmonary metastases with slight increase in size and number of previously noted lung nodules, right lower lobe 6 mm up from 3 mm left upper lobe 5 mm up from 3 mm left lower lobe 6 mm up from 3 mm, the previously described retroperitoneal lymphadenopathy is stable at 1.1 cm there are no other areas of disease.\par \par -Patient with progression on Xeloda and Avastin, with worsening lung nodules on the scan in February 2021 switched to  oxaliplatin, Xeloda and Avastin\par -Cycle 2 of oxaliplatin was stopped after less than half of the infusion due to allergic reaction( 3/24/21) \par -now on irinotecan every 3 weeks ( xeloda dc due to toxicity ) \par -CT scan reviewed shows some slight progression in lung nodules but otherwise stable\par  was continued on the irinotecan and Avastin\par ct of c/a/p - slight increase of lung nodule- 9mm from 6mm rll, 7 mm from 5mm , 8 mm from 6mm , RP node sstable \par no other areas of disease \par \par CT scan of the chest performed in July 2021 is stable compared to last month.\par \par colonsocopy negative nov 19th \par Nov 23 ct scan stable lung nodule \par \par petct shows progression in lung and RP nodes compared to last year \par saw dr bravo no excsional bx recommended  [de-identified] : low energy overall \par more nausea \par no increase in diarrhea \par restless legs stable \par cbd \par back and abd pain better \par weight is stable \par bp has been \par no bleeding

## 2022-02-02 NOTE — ASSESSMENT
[FreeTextEntry1] : This is a  60 y/o woman with a hx of metastatic colorectal cancer, s/p hemicolectomy and debulking procedure. Kras wildtype, MSI stable \par s/p folfox(neoadjuvant)  and in joseph of avastin due to hypertension. Now on maintnace xeloda with out evidence of disease\par foundation testing which found a BRCA mutation \par the [facility] testiing does not indcate a brca germline, but did find 2 RODOLFO and one MSH6. (all are VUS) \par atient with progression on Xeloda and Avastin, with worsening lung nodules on the scan in February 2021 switched to  oxaliplatin, Xeloda and Avastin\par -Cycle 2 of oxaliplatin was stopped after less than half of the infusion due to allergic reaction( 3/24/21) \par -now on irinotecan every 3 weeks ( xeloda dc due to toxicity ) \par -CT scan reviewed shows some slight progression in lung nodules but otherwise stable\par  was continued on the irinotecan and Avastin \par \par 1) colon cancer \par doing well overall\par -repeat CT scan of the chest abdomen pelvis in November 2021 shows some slight increase in size of the lung nodules but overall stability.  No evidence of any other metastatic disease.  Avastin has been on hold due to\par Dental procedures and colonoscopy.  She was  off for about 2 months\par - CEA now increased so added xeloda back in dec 2021.\par -Since Avastin restarted and Xeloda added back the CEA has gone from 19 down to 12.\par -up todate on colonscopy \par -PET/CT reviewed shows PET avid disease in the lungs and retroperitoneum, progressive compared to prior but this last PET scan was over a year ago. No evidence of other areas of disease ( such as bone or liver) \par -Appreciate CT surgery evaluation no immediate plans for surgical resection of any the nodules due to the invasive nature of the procedures\par -on cycle  16  of irinotecan , (dose redcued) with avastin  and now xeloda( added in dec ) \par -CEA slightly elevated in January 2022 discussed with patient and spouse today, will repeat today\par -Tentatively plan for repeat scans in March but will do sooner if markers continue to increase, discussed plans to switch Strivarga  or Lonsurf if progressive disease\par -check urine for protein next visit \par -For now continue with irinotecan Avastin and Xeloda ,  tolerating well\par \par \par 2)hand foot syndrome \par stable \par \par 3) vit d def \par cont vit d  \par dose increased \par \par 4) hypertension \par stable,   cont to moniter on avastin \par s/p eval with pcp  will be seeing dr rangel for new pcp \par \par \par 5) incontinence \par  follow-up with urology, will need cystoscopy eventually \par now off mybetric \par \par 6) neuropathy \par imrpoved now \par \par 7) iron def \par hb stable \par Iron levels improved to a ferritin over 100, repeat level \par \par 8) b12 def \par cont b12 oral, repeat b12 and folic acid \par \par 9) health maintenance \par mammogram is up to date,  pap up to date  \par \par 10) restless legs \par on iron , imporved ferritin \par cont mirapex \par cont CBD \par \par 11)  nausea \par cont sea bands, helping significantly overall with nausea\par Continue medical marijuana\par cont emend \par \par 12) anxiety \par s/p f/u dr fuchs  and prior pysch will cont to follow up \par cont current meds and lorazepam prn \par \par 13 weight loss \par now stable \par improved \par \par 14) erica \par improved \par Continue gabapentin and Seroquel, cont to  follow-up with psychiatry\par \par 15) lung mets \par as above \par s/p petct as above , s/p ct surg eval \par assymptomatic \par no bx for now \par now on xeloda with avastin and irinotecan \par \par 16) diarrhea \par stable /managable \par imodium prn \par \par Discussed with patient and  at length, proceed with chemo \par Follow-up in 3 weeks \par

## 2022-02-02 NOTE — REVIEW OF SYSTEMS
[Fatigue] : fatigue [Recent Change In Weight] : ~T no recent weight change [Negative] : Allergic/Immunologic [FreeTextEntry7] : diarrhea stable  [de-identified] : erica

## 2022-02-02 NOTE — PHYSICAL EXAM
[Obese] : obese [Normal] : grossly intact [de-identified] : colostomy with  hernia  [de-identified] : very anxious

## 2022-02-04 LAB — CEA SERPL-MCNC: 13.7 NG/ML

## 2022-02-23 ENCOUNTER — APPOINTMENT (OUTPATIENT)
Dept: HEMATOLOGY ONCOLOGY | Facility: CLINIC | Age: 62
End: 2022-02-23
Payer: COMMERCIAL

## 2022-02-23 VITALS
BODY MASS INDEX: 30.36 KG/M2 | HEART RATE: 103 BPM | HEIGHT: 62 IN | TEMPERATURE: 98.9 F | DIASTOLIC BLOOD PRESSURE: 80 MMHG | RESPIRATION RATE: 18 BRPM | OXYGEN SATURATION: 97 % | SYSTOLIC BLOOD PRESSURE: 127 MMHG | WEIGHT: 165 LBS

## 2022-02-23 DIAGNOSIS — K21.9 GASTRO-ESOPHAGEAL REFLUX DISEASE W/OUT ESOPHAGITIS: ICD-10-CM

## 2022-02-23 PROCEDURE — 99213 OFFICE O/P EST LOW 20 MIN: CPT

## 2022-02-23 NOTE — REASON FOR VISIT
[Follow-Up Visit] : a follow-up [Spouse] : spouse [FreeTextEntry2] : Patient presents for follow-up of colon cancer

## 2022-02-23 NOTE — PHYSICAL EXAM
[Obese] : obese [Normal] : grossly intact [de-identified] : colostomy with  hernia . stool color brownish yellow. no blood noted in colostomy bag.  [de-identified] : R PAC dressing c/d/i.  [de-identified] : very anxious

## 2022-02-23 NOTE — REVIEW OF SYSTEMS
[Negative] : Allergic/Immunologic [Fatigue] : no fatigue [Recent Change In Weight] : ~T no recent weight change [Diarrhea] : no diarrhea [FreeTextEntry7] : diarrhea stable  [de-identified] : erica

## 2022-02-23 NOTE — ASSESSMENT
[FreeTextEntry1] : This is a  60 y/o woman with a hx of metastatic colorectal cancer, s/p hemicolectomy and debulking procedure. Kras wildtype, MSI stable \par s/p folfox(neoadjuvant)  and in joseph of avastin due to hypertension. Now on maintnace xeloda with out evidence of disease\par foundation testing which found a BRCA mutation \par the invitae testiing does not indcate a brca germline, but did find 2 RODOLFO and one MSH6. (all are VUS) \par atient with progression on Xeloda and Avastin, with worsening lung nodules on the scan in February 2021 switched to  oxaliplatin, Xeloda and Avastin\par -Cycle 2 of oxaliplatin was stopped after less than half of the infusion due to allergic reaction( 3/24/21) \par -now on irinotecan every 3 weeks ( xeloda dc due to toxicity ) \par -CT scan reviewed shows some slight progression in lung nodules but otherwise stable\par  was continued on the irinotecan and Avastin \par \par 1) colon cancer \par doing well overall\par -repeat CT scan of the chest abdomen pelvis in November 2021 shows some slight increase in size of the lung nodules but overall stability.  No evidence of any other metastatic disease.  Avastin has been on hold due to\par Dental procedures and colonoscopy.  She was  off for about 2 months\par - CEA now increased so added xeloda back in dec 2021.\par -Since Avastin restarted and Xeloda added back the CEA has gone from 19 down to 12.\par -up todate on colonscopy \par -PET/CT reviewed shows PET avid disease in the lungs and retroperitoneum, progressive compared to prior but this last PET scan was over a year ago. No evidence of other areas of disease ( such as bone or liver) \par -Appreciate CT surgery evaluation no immediate plans for surgical resection of any the nodules due to the invasive nature of the procedures\par -on cycle  16  of irinotecan , (dose redcued) with avastin  and now xeloda( added in dec ) \par -CEA slightly elevated in January 2022 discussed with patient and spouse today, will repeat today\par -Tentatively plan for repeat scans in March but will do sooner if markers continue to increase, discussed plans to switch Strivarga  or Lonsurf if progressive disease. \par -check urine for protein next visit \par -For now continue with irinotecan Avastin and Xeloda ,  tolerating well. \par - markers improving slowly. repeat CT c/a/p due now. \par \par \par 2)hand foot syndrome \par stable \par \par 3) vit d def \par cont vit d  \par dose increased \par \par 4) hypertension \par stable,   cont to moniter on avastin \par s/p eval with pcp  will be seeing dr rangel for new pcp \par \par \par 5) incontinence \par  follow-up with urology, will need cystoscopy eventually \par now off mybetric \par \par 6) neuropathy \par improved now \par \par 7) iron def \par hgb stable \par Iron levels improved to a ferritin over 100, repeat level \par \par 8) b12 def \par cont b12 oral, repeat b12 and folic acid \par \par 9) health maintenance \par mammogram is up to date,  pap up to date  \par \par 10) restless legs \par lowered dose of seroquel does not help\par on iron , improved ferritin \par cont mirapex \par cont CBD \par \par 11)  nausea \par cont sea bands, helping significantly overall with nausea\par Continue medical marijuana\par cont emend \par \par 12) anxiety \par s/p f/u dr fuchs  and prior pysch will cont to follow up \par cont current meds and lorazepam prn \par \par 13 weight loss \par now stable \par improved \par \par 14) erica \par improved \par Continue gabapentin and Seroquel, cont to  follow-up with psychiatry\par \par 15) lung mets \par as above \par s/p petct as above , s/p ct surg eval \par assymptomatic \par no bx for now \par now on xeloda with avastin and irinotecan \par \par 16) diarrhea \par stable /managable \par imodium prn \par \par 17) rhinitis \par occurs after taking zrytec? \par consider switching to claritin \par \par Discussed with patient and  at length, proceed with chemo \par Follow-up in 3 weeks \par

## 2022-02-23 NOTE — HISTORY OF PRESENT ILLNESS
[de-identified] : This is a 60 y/o woman wiiht a hx fo stage 4 colon cancer , Kras wt, MSI stable, rectal and sigmoid primary , found to have peritoneal disease at time  of initial staging. \par s/p folfirinox avastin originally then irinotecan dropped for toxicity . S/p FOLFOX avastin, x6 months now on xeloda alone, avastin has been held due to hypertension. \par Patient underwent resection for the 2 primaries as well as debulking in oct 2018, with dr way at [place]. Pathology revealed, complete response in rectum but residual moderately differentiated adenocarcinoma of sigmoid, omentum positive for adeno and right ovary positive for metasatic colon cancer . No perforation was note but pos for perineuural invasion. \par rectum ypT1No\par sigmoid ypT3No M1c \par \par patient has been YOLIS , colonoscopy may 2019 one polyp. \par last ct scan negative for mets, + fatty liver feb 2020 \par \par cea is 3.8 which is up in may \par ct scan june 2020 shows slight increase in adenopathy 8 mm to 1.1 cm \par \par PETCT - done in july 2020 - no uptake in nodes in retroperitoneum \par Foundation testing shows BRCA 2 mutation and also p53 mutation \par \par Invitae - RODOLFO mutation x2(2 diff heterogenous mutations uncertain sig ) , MSH6 heterogenous (undetermined sig) \par \par \par sept 2020 \par vit d 54, ferritin 46 \par mammogram was done at [facility] in [place] on 10/02/20 and was negative\par ct scan in nov 2020 - stable RP nodes , new multiple nodules up to 4 mm in lungs bilaterally , compared to feb 2020 ( no chest done in june 2020) \par \par CT scan of the chest abdomen and pelvis February 2021 revealing for progression of pulmonary metastases with slight increase in size and number of previously noted lung nodules, right lower lobe 6 mm up from 3 mm left upper lobe 5 mm up from 3 mm left lower lobe 6 mm up from 3 mm, the previously described retroperitoneal lymphadenopathy is stable at 1.1 cm there are no other areas of disease.\par \par -Patient with progression on Xeloda and Avastin, with worsening lung nodules on the scan in February 2021 switched to  oxaliplatin, Xeloda and Avastin\par -Cycle 2 of oxaliplatin was stopped after less than half of the infusion due to allergic reaction( 3/24/21) \par -now on irinotecan every 3 weeks ( xeloda dc due to toxicity ) \par -CT scan reviewed shows some slight progression in lung nodules but otherwise stable\par  was continued on the irinotecan and Avastin\par ct of c/a/p - slight increase of lung nodule- 9mm from 6mm rll, 7 mm from 5mm , 8 mm from 6mm , RP node sstable \par no other areas of disease \par \par CT scan of the chest performed in July 2021 is stable compared to last month.\par \par colonsocopy negative nov 19th \par Nov 23 ct scan stable lung nodule \par \par petct shows progression in lung and RP nodes compared to last year \par saw dr bravo no excsional bx recommended  [de-identified] : Psych lowered seroquel from 150mg to 125mg \par stools still loose and same frequency, not worse. not sure if its a combo of xeloda and irinotecan. \par energy good \par nausea better-sea bands and zofran help \par sometimes has rhinitis with zrytec

## 2022-02-25 LAB
25(OH)D3 SERPL-MCNC: 41.5 NG/ML
CEA SERPL-MCNC: 14.1 NG/ML

## 2022-03-16 ENCOUNTER — APPOINTMENT (OUTPATIENT)
Dept: HEMATOLOGY ONCOLOGY | Facility: CLINIC | Age: 62
End: 2022-03-16
Payer: COMMERCIAL

## 2022-03-16 VITALS
BODY MASS INDEX: 30.36 KG/M2 | OXYGEN SATURATION: 97 % | HEIGHT: 62 IN | WEIGHT: 165 LBS | TEMPERATURE: 97 F | HEART RATE: 81 BPM | DIASTOLIC BLOOD PRESSURE: 95 MMHG | SYSTOLIC BLOOD PRESSURE: 161 MMHG | RESPIRATION RATE: 18 BRPM

## 2022-03-16 LAB — CEA SERPL-MCNC: 17.7 NG/ML

## 2022-03-16 PROCEDURE — 99214 OFFICE O/P EST MOD 30 MIN: CPT

## 2022-03-16 NOTE — PHYSICAL EXAM
[Obese] : obese [Normal] : grossly intact [de-identified] : colostomy with  hernia , non tender no masses  [de-identified] : very anxious

## 2022-03-16 NOTE — HISTORY OF PRESENT ILLNESS
[de-identified] : Overall feeling well\par erica got worse , psych  thinks its the seraquel \par talked to psych , and started new med - benzatropine \par will be reducing seqaquel and gabapentin \par legs are better \par legs are still sore \par tired with each cycle \par memory is worse \par appetite is worse \par breathing ok \par mucus in nose - 2 weeks ago antibiotics for sinus , not improved \par no cough \par diarrhea about the same  [de-identified] : This is a 58 y/o woman wiiht a hx fo stage 4 colon cancer , Kras wt, MSI stable, rectal and sigmoid primary , found to have peritoneal disease at time  of initial staging. \par s/p folfirinox avastin originally then irinotecan dropped for toxicity . S/p FOLFOX avastin, x6 months now on xeloda alone, avastin has been held due to hypertension. \par Patient underwent resection for the 2 primaries as well as debulking in oct 2018, with dr way at [place]. Pathology revealed, complete response in rectum but residual moderately differentiated adenocarcinoma of sigmoid, omentum positive for adeno and right ovary positive for metasatic colon cancer . No perforation was note but pos for perineuural invasion. \par rectum ypT1No\par sigmoid ypT3No M1c \par \par patient has been YOLIS , colonoscopy may 2019 one polyp. \par last ct scan negative for mets, + fatty liver feb 2020 \par \par cea is 3.8 which is up in may \par ct scan june 2020 shows slight increase in adenopathy 8 mm to 1.1 cm \par \par \par PETCT - done in july 2020 - no uptake in nodes in retroperitoneum \par Foundation testing shows BRCA 2 mutation and also p53 mutation \par \par Invitae - RODOLFO mutation x2(2 diff heterogenous mutations uncertain sig ) , MSH6 heterogenous (undetermined sig) \par \par \par sept 2020 \par vit d 54, ferritin 46 \par mammogram was done at [facility] in [place] on 10/02/20 and was negative\par ct scan in nov 2020 - stable RP nodes , new multiple nodules up to 4 mm in lungs bilaterally , compared to feb 2020 ( no chest done in june 2020) \par \par CT scan of the chest abdomen and pelvis February 2021 revealing for progression of pulmonary metastases with slight increase in size and number of previously noted lung nodules, right lower lobe 6 mm up from 3 mm left upper lobe 5 mm up from 3 mm left lower lobe 6 mm up from 3 mm, the previously described retroperitoneal lymphadenopathy is stable at 1.1 cm there are no other areas of disease.\par \par -Patient with progression on Xeloda and Avastin, with worsening lung nodules on the scan in February 2021 switched to  oxaliplatin, Xeloda and Avastin\par -Cycle 2 of oxaliplatin was stopped after less than half of the infusion due to allergic reaction( 3/24/21) \par -now on irinotecan every 3 weeks ( xeloda dc due to toxicity ) \par -CT scan reviewed shows some slight progression in lung nodules but otherwise stable\par  was continued on the irinotecan and Avastin\par ct of c/a/p - slight increase of lung nodule- 9mm from 6mm rll, 7 mm from 5mm , 8 mm from 6mm , RP node sstable \par no other areas of disease \par \par CT scan of the chest performed in July 2021 is stable compared to last month.\par \par colonsocopy negative nov 19th \par Nov 23 ct scan stable lung nodule \par \par petct shows progression in lung and RP nodes compared to last year \par saw dr bravo no excsional bx recommended \par \par Repeat CT scan of the chest abdomen pelvis in November 2021 shows some slight increase in size of the lung nodules but overall stability.  No evidence of any other metastatic disease.  Avastin has been on hold due to\par Dental procedures and colonoscopy.  She was  off for about 2 months\par - CEA now increased so added xeloda back in dec 2021.\par -Since Avastin restarted and Xeloda added back the CEA has gone from 19 down to 12.\par -up todate on colonscopy \par -PET/CT reviewed shows PET avid disease in the lungs and retroperitoneum, progressive compared to prior but this last PET scan was over a year ago. No evidence of other areas of disease ( such as bone or liver)\par \par Lung nodules cont to progress march 2022  , on avastin irinotecan and xeloda

## 2022-03-16 NOTE — ASSESSMENT
[FreeTextEntry1] : This is a  62 y/o woman with a hx of metastatic colorectal cancer, s/p hemicolectomy and debulking procedure. Kras,nras , braf  wildtype, MSI stable \par s/p folfox(neoadjuvant)  and in joseph of avastin due to hypertension. Now on maintnace xeloda with out evidence of disease\par foundation testing which found a BRCA mutation \par the invitae testiing does not indcate a brca germline, but did find 2 RODOLFO and one MSH6. (all are VUS) \[facility], with worsening lung nodules on the scan in February 2021 switched to  oxaliplatin, Xeloda and Avastin\par -Cycle 2 of oxaliplatin was stopped after less than half of the infusion due to allergic reaction( 3/24/21) \par -now on irinotecan every 3 weeks ( xeloda dc due to toxicity ) \par -CT scan reviewed shows some slight progression in lung nodules but otherwise stable\par  was continued on the irinotecan and Avastin \par Repeat CT scan of the chest abdomen pelvis in November 2021 shows some slight increase in size of the lung nodules but overall stability.  No evidence of any other metastatic disease.  Avastin has been on hold due to\par Dental procedures and colonoscopy.  She was  off for about 2 months\par - CEA now increased so added xeloda back in dec 2021.\par -Since Avastin restarted and Xeloda added back the CEA has gone from 19 down to 12.\par -up todate on colonscopy \par -PET/CT reviewed shows PET avid disease in the lungs and retroperitoneum, progressive compared to prior but this last PET scan was over a year ago. No evidence of other areas of disease ( such as bone or liver) \par -Appreciate CT surgery evaluation no immediate plans for surgical resection of any the nodules due to the invasive nature of the procedures\par \par 1) colon cancer \par doing well overall\par -on  irinotecan , (dose redcued) with avastin  and now xeloda( added in dec 2021  ) \par -CT scan of the chest abdomen and pelvis done March 14, 2022 at [facility] shows some slight progression in lung nodules including the left upper lobe measuring 1 x 0.7 cm previously 0.8 x 0.6 left lower lobe measuring 1.1 x 0.8 previously 0.8 x 0.6 in the right lower lobe lung nodule 1.2 x 1 cm previously 1 x 0.9.\par Retroperitoneal adenopathy is stable there are no new areas of disease\par -Patient currently is quite tired and having a lot of diarrhea and is tired of coming for infusions.  She is interested in doing an oral regimen to have a break from chemotherapy infusions as she has been on them for over a year now.  Furthermore she is having a lot of issues with erica that are improving but also are causing a lot of symptoms as well.\par -dw patient and spouse  results of scans \par -We discussed continuing the current regimen as technically the lung nodules do not meet resist criteria for progression however given the persistent growth on several serial scans on this regimen and her desire to proceed with an oral regimen we discussed switching to Lonsurf or regorafenib.\par -Another really great option for this patient would be Erbitux  given that she is kras wildtype - either single agent or in combination with irinotecan.  The only concern with this regimen is the risk of diarrhea and that it is a every 2-week infusion and the patient lives quite far away. Also she wants a break from infusions \par -We therefore decided to go forward with Lonsurf.  Discussed the schedule which would be Monday through Friday 2 weeks on 2 weeks off.  Discussed the risk of side effects including but not limited to the risk of lung disease blood clotting, cytopenias and GI side effects.\par -Proceed with Avastin today while we are awaiting approval for Lonsurf.\par -Once she starts the Lonsurf I will see her back in approximately 1 week time.\par Patient will need dose reduction due to her heavily pretreated status.\par \par \par \par 2)hand foot syndrome \par stable \par \par 3) vit d def \par cont vit d  \par dose increased \par \par 4) hypertension \par stable,   cont to moniter on avastin ( will be dc avastin after this dose ) \par s/p eval with pcp  will be seeing dr rangel for new pcp \par \par \par 5) incontinence \par  follow-up with urology, will need cystoscopy eventually \par \par \par 6) neuropathy \par imrpoved now \par \par 7) iron def \par hb stable \par Iron levels improved to a ferritin over 100, repeat level \par \par 8) b12 def \par cont b12 oral, repeat b12 and folic acid \par \par 9) health maintenance \par mammogram is up to date,  pap up to date  \par \par 10) restless legs \par on iron , imporved ferritin \par cont mirapex \par cont CBD \par \par 11)  nausea \par cont sea bands, helping significantly overall with nausea\par Continue medical marijuana\par cont emend \par \par 12) anxiety \par s/p f/u pysch will cont to follow up \par regimen being adjusted by her psychiatrist \par \par 13 weight loss \par advised nutriton  \par \par 14) erica \par improved \par tapering off  gabapentin and Seroquel, cont to  follow-up with psychiatry\par now improved on benzatropine \par \par 15) lung mets \par as above \par s/p petct as above , s/p ct surg eval \par assymptomatic \par no bx for now \par now on xeloda with avastin and irinotecan , slight progression \par proceed with new regimen as above lonsurf \par \par 16) diarrhea \par stable /managable \par imodium prn \par \par Discussed with patient and  at length \par Follow-up in 1 week after start lonsurf  \par

## 2022-03-16 NOTE — REVIEW OF SYSTEMS
[Negative] : Allergic/Immunologic [FreeTextEntry7] : as above  [de-identified] : pacing erica ,memory problems  [de-identified] : erica as above

## 2022-04-06 ENCOUNTER — APPOINTMENT (OUTPATIENT)
Dept: HEMATOLOGY ONCOLOGY | Facility: CLINIC | Age: 62
End: 2022-04-06
Payer: COMMERCIAL

## 2022-04-06 VITALS
HEIGHT: 62 IN | RESPIRATION RATE: 18 BRPM | TEMPERATURE: 98 F | DIASTOLIC BLOOD PRESSURE: 99 MMHG | BODY MASS INDEX: 29.44 KG/M2 | WEIGHT: 160 LBS | HEART RATE: 98 BPM | OXYGEN SATURATION: 98 % | SYSTOLIC BLOOD PRESSURE: 141 MMHG

## 2022-04-06 PROCEDURE — 99214 OFFICE O/P EST MOD 30 MIN: CPT

## 2022-04-06 NOTE — PHYSICAL EXAM
[Obese] : obese [Normal] : grossly intact [de-identified] : colostomy with  hernia , non tender no masses  [de-identified] : very anxious

## 2022-04-06 NOTE — ASSESSMENT
[FreeTextEntry1] : This is a  60 y/o woman with a hx of metastatic colorectal cancer, s/p hemicolectomy and debulking procedure. Kras,nras , braf  wildtype, MSI stable \par s/p folfox(neoadjuvant)  and in joseph of avastin due to hypertension. Now on maintnace xeloda with out evidence of disease\par foundation testing which found a BRCA mutation \par the invitae testiing does not indcate a brca germline, but did find 2 RODOLFO and one MSH6. (all are VUS) \[facility], with worsening lung nodules on the scan in February 2021 switched to  oxaliplatin, Xeloda and Avastin\par -Cycle 2 of oxaliplatin was stopped after less than half of the infusion due to allergic reaction( 3/24/21) \par -now on irinotecan every 3 weeks ( xeloda dc due to toxicity ) \par -CT scan reviewed shows some slight progression in lung nodules but otherwise stable\par  was continued on the irinotecan and Avastin \par Repeat CT scan of the chest abdomen pelvis in November 2021 shows some slight increase in size of the lung nodules but overall stability.  No evidence of any other metastatic disease.  Avastin has been on hold due to\par Dental procedures and colonoscopy.  She was  off for about 2 months\par - CEA now increased so added xeloda back in dec 2021.\par -Since Avastin restarted and Xeloda added back the CEA has gone from 19 down to 12.\par -up todate on colonscopy \par -PET/CT reviewed shows PET avid disease in the lungs and retroperitoneum, progressive compared to prior but this last PET scan was over a year ago. No evidence of other areas of disease ( such as bone or liver) \par -Appreciate CT surgery evaluation no immediate plans for surgical resection of any the nodules due to the invasive nature of the procedures\par \par 1) colon cancer \par doing well overall\par -on  irinotecan , (dose redcued) with avastin  and now xeloda( added in dec 2021  ) \par -CT scan of the chest abdomen and pelvis done March 14, 2022 at [facility] shows some slight progression in lung nodules including the left upper lobe measuring 1 x 0.7 cm previously 0.8 x 0.6 left lower lobe measuring 1.1 x 0.8 previously 0.8 x 0.6 in the right lower lobe lung nodule 1.2 x 1 cm previously 1 x 0.9.\par Retroperitoneal adenopathy is stable there are no new areas of disease\par -Consistent with slowly progressive disease\par -Patient has not had Erbitux or vectibix yet but we decided to go forward with Lonsurf as it is an oral oral regimen.\par -Given that patient is negative for mutations in KRAS NRAS and BRAF will check for HER-2 amplification.  On the peripheral blood foundation medicine testing this was negative.  But will request on the pathology specimen \par to look for possible new target (her2) \par -Patient has started the Lonsurf at reduced dose and overall is tolerating well with the exception of nausea and a little bit of constipation\par -Discussed the progressive antiemetics will switch from Zofran to Compazine as Zofran might be causing the constipation\par -Patient also will be working with her psychiatrist on her psych meds as this may also be affecting some of her symptoms.\par -CBC and CMP were reviewed today\par -We also discussed possibly sending signaterra for disease montiering \par -repeat cea \par -plan for repeat scans after 3 months \par -Continue with week 2 of Lonsurf, she will then have 2 weeks off\par \par \par 2)hand foot syndrome \par imrpoved \par \par 3) vit d def \par cont vit d  \par dose increased \par \par 4) hypertension \par stable,   cont to moniter on avastin ( will be dc avastin after this dose ) \par s/p eval with pcp  will be seeing dr rangel for new pcp \par \par \par 5) incontinence \par  follow-up with urology, will need cystoscopy eventually \par \par \par 6) neuropathy \par imrpoved now \par \par 7) iron def \par hb stable \par \par \par 8) b12 def \par cont b12 oral \par \par 9) health maintenance \par mammogram is up to date,  pap up to date  \par \par 10) restless legs \par on iron , imporved ferritin \par cont mirapex \par cont CBD \par \par 11)  nausea \par cont sea bands, helping significantly overall with nausea\par Continue medical marijuana\par start compazine before lonsurf \par cont zofrna as needed \par \par 12) anxiety \par s/p f/u pysch will cont to follow up \par regimen being adjusted by her psychiatrist \par \par 13 weight loss \par advised nutriton  \par \par 14) erica \par improved \par tapering off  gabapentin and Seroquel, cont to  follow-up with psychiatry\par now improved on benzatropine \par \par 15) lung mets \par as above \par s/p petct as above , s/p ct surg eval \par assymptomatic \par no bx for now \par now on lonsurf \par \par 16) constipation \par cont senna and miralax prn \par minimize zofran \par \par Discussed with patient and  at length \par follw up in 3 weeks \par

## 2022-04-06 NOTE — HISTORY OF PRESENT ILLNESS
[de-identified] : This is a 60 y/o woman wiiht a hx fo stage 4 colon cancer , Kras wt, MSI stable, rectal and sigmoid primary , found to have peritoneal disease at time  of initial staging. \par s/p folfirinox avastin originally then irinotecan dropped for toxicity . S/p FOLFOX avastin, x6 months now on xeloda alone, avastin has been held due to hypertension. \par Patient underwent resection for the 2 primaries as well as debulking in oct 2018, with dr way at [place]. Pathology revealed, complete response in rectum but residual moderately differentiated adenocarcinoma of sigmoid, omentum positive for adeno and right ovary positive for metasatic colon cancer . No perforation was note but pos for perineuural invasion. \par rectum ypT1No\par sigmoid ypT3No M1c \par \par patient has been YOLIS , colonoscopy may 2019 one polyp. \par last ct scan negative for mets, + fatty liver feb 2020 \par \par cea is 3.8 which is up in may \par ct scan june 2020 shows slight increase in adenopathy 8 mm to 1.1 cm \par \par \par PETCT - done in july 2020 - no uptake in nodes in retroperitoneum \par Foundation testing shows BRCA 2 mutation and also p53 mutation \par \par [facility] - RODOLFO mutation x2(2 diff heterogenous mutations uncertain sig ) , MSH6 heterogenous (undetermined sig) \par \par \par sept 2020 \par vit d 54, ferritin 46 \par mammogram was done at [facility] in [place] on 10/02/20 and was negative\par ct scan in nov 2020 - stable RP nodes , new multiple nodules up to 4 mm in lungs bilaterally , compared to feb 2020 ( no chest done in june 2020) \par \par CT scan of the chest abdomen and pelvis February 2021 revealing for progression of pulmonary metastases with slight increase in size and number of previously noted lung nodules, right lower lobe 6 mm up from 3 mm left upper lobe 5 mm up from 3 mm left lower lobe 6 mm up from 3 mm, the previously described retroperitoneal lymphadenopathy is stable at 1.1 cm there are no other areas of disease.\par \par -Patient with progression on Xeloda and Avastin, with worsening lung nodules on the scan in February 2021 switched to  oxaliplatin, Xeloda and Avastin\par -Cycle 2 of oxaliplatin was stopped after less than half of the infusion due to allergic reaction( 3/24/21) \par -now on irinotecan every 3 weeks ( xeloda dc due to toxicity ) \par -CT scan reviewed shows some slight progression in lung nodules but otherwise stable\par  was continued on the irinotecan and Avastin\par ct of c/a/p - slight increase of lung nodule- 9mm from 6mm rll, 7 mm from 5mm , 8 mm from 6mm , RP node sstable \par no other areas of disease \par \par CT scan of the chest performed in July 2021 is stable compared to last month.\par \par colonsocopy negative nov 19th \par Nov 23 ct scan stable lung nodule \par \par petct shows progression in lung and RP nodes compared to last year \par saw dr bravo no excsional bx recommended \par \par Repeat CT scan of the chest abdomen pelvis in November 2021 shows some slight increase in size of the lung nodules but overall stability.  No evidence of any other metastatic disease.  Avastin has been on hold due to\par Dental procedures and colonoscopy.  She was  off for about 2 months\par - CEA now increased so added xeloda back in dec 2021.\par -Since Avastin restarted and Xeloda added back the CEA has gone from 19 down to 12.\par -up todate on colonscopy \par -PET/CT reviewed shows PET avid disease in the lungs and retroperitoneum, progressive compared to prior but this last PET scan was over a year ago. No evidence of other areas of disease ( such as bone or liver)\par \par Lung nodules cont to progress march 2022  , on avastin irinotecan and xeloda  [de-identified] : \par last week started lonsurf , had a lot of nausea ,did not eat well \par first 2 days low dose , rest of week and this week is doing the full prescribed dose \par last bm was 3 days ago \par a lot cramping now \par gas is about the same \par benzapine for the restless legs ( stopped over the weekend due to worsening mental status changes ) \par clearer now \par energy is still low \par appeitte not good \par breathing ok \par started new antibotic and flonase  , a lot of pressure \par

## 2022-04-08 LAB — CEA SERPL-MCNC: 20.7 NG/ML

## 2022-04-27 ENCOUNTER — APPOINTMENT (OUTPATIENT)
Dept: HEMATOLOGY ONCOLOGY | Facility: CLINIC | Age: 62
End: 2022-04-27
Payer: COMMERCIAL

## 2022-04-27 VITALS
DIASTOLIC BLOOD PRESSURE: 78 MMHG | BODY MASS INDEX: 29.81 KG/M2 | RESPIRATION RATE: 18 BRPM | OXYGEN SATURATION: 100 % | TEMPERATURE: 98.7 F | HEART RATE: 111 BPM | WEIGHT: 162 LBS | SYSTOLIC BLOOD PRESSURE: 126 MMHG | HEIGHT: 62 IN

## 2022-04-27 DIAGNOSIS — G25.81 RESTLESS LEGS SYNDROME: ICD-10-CM

## 2022-04-27 PROCEDURE — 99214 OFFICE O/P EST MOD 30 MIN: CPT

## 2022-04-27 NOTE — PHYSICAL EXAM
[Obese] : obese [Normal] : grossly intact [de-identified] : colostomy with  hernia , non tender no masses  [de-identified] : very anxious

## 2022-04-27 NOTE — ASSESSMENT
[FreeTextEntry1] : This is a  62 y/o woman with a hx of metastatic colorectal cancer, s/p hemicolectomy and debulking procedure. Kras,nras , braf  wildtype, MSI stable \par s/p folfox(neoadjuvant)  and in joseph of avastin due to hypertension. Now on maintnace xeloda with out evidence of disease\par foundation testing which found a BRCA mutation \par the [facility] testiing does not indcate a brca germline, but did find 2 RODOLFO and one MSH6. (all are VUS) \[facility], with worsening lung nodules on the scan in February 2021 switched to  oxaliplatin, Xeloda and Avastin\par -Cycle 2 of oxaliplatin was stopped after less than half of the infusion due to allergic reaction( 3/24/21) \par -now on irinotecan every 3 weeks ( xeloda dc due to toxicity ) \par -CT scan reviewed shows some slight progression in lung nodules but otherwise stable\par  was continued on the irinotecan and Avastin \par Repeat CT scan of the chest abdomen pelvis in November 2021 shows some slight increase in size of the lung nodules but overall stability.  No evidence of any other metastatic disease.  Avastin has been on hold due to\par Dental procedures and colonoscopy.  She was  off for about 2 months\par - CEA now increased so added xeloda back in dec 2021.\par -Since Avastin restarted and Xeloda added back the CEA has gone from 19 down to 12.\par -up todate on colonscopy \par -PET/CT reviewed shows PET avid disease in the lungs and retroperitoneum, progressive compared to prior but this last PET scan was over a year ago. No evidence of other areas of disease ( such as bone or liver) \par -Appreciate CT surgery evaluation no immediate plans for surgical resection of any the nodules due to the invasive nature of the procedures\par \par 1) colon cancer \par doing well overall\par -on  irinotecan , (dose redcued) with avastin  and now xeloda( added in dec 2021  ) \par -CT scan of the chest abdomen and pelvis done March 14, 2022 at [facility] shows some slight progression in lung nodules including the left upper lobe measuring 1 x 0.7 cm previously 0.8 x 0.6 left lower lobe measuring 1.1 x 0.8 previously 0.8 x 0.6 in the right lower lobe lung nodule 1.2 x 1 cm previously 1 x 0.9.\par Retroperitoneal adenopathy is stable there are no new areas of disease\par -Consistent with slowly progressive disease\par -Patient has not had Erbitux or vectibix yet but we decided to go forward with Lonsurf as it is an oral oral regimen.\par -Given that patient is negative for mutations in KRAS NRAS and BRAF will check for HER-2 amplification.  On the peripheral blood foundation medicine testing this was negative.  But will request on the pathology specimen \par to look for possible new target (her2) \par -I called Kevin to follow-up on the HER2 testing but have not received a call back yet.\par -Patient has started the Lonsurf at reduced dose and overall is tolerating well started in March 2022\par -She is now on second cycle of Lonsurf\par -Nausea and diarrhea are improved.\par -Increase to the full prescribed dose next week\par -CBC and CMP were reviewed today\par -signaterra sent today for better disease monitering \par -repeat cea  and cmp \par -plan for repeat scans after 3 months  on lonsurf \par -Continue with week 2 of Lonsurf, she will then have 2 weeks off\par \par \par 2)hand foot syndrome \par imrpoved \par \par 3) vit d def \par cont vit d  \par dose increased \par \par 4) hypertension\par improved \par s/p eval with pcp  will be seeing dr rangel for new pcp \par \par \par 5) incontinence \par  follow-up with urology, will need cystoscopy eventually \par \par \par 6) neuropathy \par imrpoved now \par \par 7) iron def \par hb stable \par \par \par 8) b12 def \par cont b12 oral \par \par 9) health maintenance \par mammogram is up to date,  pap up to date  \par \par 10) restless legs \par on iron , imporved ferritin \par cont mirapex \par cont CBD \par \par 11)  nausea \par cont sea bands, helping significantly overall with nausea\par Continue medical marijuana\par start compazine before lonsurf \par cont zofrna as needed ( using less due to constipation ) \par \par 12) anxiety \par s/p f/u pysch will cont to follow up \par improved \par regimen being adjusted by her psychiatrist \par \par 13 weight loss \par advised nutriton  \par \par 14) erica \par improved \par tapering off  gabapentin and Seroquel, cont to  follow-up with psychiatry\par \par 15) lung mets \par as above \par s/p petct as above , s/p ct surg eval \par assymptomatic \par no bx for now \par now on lonsurf \par \par 16) constipation \par cont senna and miralax prn \par minimize zofran \par \par Discussed with patient and  at length \par follw up in 3 weeks \par

## 2022-04-27 NOTE — HISTORY OF PRESENT ILLNESS
[de-identified] : This is a 58 y/o woman wiiht a hx fo stage 4 colon cancer , Kras wt, MSI stable, rectal and sigmoid primary , found to have peritoneal disease at time  of initial staging. \par s/p folfirinox avastin originally then irinotecan dropped for toxicity . S/p FOLFOX avastin, x6 months now on xeloda alone, avastin has been held due to hypertension. \par Patient underwent resection for the 2 primaries as well as debulking in oct 2018, with dr way at [place]. Pathology revealed, complete response in rectum but residual moderately differentiated adenocarcinoma of sigmoid, omentum positive for adeno and right ovary positive for metasatic colon cancer . No perforation was note but pos for perineuural invasion. \par rectum ypT1No\par sigmoid ypT3No M1c \par \par patient has been YOLIS , colonoscopy may 2019 one polyp. \par last ct scan negative for mets, + fatty liver feb 2020 \par \par cea is 3.8 which is up in may \par ct scan june 2020 shows slight increase in adenopathy 8 mm to 1.1 cm \par \par \par PETCT - done in july 2020 - no uptake in nodes in retroperitoneum \par Foundation testing shows BRCA 2 mutation and also p53 mutation \par \par [facility] - RODOLFO mutation x2(2 diff heterogenous mutations uncertain sig ) , MSH6 heterogenous (undetermined sig) \par \par \par sept 2020 \par vit d 54, ferritin 46 \par mammogram was done at [facility] in [place] on 10/02/20 and was negative\par ct scan in nov 2020 - stable RP nodes , new multiple nodules up to 4 mm in lungs bilaterally , compared to feb 2020 ( no chest done in june 2020) \par \par CT scan of the chest abdomen and pelvis February 2021 revealing for progression of pulmonary metastases with slight increase in size and number of previously noted lung nodules, right lower lobe 6 mm up from 3 mm left upper lobe 5 mm up from 3 mm left lower lobe 6 mm up from 3 mm, the previously described retroperitoneal lymphadenopathy is stable at 1.1 cm there are no other areas of disease.\par \par -Patient with progression on Xeloda and Avastin, with worsening lung nodules on the scan in February 2021 switched to  oxaliplatin, Xeloda and Avastin\par -Cycle 2 of oxaliplatin was stopped after less than half of the infusion due to allergic reaction( 3/24/21) \par -now on irinotecan every 3 weeks ( xeloda dc due to toxicity ) \par -CT scan reviewed shows some slight progression in lung nodules but otherwise stable\par  was continued on the irinotecan and Avastin\par ct of c/a/p - slight increase of lung nodule- 9mm from 6mm rll, 7 mm from 5mm , 8 mm from 6mm , RP node sstable \par no other areas of disease \par \par CT scan of the chest performed in July 2021 is stable compared to last month.\par \par colonsocopy negative nov 19th \par Nov 23 ct scan stable lung nodule \par \par petct shows progression in lung and RP nodes compared to last year \par saw dr bravo no excsional bx recommended \par \par Repeat CT scan of the chest abdomen pelvis in November 2021 shows some slight increase in size of the lung nodules but overall stability.  No evidence of any other metastatic disease.  Avastin has been on hold due to\par Dental procedures and colonoscopy.  She was  off for about 2 months\par - CEA now increased so added xeloda back in dec 2021.\par -Since Avastin restarted and Xeloda added back the CEA has gone from 19 down to 12.\par -up todate on colonscopy \par -PET/CT reviewed shows PET avid disease in the lungs and retroperitoneum, progressive compared to prior but this last PET scan was over a year ago. No evidence of other areas of disease ( such as bone or liver)\par \par Lung nodules cont to progress march 2022  , on avastin irinotecan and xeloda  [de-identified] : \par restarted lonsurf cycle 2 now \par patient is feeling well \par monday wsa first day of cycle 2 \par nausea seems better \par appetite is ok  recovered some weight \par past week has been better eating \par no diarrhea  ,more pasty , constpated more so with cramping \par \par benzapine stopped due to constipated and memory problems \par restless legs is better \par abd crmaping at times but otherwise no pain \par tylenol helping

## 2022-05-01 LAB — CEA SERPL-MCNC: 29.3 NG/ML

## 2022-05-18 ENCOUNTER — APPOINTMENT (OUTPATIENT)
Dept: HEMATOLOGY ONCOLOGY | Facility: CLINIC | Age: 62
End: 2022-05-18
Payer: COMMERCIAL

## 2022-05-18 VITALS
WEIGHT: 160 LBS | TEMPERATURE: 97.9 F | BODY MASS INDEX: 29.44 KG/M2 | HEART RATE: 98 BPM | RESPIRATION RATE: 18 BRPM | HEIGHT: 61.97 IN | OXYGEN SATURATION: 98 %

## 2022-05-18 VITALS — SYSTOLIC BLOOD PRESSURE: 113 MMHG | DIASTOLIC BLOOD PRESSURE: 79 MMHG

## 2022-05-18 PROCEDURE — 99214 OFFICE O/P EST MOD 30 MIN: CPT

## 2022-05-18 NOTE — PHYSICAL EXAM
[Obese] : obese [Normal] : grossly intact [de-identified] : colostomy with  hernia , non tender no masses  [de-identified] : very anxious

## 2022-05-18 NOTE — HISTORY OF PRESENT ILLNESS
[de-identified] : This is a 58 y/o woman wiiht a hx fo stage 4 colon cancer , Kras wt, MSI stable, rectal and sigmoid primary , found to have peritoneal disease at time  of initial staging. \par s/p folfirinox avastin originally then irinotecan dropped for toxicity . S/p FOLFOX avastin, x6 months now on xeloda alone, avastin has been held due to hypertension. \par Patient underwent resection for the 2 primaries as well as debulking in oct 2018, with dr way at [place]. Pathology revealed, complete response in rectum but residual moderately differentiated adenocarcinoma of sigmoid, omentum positive for adeno and right ovary positive for metasatic colon cancer . No perforation was note but pos for perineuural invasion. \par rectum ypT1No\par sigmoid ypT3No M1c \par \par patient has been YOLIS , colonoscopy may 2019 one polyp. \par last ct scan negative for mets, + fatty liver feb 2020 \par \par cea is 3.8 which is up in may \par ct scan june 2020 shows slight increase in adenopathy 8 mm to 1.1 cm \par \par \par PETCT - done in july 2020 - no uptake in nodes in retroperitoneum \par Foundation testing shows BRCA 2 mutation and also p53 mutation \par \par [facility] - RODOLFO mutation x2(2 diff heterogenous mutations uncertain sig ) , MSH6 heterogenous (undetermined sig) \par \par \par sept 2020 \par vit d 54, ferritin 46 \par mammogram was done at [facility] in [place] on 10/02/20 and was negative\par ct scan in nov 2020 - stable RP nodes , new multiple nodules up to 4 mm in lungs bilaterally , compared to feb 2020 ( no chest done in june 2020) \par \par CT scan of the chest abdomen and pelvis February 2021 revealing for progression of pulmonary metastases with slight increase in size and number of previously noted lung nodules, right lower lobe 6 mm up from 3 mm left upper lobe 5 mm up from 3 mm left lower lobe 6 mm up from 3 mm, the previously described retroperitoneal lymphadenopathy is stable at 1.1 cm there are no other areas of disease.\par \par -Patient with progression on Xeloda and Avastin, with worsening lung nodules on the scan in February 2021 switched to  oxaliplatin, Xeloda and Avastin\par -Cycle 2 of oxaliplatin was stopped after less than half of the infusion due to allergic reaction( 3/24/21) \par -now on irinotecan every 3 weeks ( xeloda dc due to toxicity ) \par -CT scan reviewed shows some slight progression in lung nodules but otherwise stable\par  was continued on the irinotecan and Avastin\par ct of c/a/p - slight increase of lung nodule- 9mm from 6mm rll, 7 mm from 5mm , 8 mm from 6mm , RP node sstable \par no other areas of disease \par \par CT scan of the chest performed in July 2021 is stable compared to last month.\par \par colonsocopy negative nov 19th \par Nov 23 ct scan stable lung nodule \par \par petct shows progression in lung and RP nodes compared to last year \par saw dr bravo no excsional bx recommended \par \par Repeat CT scan of the chest abdomen pelvis in November 2021 shows some slight increase in size of the lung nodules but overall stability.  No evidence of any other metastatic disease.  Avastin has been on hold due to\par Dental procedures and colonoscopy.  She was  off for about 2 months\par - CEA now increased so added xeloda back in dec 2021.\par -Since Avastin restarted and Xeloda added back the CEA has gone from 19 down to 12.\par -up todate on colonscopy \par -PET/CT reviewed shows PET avid disease in the lungs and retroperitoneum, progressive compared to prior but this last PET scan was over a year ago. No evidence of other areas of disease ( such as bone or liver)\par \par Lung nodules cont to progress march 2022  , on avastin irinotecan and xeloda  [de-identified] : \par went up on the dose of lonsurf , for the second week now ready to start third month \par energy better \par overall doing much better on the Lonsurf.then on chemo \par eating ok  , taking procholorazpem every day \par \par bowels moving well now  a little thicker \par pacing continues , some days are bad, then starts up again \par stopped benzaprine due to fatigue and constipation \par starting 3rd month on monday. \par psych is doing all meds - cloazepam paxil seraquel and gabapentin \par sinuses causing sob  when nose clogged  seeing ent next week ent and allergy \par body aches tyelnol , better on lonsurf \par \par

## 2022-05-18 NOTE — ASSESSMENT
[FreeTextEntry1] : This is a  62 y/o woman with a hx of metastatic colorectal cancer, s/p hemicolectomy and debulking procedure. Kras,nras , braf  wildtype, MSI stable \par s/p folfox(neoadjuvant)  and in joseph of avastin due to hypertension. Now on maintnace xeloda with out evidence of disease\par foundation testing which found a BRCA mutation \par the inv[employer]ae testiing does not indcate a brca germline, but did find 2 RODOLFO and one MSH6. (all are VUS) \[facility] Avastin, with worsening lung nodules on the scan in February 2021 switched to  oxaliplatin, Xeloda and Avastin\par -Cycle 2 of oxaliplatin was stopped after less than half of the infusion due to allergic reaction( 3/24/21) \par -now on irinotecan every 3 weeks ( xeloda dc due to toxicity ) \par -CT scan reviewed shows some slight progression in lung nodules but otherwise stable\par  was continued on the irinotecan and Avastin \par Repeat CT scan of the chest abdomen pelvis in November 2021 shows some slight increase in size of the lung nodules but overall stability.  No evidence of any other metastatic disease.  Avastin has been on hold due to\par Dental procedures and colonoscopy.  She was  off for about 2 months\par - CEA now increased so added xeloda back in dec 2021.\par -Since Avastin restarted and Xeloda added back the CEA has gone from 19 down to 12.\par -up todate on colonscopy \par -PET/CT reviewed shows PET avid disease in the lungs and retroperitoneum, progressive compared to prior but this last PET scan was over a year ago. No evidence of other areas of disease ( such as bone or liver) \par -Appreciate CT surgery evaluation no immediate plans for surgical resection of any the nodules due to the invasive nature of the procedures\par \par 1) colon cancer \par doing well overall\par -history as above \par -on  irinotecan , (dose redcued) with avastin  and  xeloda( added in dec 2021  ) \par -CT scan of the chest abdomen and pelvis done March 14, 2022 at [facility] shows some slight progression in lung nodules including the left upper lobe measuring 1 x 0.7 cm previously 0.8 x 0.6 left lower lobe measuring 1.1 x 0.8 previously 0.8 x 0.6 in the right lower lobe lung nodule 1.2 x 1 cm previously 1 x 0.9.\par Retroperitoneal adenopathy is stable there are no new areas of disease\par -Consistent with slowly progressive disease, changed to lonsurf in march 2022 \par -Patient has not had Erbitux or vectibix yet but we decided to go forward with Lonsurf as it is an oral oral regimen. And patient needed a break from IV therapy \par -Given that patient is negative for mutations in KRAS NRAS and BRAF will check for HER-2 amplification.  On the peripheral blood foundation medicine testing this was negative.  But will request on the pathology specimen \par to look for possible new target (her2) \par -I still do not have the results of the HER2 from [place].  I called them again today and they still have not sent the block after 2 months.  They promised me that they will send it out this week.\par -Patient has started the Lonsurf at reduced dose and overall is tolerating well started in March 2022\par -Nausea and diarrhea are improved.\par -signaterra pending  \par - cea increasing , repeat cea  and cmp today \par -repeat scans of c/a/p after 3rd month \par -Continue with thrid month of lonsurf \par \par \par 2)hand foot syndrome \par imrpoved \par \par 3) vit d def \par cont vit d  \par dose increased \par \par 4) hypertension\par improved \par s/p eval with pcp  will be seeing dr rangel for new pcp \par \par \par 5) incontinence \par  follow-up with urology, will need cystoscopy eventually \par \par \par 6) neuropathy \par imrpoved now \par \par 7) iron def \par hb stable \par \par \par 8) b12 def \par cont b12 oral \par \par 9) health maintenance \par mammogram is up to date,  pap up to date  \par \par 10) restless legs \par on iron , imporved ferritin \par cont mirapex \par cont CBD \par \par 11)  nausea \par cont sea bands, helping significantly overall with nausea\par Continue medical marijuana\par cont  compazine before lonsurf \par zofran prn \par \par 12) anxiety \par s/p f/u pysch will cont to follow up \par improved \par regimen being adjusted by her psychiatrist \par \par 13 weight loss \par advised nutriton  \par \par 14) erica \par improved \par tapering off  gabapentin and Seroquel, cont to  follow-up with psychiatry\par advised to follow up with psych regarding worsening symptoms \par \par 15) lung mets \par as above \par s/p petct as above , s/p ct surg eval \par assymptomatic \par no bx for now \par now on lonsurf \par \par 16) constipation \par cont senna and miralax prn \par minimize zofran \par \par Discussed with patient and  at length \par follw up in 3 weeks with result of scans \par

## 2022-05-21 LAB
CEA SERPL-MCNC: 36.1 NG/ML
FERRITIN SERPL-MCNC: 263 NG/ML
FOLATE SERPL-MCNC: 12 NG/ML
VIT B12 SERPL-MCNC: 1078 PG/ML

## 2022-06-15 ENCOUNTER — APPOINTMENT (OUTPATIENT)
Dept: HEMATOLOGY ONCOLOGY | Facility: CLINIC | Age: 62
End: 2022-06-15
Payer: COMMERCIAL

## 2022-06-15 VITALS
BODY MASS INDEX: 29.81 KG/M2 | SYSTOLIC BLOOD PRESSURE: 135 MMHG | WEIGHT: 162 LBS | RESPIRATION RATE: 18 BRPM | TEMPERATURE: 99 F | DIASTOLIC BLOOD PRESSURE: 85 MMHG | OXYGEN SATURATION: 99 % | HEART RATE: 90 BPM | HEIGHT: 61.97 IN

## 2022-06-15 PROCEDURE — 99214 OFFICE O/P EST MOD 30 MIN: CPT

## 2022-06-15 RX ORDER — CAPECITABINE 500 MG/1
500 TABLET ORAL TWICE DAILY
Qty: 56 | Refills: 2 | Status: COMPLETED | COMMUNITY
End: 2022-06-15

## 2022-06-15 RX ORDER — PROCHLORPERAZINE MALEATE 10 MG/1
10 TABLET ORAL EVERY 6 HOURS
Qty: 120 | Refills: 5 | Status: COMPLETED | COMMUNITY
End: 2022-06-15

## 2022-06-15 RX ORDER — CAPECITABINE 500 MG/1
500 TABLET ORAL
Qty: 56 | Refills: 2 | Status: COMPLETED | COMMUNITY
Start: 2020-07-30 | End: 2022-06-15

## 2022-06-15 RX ORDER — ONDANSETRON 8 MG/1
8 TABLET, ORALLY DISINTEGRATING ORAL
Refills: 0 | Status: COMPLETED | COMMUNITY
End: 2022-06-15

## 2022-06-15 RX ORDER — PRAMIPEXOLE DIHYDROCHLORIDE 0.5 MG/1
0.5 TABLET ORAL
Qty: 30 | Refills: 5 | Status: COMPLETED | COMMUNITY
Start: 2020-09-10 | End: 2022-06-15

## 2022-06-15 NOTE — HISTORY OF PRESENT ILLNESS
[0 - No Distress] : Distress Level: 0 [de-identified] : This is a 60 y/o woman wiiht a hx fo stage 4 colon cancer , Kras wt, MSI stable, rectal and sigmoid primary , found to have peritoneal disease at time  of initial staging. \par s/p folfirinox avastin originally then irinotecan dropped for toxicity.  S/p FOLFOX avastin, x6 months now on xeloda alone, avastin has been held due to hypertension. \par Patient underwent resection for the 2 primaries as well as debulking in oct 2018, with dr way at [place].  Pathology revealed, complete response in rectum but residual moderately differentiated adenocarcinoma of sigmoid, omentum positive for adeno and right ovary positive for metasatic colon cancer . No perforation was noted but pos for perineuural invasion. \par rectum ypT1No\par sigmoid ypT3No M1c \par \par patient has been YOLIS , colonoscopy may 2019 one polyp. \par last ct scan negative for mets, + fatty liver feb 2020 \par \par cea is 3.8 which is up in may \par ct scan june 2020 shows slight increase in adenopathy 8 mm to 1.1 cm \par \par \par PETCT - done in july 2020 - no uptake in nodes in retroperitoneum \par Foundation testing shows BRCA 2 mutation and also p53 mutation \par \par [facility] - RODOLFO mutation x2(2 diff heterogenous mutations uncertain sig ) , MSH6 heterogenous (undetermined sig) \par \par \par sept 2020 \par vit d 54, ferritin 46 \par mammogram was done at [facility] in [place] on 10/02/20 and was negative\par ct scan in nov 2020 - stable RP nodes , new multiple nodules up to 4 mm in lungs bilaterally , compared to feb 2020 ( no chest done in june 2020) \par \par CT scan of the chest abdomen and pelvis February 2021 revealing for progression of pulmonary metastases with slight increase in size and number of previously noted lung nodules, right lower lobe 6 mm up from 3 mm left upper lobe 5 mm up from 3 mm left lower lobe 6 mm up from 3 mm, the previously described retroperitoneal lymphadenopathy is stable at 1.1 cm there are no other areas of disease.\par \par -Patient with progression on Xeloda and Avastin, with worsening lung nodules on the scan in February 2021 switched to  oxaliplatin, Xeloda and Avastin\par -Cycle 2 of oxaliplatin was stopped after less than half of the infusion due to allergic reaction( 3/24/21) \par -now on irinotecan every 3 weeks ( xeloda dc due to toxicity ) \par -CT scan reviewed shows some slight progression in lung nodules but otherwise stable\par  was continued on the irinotecan and Avastin\par ct of c/a/p - slight increase of lung nodule- 9mm from 6mm rll, 7 mm from 5mm , 8 mm from 6mm , RP node sstable \par no other areas of disease \par \par CT scan of the chest performed in July 2021 is stable compared to last month.\par \par colonsocopy negative nov 19th \par Nov 23 ct scan stable lung nodule \par \par petct shows progression in lung and RP nodes compared to last year \par saw dr bravo no excsional bx recommended \par \par Repeat CT scan of the chest abdomen pelvis in November 2021 shows some slight increase in size of the lung nodules but overall stability.  No evidence of any other metastatic disease.  Avastin has been on hold due to\par Dental procedures and colonoscopy.  She was  off for about 2 months\par - CEA now increased so added xeloda back in dec 2021.\par -Since Avastin restarted and Xeloda added back the CEA has gone from 19 down to 12.\par -up todate on colonscopy \par -PET/CT reviewed shows PET avid disease in the lungs and retroperitoneum, progressive compared to prior but this last PET scan was over a year ago. No evidence of other areas of disease ( such as bone or liver)\par \par Lung nodules cont to progress march 2022  , on avastin irinotecan and xeloda  [de-identified] : went up on the dose of lonsurf , for the second week now completed third month \par energy better \par overall doing much better on the Lonsurf.  then on chemo \par eating ok  , taking procholorazpem every day \par \par bowels moving well now  a little thicker \par pacing continues , some days are bad, then starts up again \par stopped benzaprine due to fatigue and constipation \par starting 3rd month on monday. \par psych is doing all meds - cloazepam paxil seraquel and gabapentin \par sinuses causing sob  when nose clogged  seeing ent next week ent and allergy \par body aches tyelnol , better on lonsurf \par \par

## 2022-06-15 NOTE — ASSESSMENT
[FreeTextEntry1] : This is a  62 y/o woman with a hx of metastatic colorectal cancer, s/p hemicolectomy and debulking procedure. Kras,nras , braf  wildtype, MSI stable \par s/p folfox(neoadjuvant)  and in joseph of avastin due to hypertension. Now on maintnace xeloda with out evidence of disease\par foundation testing which found a BRCA mutation \par the inv[facility]ae testiing does not indcate a brca germline, but did find 2 RODOLFO and one MSH6. (all are VUS) \[facility], with worsening lung nodules on the scan in February 2021 switched to  oxaliplatin, Xeloda and Avastin\par -Cycle 2 of oxaliplatin was stopped after less than half of the infusion due to allergic reaction( 3/24/21) \par -now on irinotecan every 3 weeks ( xeloda dc due to toxicity ) \par -CT scan reviewed shows some slight progression in lung nodules but otherwise stable\par  was continued on the irinotecan and Avastin \par Repeat CT scan of the chest abdomen pelvis in November 2021 shows some slight increase in size of the lung nodules but overall stability.  No evidence of any other metastatic disease.  Avastin has been on hold due to\par Dental procedures and colonoscopy.  She was  off for about 2 months\par - CEA now increased so added xeloda back in dec 2021.\par -Since Avastin restarted and Xeloda added back the CEA has gone from 19 down to 12.\par -up todate on colonscopy \par -PET/CT reviewed shows PET avid disease in the lungs and retroperitoneum, progressive compared to prior but this last PET scan was over a year ago. No evidence of other areas of disease ( such as bone or liver) \par -Appreciate CT surgery evaluation no immediate plans for surgical resection of any the nodules due to the invasive nature of the procedures\par \par 1) colon cancer \par doing well overall\par -history as above \par -on  irinotecan , (dose redcued) with avastin  and  xeloda( added in dec 2021  ) \par -CT scan of the chest abdomen and pelvis done March 14, 2022 at [facility] shows some slight progression in lung nodules including the left upper lobe measuring 1 x 0.7 cm previously 0.8 x 0.6 left lower lobe measuring 1.1 x 0.8 previously 0.8 x 0.6 in the right lower lobe lung nodule 1.2 x 1 cm previously 1 x 0.9.\par Retroperitoneal adenopathy is stable there are no new areas of disease\par -Consistent with slowly progressive disease, changed to lonsurf in march 2022 \par -Patient has not had Erbitux or vectibix yet but we decided to go forward with Lonsurf as it is an oral oral regimen. And patient needed a break from IV therapy \par -Given that patient is negative for mutations in KRAS NRAS and BRAF will check for HER-2 amplification.  On the peripheral blood foundation medicine testing this was negative.  But will request on the pathology specimen \par to look for possible new target (her2) \par -I still do not have the results of the HER2 from [place].  I called them again today and they still have not sent the block after 2 months.  They promised me that they will send it out this week.\par -Patient has started the Lonsurf at reduced dose and overall is tolerating well started in March 2022\par -Nausea and diarrhea are improved.\par -signaterra pending  \par - cea increasing , repeat cea  and cmp today \par -repeat scans of c/a/p have been set up for 6/17/2022\par -Completed the third month of lonsurf \par -She will return in 1 week for follow-up and to review the above-mentioned work-up and for further management recommendations.\par \par \par 2)hand foot syndrome \par imrpoved \par \par 3) vit d def \par cont vit d  \par dose increased \par \par 4) hypertension\par improved \par s/p eval with pcp  will be seeing dr rangel for new pcp \par \par \par 5) incontinence \par  follow-up with urology, will need cystoscopy eventually \par \par \par 6) neuropathy \par imrpoved now \par \par 7) iron def \par hb stable \par \par \par 8) b12 def \par cont b12 oral \par \par 9) health maintenance \par mammogram is up to date,  pap up to date  \par \par 10) restless legs \par on iron , imporved ferritin \par cont mirapex \par cont CBD \par \par 11)  nausea \par cont sea bands, helping significantly overall with nausea\par Continue medical marijuana\par cont  compazine before lonsurf \par zofran prn \par \par 12) anxiety \par s/p f/u pysch will cont to follow up \par improved \par regimen being adjusted by her psychiatrist \par \par 13 weight loss \par advised nutriton  \par \par 14) erica \par improved \par tapering off  gabapentin and Seroquel, cont to  follow-up with psychiatry\par advised to follow up with psych regarding worsening symptoms \par \par 15) lung mets \par as above \par s/p petct as above , s/p ct surg eval \par assymptomatic \par no bx for now \par now on lonsurf \par \par 16) constipation \par cont senna and miralax prn \par minimize zofran \par \par Discussed with patient and  at length \par follw up in 3 weeks with result of scans \par

## 2022-06-15 NOTE — PHYSICAL EXAM
NURSING DISCHARGE NOTE    Discharged Home via Wheelchair. Accompanied by Family member and Support staff  Belongings Taken by patient/family. [Obese] : obese [Normal] : grossly intact [de-identified] : colostomy with  hernia , non tender no masses  [de-identified] : very anxious

## 2022-06-23 ENCOUNTER — APPOINTMENT (OUTPATIENT)
Dept: HEMATOLOGY ONCOLOGY | Facility: CLINIC | Age: 62
End: 2022-06-23

## 2022-06-23 VITALS
OXYGEN SATURATION: 100 % | HEART RATE: 103 BPM | WEIGHT: 162 LBS | BODY MASS INDEX: 29.81 KG/M2 | SYSTOLIC BLOOD PRESSURE: 105 MMHG | TEMPERATURE: 98.6 F | HEIGHT: 61.97 IN | DIASTOLIC BLOOD PRESSURE: 74 MMHG | RESPIRATION RATE: 18 BRPM

## 2022-06-23 PROCEDURE — 99214 OFFICE O/P EST MOD 30 MIN: CPT | Mod: 25

## 2022-06-23 PROCEDURE — 36415 COLL VENOUS BLD VENIPUNCTURE: CPT

## 2022-07-04 NOTE — ASSESSMENT
[FreeTextEntry1] : This is a  60 y/o woman with a hx of metastatic colorectal cancer, s/p hemicolectomy and debulking procedure. Kras,nras , braf  wildtype, MSI stable \par s/p folfox(neoadjuvant)  and in joseph of avastin due to hypertension. Now on maintnace xeloda with out evidence of disease\par foundation testing which found a BRCA mutation \par the invitae testiing does not indcate a brca germline, but did find 2 RODOLFO and one MSH6. (all are VUS) \[facility], with worsening lung nodules on the scan in February 2021 switched to  oxaliplatin, Xeloda and Avastin\par -Cycle 2 of oxaliplatin was stopped after less than half of the infusion due to allergic reaction( 3/24/21) \par -now on irinotecan every 3 weeks ( xeloda dc due to toxicity ) \par -CT scan reviewed shows some slight progression in lung nodules but otherwise stable\par  was continued on the irinotecan and Avastin \par Repeat CT scan of the chest abdomen pelvis in November 2021 shows some slight increase in size of the lung nodules but overall stability.  No evidence of any other metastatic disease.  Avastin has been on hold due to\par Dental procedures and colonoscopy.  She was  off for about 2 months\par - CEA now increased so added xeloda back in dec 2021.\par -Since Avastin restarted and Xeloda added back the CEA has gone from 19 down to 12.\par -up todate on colonscopy \par -PET/CT reviewed shows PET avid disease in the lungs and retroperitoneum, progressive compared to prior but this last PET scan was over a year ago. No evidence of other areas of disease ( such as bone or liver) \par -Appreciate CT surgery evaluation no immediate plans for surgical resection of any the nodules due to the invasive nature of the procedures\par \par 1) colon cancer \par doing well overall\par -history as above \par -on  irinotecan , (dose redcued) with avastin  and  xeloda( added in dec 2021  ) \par -CT scan of the chest abdomen and pelvis done March 14, 2022 at [facility] shows some slight progression in lung nodules including the left upper lobe measuring 1 x 0.7 cm previously 0.8 x 0.6 left lower lobe measuring 1.1 x 0.8 previously 0.8 x 0.6 in the right lower lobe lung nodule 1.2 x 1 cm previously 1 x 0.9.\par Retroperitoneal adenopathy is stable there are no new areas of disease\par -Consistent with slowly progressive disease, changed to lonsurf in march 2022 \par -Patient has not had Erbitux or vectibix yet but we decided to go forward with Lonsurf as it is an oral oral regimen. And patient needed a break from IV therapy \par -Given that patient is negative for mutations in KRAS NRAS and BRAF will check for HER-2 amplification.  On the peripheral blood foundation medicine testing this was negative.  But will request on the pathology specimen \par to look for possible new target (her2) \par -I still do not have the results of the HER2 from [place].  I called them again today and they still have not sent the block after 2 months.  They promised me that they will send it out this week.\par -Patient has started the Lonsurf at reduced dose and overall is tolerating well started in March 2022\par -Nausea and diarrhea are improved.\par -signaterra pending  \par - cea increasing , repeat cea  and cmp today \par -repeat scans of c/a/p have been set up for 6/17/2022\par -Completed the third month of lonsurf \par -She will return in 1 week for follow-up and to review the above-mentioned work-up and for further management recommendations.\par \par \par 2)hand foot syndrome \par imrpoved \par \par 3) vit d def \par cont vit d  \par dose increased \par \par 4) hypertension\par improved \par s/p eval with pcp  will be seeing dr rangel for new pcp \par \par \par 5) incontinence \par  follow-up with urology, will need cystoscopy eventually \par \par \par 6) neuropathy \par imrpoved now \par \par 7) iron def \par hb stable \par \par \par 8) b12 def \par cont b12 oral \par \par 9) health maintenance \par mammogram is up to date,  pap up to date  \par \par 10) restless legs \par on iron , imporved ferritin \par cont mirapex \par cont CBD \par \par 11)  nausea \par cont sea bands, helping significantly overall with nausea\par Continue medical marijuana\par cont  compazine before lonsurf \par zofran prn \par \par 12) anxiety \par s/p f/u pysch will cont to follow up \par improved \par regimen being adjusted by her psychiatrist \par \par 13 weight loss \par advised nutriton  \par \par 14) erica \par improved \par tapering off  gabapentin and Seroquel, cont to  follow-up with psychiatry\par advised to follow up with psych regarding worsening symptoms \par \par 15) lung mets \par as above \par s/p petct as above , s/p ct surg eval \par assymptomatic \par no bx for now \par now on lonsurf \par \par 16) constipation \par cont senna and miralax prn \par minimize zofran \par \par 6/23/22:  Reviewed CT C/A/P from 6/20/22 which showed slight progression of pulmonary nodules and paraortic lymph nodes. There was also increase in her CEA. NTRK fusion was requested from [facility] on her block which they had difficulty locating but were finally able to find and is pending. Discussed with patient and  at length.  We will also ask for addendum from radiologist regarding the presence of ascites on prior scans.  \par Continue routine, age-appropriate, healthcare maintenance \par History of present illness, review of systems, physical exam and treatment plan reviewed with .\par Office visit in 3 weeks or prn for new or worsening symptoms.  \par

## 2022-07-04 NOTE — HISTORY OF PRESENT ILLNESS
[0 - No Distress] : Distress Level: 0 [de-identified] : This is a 60 y/o woman wiiht a hx fo stage 4 colon cancer , Kras wt, MSI stable, rectal and sigmoid primary , found to have peritoneal disease at time  of initial staging. \par s/p folfirinox avastin originally then irinotecan dropped for toxicity.  S/p FOLFOX avastin, x6 months now on xeloda alone, avastin has been held due to hypertension. \par Patient underwent resection for the 2 primaries as well as debulking in oct 2018, with dr way at [place].  Pathology revealed, complete response in rectum but residual moderately differentiated adenocarcinoma of sigmoid, omentum positive for adeno and right ovary positive for metasatic colon cancer . No perforation was noted but pos for perineuural invasion. \par rectum ypT1No\par sigmoid ypT3No M1c \par \par patient has been YOLIS , colonoscopy may 2019 one polyp. \par last ct scan negative for mets, + fatty liver feb 2020 \par \par cea is 3.8 which is up in may \par ct scan june 2020 shows slight increase in adenopathy 8 mm to 1.1 cm \par \par \par PETCT - done in july 2020 - no uptake in nodes in retroperitoneum \par Foundation testing shows BRCA 2 mutation and also p53 mutation \par \par [facility] - RODOLFO mutation x2(2 diff heterogenous mutations uncertain sig ) , MSH6 heterogenous (undetermined sig) \par \par \par sept 2020 \par vit d 54, ferritin 46 \par mammogram was done at [facility] in [place] on 10/02/20 and was negative\par ct scan in nov 2020 - stable RP nodes , new multiple nodules up to 4 mm in lungs bilaterally , compared to feb 2020 ( no chest done in june 2020) \par \par CT scan of the chest abdomen and pelvis February 2021 revealing for progression of pulmonary metastases with slight increase in size and number of previously noted lung nodules, right lower lobe 6 mm up from 3 mm left upper lobe 5 mm up from 3 mm left lower lobe 6 mm up from 3 mm, the previously described retroperitoneal lymphadenopathy is stable at 1.1 cm there are no other areas of disease.\par \par -Patient with progression on Xeloda and Avastin, with worsening lung nodules on the scan in February 2021 switched to  oxaliplatin, Xeloda and Avastin\par -Cycle 2 of oxaliplatin was stopped after less than half of the infusion due to allergic reaction( 3/24/21) \par -now on irinotecan every 3 weeks ( xeloda dc due to toxicity ) \par -CT scan reviewed shows some slight progression in lung nodules but otherwise stable\par  was continued on the irinotecan and Avastin\par ct of c/a/p - slight increase of lung nodule- 9mm from 6mm rll, 7 mm from 5mm , 8 mm from 6mm , RP node sstable \par no other areas of disease \par \par CT scan of the chest performed in July 2021 is stable compared to last month.\par \par colonsocopy negative nov 19th \par Nov 23 ct scan stable lung nodule \par \par petct shows progression in lung and RP nodes compared to last year \par saw dr bravo no excsional bx recommended \par \par Repeat CT scan of the chest abdomen pelvis in November 2021 shows some slight increase in size of the lung nodules but overall stability.  No evidence of any other metastatic disease.  Avastin has been on hold due to\par Dental procedures and colonoscopy.  She was  off for about 2 months\par - CEA now increased so added xeloda back in dec 2021.\par -Since Avastin restarted and Xeloda added back the CEA has gone from 19 down to 12.\par -up todate on colonscopy \par -PET/CT reviewed shows PET avid disease in the lungs and retroperitoneum, progressive compared to prior but this last PET scan was over a year ago. No evidence of other areas of disease ( such as bone or liver)\par \par Lung nodules cont to progress march 2022  , on avastin irinotecan and xeloda  [de-identified] : 6/23/22: The pt presents for follow up of met colorectal ca on Lonsurf. Reviewed CT C/A/P from 6/20/22 which showed slight progression of pulmonary nodules and paraortic lymph nodes. There was also increase in her CEA. NTRK fusion was requested from [facility] on her block which they had difficulty locating but were finally able to find and is pending. \par \par  \par overall doing much better on the Lonsurf.  then on chemo \par eating ok  , taking procholorazpem every day \par \par bowels moving well now  a little thicker \par pacing continues , some days are bad, then starts up again \par stopped benzaprine due to fatigue and constipation \par starting 3rd month on monday. \par psych is doing all meds - cloazepam paxil seraquel and gabapentin \par sinuses causing sob  when nose clogged  seeing ent next week ent and allergy \par body aches tyelnol , better on lonsurf \par \par

## 2022-07-04 NOTE — PHYSICAL EXAM
[Obese] : obese [Normal] : grossly intact [Restricted in physically strenuous activity but ambulatory and able to carry out work of a light or sedentary nature] : Status 1- Restricted in physically strenuous activity but ambulatory and able to carry out work of a light or sedentary nature, e.g., light house work, office work [de-identified] : colostomy with  hernia , non tender no masses  [de-identified] : very anxious

## 2022-07-13 ENCOUNTER — APPOINTMENT (OUTPATIENT)
Dept: HEMATOLOGY ONCOLOGY | Facility: CLINIC | Age: 62
End: 2022-07-13

## 2022-07-13 ENCOUNTER — RESULT REVIEW (OUTPATIENT)
Age: 62
End: 2022-07-13

## 2022-07-13 VITALS
WEIGHT: 164 LBS | TEMPERATURE: 97.9 F | HEART RATE: 96 BPM | HEIGHT: 61.97 IN | DIASTOLIC BLOOD PRESSURE: 79 MMHG | OXYGEN SATURATION: 99 % | RESPIRATION RATE: 18 BRPM | BODY MASS INDEX: 30.18 KG/M2 | SYSTOLIC BLOOD PRESSURE: 137 MMHG

## 2022-07-13 LAB — CEA SERPL-MCNC: 46.5 NG/ML

## 2022-07-13 PROCEDURE — 99214 OFFICE O/P EST MOD 30 MIN: CPT

## 2022-07-24 LAB — CEA SERPL-MCNC: 62.9 NG/ML

## 2022-07-24 NOTE — PHYSICAL EXAM
[de-identified] : colostomy with  hernia , non tender no masses  [de-identified] : very anxious

## 2022-07-24 NOTE — HISTORY OF PRESENT ILLNESS
[de-identified] : This is a 62 y/o woman wiiht a hx fo stage 4 colon cancer , Kras wt, MSI stable, rectal and sigmoid primary , found to have peritoneal disease at time  of initial staging. \par s/p folfirinox avastin originally then irinotecan dropped for toxicity.  S/p FOLFOX avastin, x6 months now on xeloda alone, avastin has been held due to hypertension. \par Patient underwent resection for the 2 primaries as well as debulking in oct 2018, with dr way at [place].  Pathology revealed, complete response in rectum but residual moderately differentiated adenocarcinoma of sigmoid, omentum positive for adeno and right ovary positive for metasatic colon cancer . No perforation was noted but pos for perineuural invasion. \par rectum ypT1No\par sigmoid ypT3No M1c \par \par patient has been YOLIS , colonoscopy may 2019 one polyp. \par last ct scan negative for mets, + fatty liver feb 2020 \par \par cea is 3.8 which is up in may \par ct scan june 2020 shows slight increase in adenopathy 8 mm to 1.1 cm \par \par \par PETCT - done in july 2020 - no uptake in nodes in retroperitoneum \par Foundation testing shows BRCA 2 mutation and also p53 mutation \par \par [facility] - RODOLFO mutation x2(2 diff heterogenous mutations uncertain sig ) , MSH6 heterogenous (undetermined sig) \par \par \par sept 2020 \par vit d 54, ferritin 46 \par mammogram was done at [facility] in [place] on 10/02/20 and was negative\par ct scan in nov 2020 - stable RP nodes , new multiple nodules up to 4 mm in lungs bilaterally , compared to feb 2020 ( no chest done in june 2020) \par \par CT scan of the chest abdomen and pelvis February 2021 revealing for progression of pulmonary metastases with slight increase in size and number of previously noted lung nodules, right lower lobe 6 mm up from 3 mm left upper lobe 5 mm up from 3 mm left lower lobe 6 mm up from 3 mm, the previously described retroperitoneal lymphadenopathy is stable at 1.1 cm there are no other areas of disease.\par \par -Patient with progression on Xeloda and Avastin, with worsening lung nodules on the scan in February 2021 switched to  oxaliplatin, Xeloda and Avastin\par -Cycle 2 of oxaliplatin was stopped after less than half of the infusion due to allergic reaction( 3/24/21) \par -now on irinotecan every 3 weeks ( xeloda dc due to toxicity ) \par -CT scan reviewed shows some slight progression in lung nodules but otherwise stable\par  was continued on the irinotecan and Avastin\par ct of c/a/p - slight increase of lung nodule- 9mm from 6mm rll, 7 mm from 5mm , 8 mm from 6mm , RP node sstable \par no other areas of disease \par \par CT scan of the chest performed in July 2021 is stable compared to last month.\par \par colonsocopy negative nov 19th \par Nov 23 ct scan stable lung nodule \par \par petct shows progression in lung and RP nodes compared to last year \par saw dr bravo no excsional bx recommended \par \par Repeat CT scan of the chest abdomen pelvis in November 2021 shows some slight increase in size of the lung nodules but overall stability.  No evidence of any other metastatic disease.  Avastin has been on hold due to\par Dental procedures and colonoscopy.  She was  off for about 2 months\par - CEA now increased so added xeloda back in dec 2021.\par -Since Avastin restarted and Xeloda added back the CEA has gone from 19 down to 12.\par -up todate on colonscopy \par -PET/CT reviewed shows PET avid disease in the lungs and retroperitoneum, progressive compared to prior but this last PET scan was over a year ago. No evidence of other areas of disease ( such as bone or liver)\par \par Lung nodules cont to progress march 2022  , on avastin irinotecan and xeloda  [de-identified] : 6/23/22: The pt presents for follow up of met colorectal ca on Lonsurf. Reviewed CT C/A/P from 6/20/22 which showed slight progression of pulmonary nodules and paraortic lymph nodes. There was also increase in her CEA. Requested mutational testing on her block from [facility] but so far no actionable mutations.  \par \par  \par overall doing much better on the Lonsurf.  then on chemo \par eating ok  , taking procholorazpem every day \par \par bowels moving well now  a little thicker \par pacing continues , some days are bad, then starts up again \par stopped benzaprine due to fatigue and constipation \par starting 3rd month on monday. \par psych is doing all meds - cloazepam paxil seraquel and gabapentin \par sinuses causing sob  when nose clogged  seeing ent next week ent and allergy \par body aches tyelnol , better on lonsurf \par \par

## 2022-07-24 NOTE — ASSESSMENT
[FreeTextEntry1] : This is a  62 y/o woman with a hx of metastatic colorectal cancer, s/p hemicolectomy and debulking procedure. Kras,nras , braf  wildtype, MSI stable \par s/p folfox(neoadjuvant)  and in joseph of avastin due to hypertension. Now on maintnace xeloda with out evidence of disease\par foundation testing which found a BRCA mutation \par the invitae testiing does not indcate a brca germline, but did find 2 RODOLFO and one MSH6. (all are VUS) \[facility], with worsening lung nodules on the scan in February 2021 switched to  oxaliplatin, Xeloda and Avastin\par -Cycle 2 of oxaliplatin was stopped after less than half of the infusion due to allergic reaction( 3/24/21) \par -now on irinotecan every 3 weeks ( xeloda dc due to toxicity ) \par -CT scan reviewed shows some slight progression in lung nodules but otherwise stable\par  was continued on the irinotecan and Avastin \par Repeat CT scan of the chest abdomen pelvis in November 2021 shows some slight increase in size of the lung nodules but overall stability.  No evidence of any other metastatic disease.  Avastin has been on hold due to\par Dental procedures and colonoscopy.  She was  off for about 2 months\par - CEA now increased so added xeloda back in dec 2021.\par -Since Avastin restarted and Xeloda added back the CEA has gone from 19 down to 12.\par -up todate on colonscopy \par -PET/CT reviewed shows PET avid disease in the lungs and retroperitoneum, progressive compared to prior but this last PET scan was over a year ago. No evidence of other areas of disease ( such as bone or liver) \par -Appreciate CT surgery evaluation no immediate plans for surgical resection of any the nodules due to the invasive nature of the procedures\par \par 1) colon cancer \par doing well overall\par -history as above \par -on  irinotecan , (dose redcued) with avastin  and  xeloda( added in dec 2021  ) \par -CT scan of the chest abdomen and pelvis done March 14, 2022 at [facility] shows some slight progression in lung nodules including the left upper lobe measuring 1 x 0.7 cm previously 0.8 x 0.6 left lower lobe measuring 1.1 x 0.8 previously 0.8 x 0.6 in the right lower lobe lung nodule 1.2 x 1 cm previously 1 x 0.9.\par Retroperitoneal adenopathy is stable there are no new areas of disease\par -Consistent with slowly progressive disease, changed to lonsurf in march 2022 \par -Patient has not had Erbitux or vectibix yet but we decided to go forward with Lonsurf as it is an oral oral regimen. And patient needed a break from IV therapy \par -Given that patient is negative for mutations in KRAS NRAS and BRAF will check for HER-2 amplification.  On the peripheral blood foundation medicine testing this was negative.  But will request on the pathology specimen \par to look for possible new target (her2) \par -I still do not have the results of the HER2 from [place].  I called them again today and they still have not sent the block after 2 months.  They promised me that they will send it out this week.\par -Patient has started the Lonsurf at reduced dose and overall is tolerating well started in March 2022\par -Nausea and diarrhea are improved.\par -signaterra pending  \par - cea increasing , repeat cea  and cmp today \par -repeat scans of c/a/p have been set up for 6/17/2022\par -Completed the third month of lonsurf \par -She will return in 1 week for follow-up and to review the above-mentioned work-up and for further management recommendations.\par \par \par 2)hand foot syndrome \par imrpoved \par \par 3) vit d def \par cont vit d  \par dose increased \par \par 4) hypertension\par improved \par s/p eval with pcp  will be seeing dr rangel for new pcp \par \par \par 5) incontinence \par  follow-up with urology, will need cystoscopy eventually \par \par \par 6) neuropathy \par imrpoved now \par \par 7) iron def \par hb stable \par \par \par 8) b12 def \par cont b12 oral \par \par 9) health maintenance \par mammogram is up to date,  pap up to date  \par \par 10) restless legs \par on iron , imporved ferritin \par cont mirapex \par cont CBD \par \par 11)  nausea \par cont sea bands, helping significantly overall with nausea\par Continue medical marijuana\par cont  compazine before lonsurf \par zofran prn \par \par 12) anxiety \par s/p f/u pysch will cont to follow up \par improved \par regimen being adjusted by her psychiatrist \par \par 13 weight loss \par advised nutriton  \par \par 14) erica \par improved \par tapering off  gabapentin and Seroquel, cont to  follow-up with psychiatry\par advised to follow up with psych regarding worsening symptoms \par \par 15) lung mets \par as above \par s/p petct as above , s/p ct surg eval \par assymptomatic \par no bx for now \par now on lonsurf \par \par 16) constipation \par cont senna and miralax prn \par minimize zofran \par \par 6/23/22:  Reviewed CT C/A/P from 6/20/22 which showed slight progression of pulmonary nodules and paraortic lymph nodes. There was also increase in her CEA. NTRK fusion was requested from [facility] on her block which they had difficulty locating but were finally able to find and is pending. Discussed with patient and  at length.  We will also ask for addendum from radiologist regarding the presence of ascites on prior scans.  \par reviewed results so far with no actionable mutations. \par reviewed labs; her ANC is 0.8. we will give zarxio and have issued neutropenic precautions. \par Continue routine, age-appropriate, healthcare maintenance \par Office visit in 3 weeks or prn for new or worsening symptoms.  \par

## 2022-08-10 ENCOUNTER — LABORATORY RESULT (OUTPATIENT)
Age: 62
End: 2022-08-10

## 2022-08-10 ENCOUNTER — RESULT REVIEW (OUTPATIENT)
Age: 62
End: 2022-08-10

## 2022-08-10 ENCOUNTER — APPOINTMENT (OUTPATIENT)
Dept: HEMATOLOGY ONCOLOGY | Facility: CLINIC | Age: 62
End: 2022-08-10

## 2022-08-10 VITALS
DIASTOLIC BLOOD PRESSURE: 82 MMHG | RESPIRATION RATE: 18 BRPM | OXYGEN SATURATION: 100 % | SYSTOLIC BLOOD PRESSURE: 124 MMHG | HEART RATE: 82 BPM | WEIGHT: 165 LBS | BODY MASS INDEX: 30.36 KG/M2 | HEIGHT: 61.97 IN | TEMPERATURE: 97.7 F

## 2022-08-10 PROCEDURE — 99214 OFFICE O/P EST MOD 30 MIN: CPT

## 2022-08-17 LAB — CEA SERPL-MCNC: 64.7 NG/ML

## 2022-08-17 NOTE — HISTORY OF PRESENT ILLNESS
[0 - No Distress] : Distress Level: 0 [de-identified] : This is a 60 y/o woman wiiht a hx fo stage 4 colon cancer , Kras wt, MSI stable, rectal and sigmoid primary , found to have peritoneal disease at time  of initial staging. \par s/p folfirinox avastin originally then irinotecan dropped for toxicity.  S/p FOLFOX avastin, x6 months now on xeloda alone, avastin has been held due to hypertension. \par Patient underwent resection for the 2 primaries as well as debulking in oct 2018, with dr way at [place].  Pathology revealed, complete response in rectum but residual moderately differentiated adenocarcinoma of sigmoid, omentum positive for adeno and right ovary positive for metasatic colon cancer . No perforation was noted but pos for perineuural invasion. \par rectum ypT1No\par sigmoid ypT3No M1c \par \par patient has been YOLIS , colonoscopy may 2019 one polyp. \par last ct scan negative for mets, + fatty liver feb 2020 \par \par cea is 3.8 which is up in may \par ct scan june 2020 shows slight increase in adenopathy 8 mm to 1.1 cm \par \par \par PETCT - done in july 2020 - no uptake in nodes in retroperitoneum \par Foundation testing shows BRCA 2 mutation and also p53 mutation \par \par [facility] - RODOLFO mutation x2(2 diff heterogenous mutations uncertain sig ) , MSH6 heterogenous (undetermined sig) \par \par \par sept 2020 \par vit d 54, ferritin 46 \par mammogram was done at [facility] in [place] on 10/02/20 and was negative\par ct scan in nov 2020 - stable RP nodes , new multiple nodules up to 4 mm in lungs bilaterally , compared to feb 2020 ( no chest done in june 2020) \par \par CT scan of the chest abdomen and pelvis February 2021 revealing for progression of pulmonary metastases with slight increase in size and number of previously noted lung nodules, right lower lobe 6 mm up from 3 mm left upper lobe 5 mm up from 3 mm left lower lobe 6 mm up from 3 mm, the previously described retroperitoneal lymphadenopathy is stable at 1.1 cm there are no other areas of disease.\par \par -Patient with progression on Xeloda and Avastin, with worsening lung nodules on the scan in February 2021 switched to  oxaliplatin, Xeloda and Avastin\par -Cycle 2 of oxaliplatin was stopped after less than half of the infusion due to allergic reaction( 3/24/21) \par -now on irinotecan every 3 weeks ( xeloda dc due to toxicity ) \par -CT scan reviewed shows some slight progression in lung nodules but otherwise stable\par  was continued on the irinotecan and Avastin\par ct of c/a/p - slight increase of lung nodule- 9mm from 6mm rll, 7 mm from 5mm , 8 mm from 6mm , RP node sstable \par no other areas of disease \par \par CT scan of the chest performed in July 2021 is stable compared to last month.\par \par colonsocopy negative nov 19th \par Nov 23 ct scan stable lung nodule \par \par petct shows progression in lung and RP nodes compared to last year \par saw dr bravo no excsional bx recommended \par \par Repeat CT scan of the chest abdomen pelvis in November 2021 shows some slight increase in size of the lung nodules but overall stability.  No evidence of any other metastatic disease.  Avastin has been on hold due to\par Dental procedures and colonoscopy.  She was  off for about 2 months\par - CEA now increased so added xeloda back in dec 2021.\par -Since Avastin restarted and Xeloda added back the CEA has gone from 19 down to 12.\par -up todate on colonscopy \par -PET/CT reviewed shows PET avid disease in the lungs and retroperitoneum, progressive compared to prior but this last PET scan was over a year ago. No evidence of other areas of disease ( such as bone or liver)\par \par Lung nodules cont to progress march 2022  , on avastin irinotecan and xeloda  [de-identified] : She presents for follow-up of the pt presents for follow up of met colorectal ca on Lonrf.  We are awaiting the results of HER2/georges from block from [place] which has been delayed because of their inability to find it.  She reports being fatigued on Lonsurf  CT C/A/P from 6/20/22 showed slight progression of pulmonary nodules and paraortic lymph nodes.  Her CEA has leveled off at 64.7.  Requested mutational testing on her block from [facility] but so far no actionable mutations.  \par \par  \par overall doing much better on the Lonsurf.  then on chemo \par eating ok  , taking procholorazpem every day \par \par bowels moving well now  a little thicker \par pacing continues , some days are bad, then starts up again \par stopped benzaprine due to fatigue and constipation \par starting 3rd month on monday. \par psych is doing all meds - cloazepam paxil seraquel and gabapentin \par sinuses causing sob  when nose clogged  seeing ent next week ent and allergy \par body aches tyelnol , better on lonsurf \par \par

## 2022-08-17 NOTE — PHYSICAL EXAM
[Restricted in physically strenuous activity but ambulatory and able to carry out work of a light or sedentary nature] : Status 1- Restricted in physically strenuous activity but ambulatory and able to carry out work of a light or sedentary nature, e.g., light house work, office work [Obese] : obese [Normal] : grossly intact [de-identified] : colostomy with  hernia , non tender no masses  [de-identified] : very anxious

## 2022-08-17 NOTE — ASSESSMENT
[FreeTextEntry1] : This is a  60 y/o woman with a hx of metastatic colorectal cancer, s/p hemicolectomy and debulking procedure. Kras,nras , braf  wildtype, MSI stable \par s/p folfox(neoadjuvant)  and in joseph of avastin due to hypertension. Now on maintnace xeloda with out evidence of disease\par foundation testing which found a BRCA mutation \par the invitae testiing does not indcate a brca germline, but did find 2 RODOLFO and one MSH6. (all are VUS) \[facility], with worsening lung nodules on the scan in February 2021 switched to  oxaliplatin, Xeloda and Avastin\par -Cycle 2 of oxaliplatin was stopped after less than half of the infusion due to allergic reaction( 3/24/21) \par -now on irinotecan every 3 weeks ( xeloda dc due to toxicity ) \par -CT scan reviewed shows some slight progression in lung nodules but otherwise stable\par  was continued on the irinotecan and Avastin \par Repeat CT scan of the chest abdomen pelvis in November 2021 shows some slight increase in size of the lung nodules but overall stability.  No evidence of any other metastatic disease.  Avastin has been on hold due to\par Dental procedures and colonoscopy.  She was  off for about 2 months\par - CEA now increased so added xeloda back in dec 2021.\par -Since Avastin restarted and Xeloda added back the CEA has gone from 19 down to 12.\par -up todate on colonscopy \par -PET/CT reviewed shows PET avid disease in the lungs and retroperitoneum, progressive compared to prior but this last PET scan was over a year ago. No evidence of other areas of disease ( such as bone or liver) \par -Appreciate CT surgery evaluation no immediate plans for surgical resection of any the nodules due to the invasive nature of the procedures\par \par 1) colon cancer \par doing well overall\par -history as above \par -on  irinotecan , (dose redcued) with avastin  and  xeloda( added in dec 2021  ) \par -CT scan of the chest abdomen and pelvis done March 14, 2022 at [facility] shows some slight progression in lung nodules including the left upper lobe measuring 1 x 0.7 cm previously 0.8 x 0.6 left lower lobe measuring 1.1 x 0.8 previously 0.8 x 0.6 in the right lower lobe lung nodule 1.2 x 1 cm previously 1 x 0.9.\par Retroperitoneal adenopathy is stable there are no new areas of disease\par -Consistent with slowly progressive disease, changed to lonsurf in march 2022 \par -Patient has not had Erbitux or vectibix yet but we decided to go forward with Lonsurf as it is an oral oral regimen. And patient needed a break from IV therapy \par -Given that patient is negative for mutations in KRAS NRAS and BRAF will check for HER-2 amplification.  On the peripheral blood foundation medicine testing this was negative.  But will request on the pathology specimen \par to look for possible new target (her2) \par -I still do not have the results of the HER2 from [place].  I called them again today and they still have not sent the block after 2 months.  They promised me that they will send it out this week.\par -Patient has started the Lonsurf at reduced dose and overall is tolerating well started in March 2022\par -Nausea and diarrhea are improved.\par -signaterra pending  \par - cea increasing , repeat cea  and cmp today \par -repeat scans of c/a/p have been set up for 6/17/2022\par -Completed the third month of lonsurf \par -She will return in 1 week for follow-up and to review the above-mentioned work-up and for further management recommendations.\par \par \par 2)hand foot syndrome \par imrpoved \par \par 3) vit d def \par cont vit d  \par dose increased \par \par 4) hypertension\par improved \par s/p eval with pcp  will be seeing dr rangel for new pcp \par \par \par 5) incontinence \par  follow-up with urology, will need cystoscopy eventually \par \par \par 6) neuropathy \par imrpoved now \par \par 7) iron def \par hb stable \par \par \par 8) b12 def \par cont b12 oral \par \par 9) health maintenance \par mammogram is up to date,  pap up to date  \par \par 10) restless legs \par on iron , imporved ferritin \par cont mirapex \par cont CBD \par \par 11)  nausea \par cont sea bands, helping significantly overall with nausea\par Continue medical marijuana\par cont  compazine before lonsurf \par zofran prn \par \par 12) anxiety \par s/p f/u pysch will cont to follow up \par improved \par regimen being adjusted by her psychiatrist \par \par 13 weight loss \par advised nutriton  \par \par 14) erica \par improved \par tapering off  gabapentin and Seroquel, cont to  follow-up with psychiatry\par advised to follow up with psych regarding worsening symptoms \par \par 15) lung mets \par as above \par s/p petct as above , s/p ct surg eval \par assymptomatic \par no bx for now \par now on lonsurf \par \par 16) constipation \par cont senna and miralax prn \par minimize zofran \par \par 6/23/22:  Reviewed CT C/A/P from 6/20/22 which showed slight progression of pulmonary nodules and paraortic lymph nodes. There was also increase in her CEA. NTRK fusion was requested from [facility] on her block which they had difficulty locating but were finally able to find and is pending. Discussed with patient and  at length.  We will also ask for addendum from radiologist regarding the presence of ascites on prior scans.  \par reviewed results so far with no actionable mutations. \par reviewed labs; her ANC is 0.8. we will give zarxio and have issued neutropenic precautions. \par \par 8/10/2022\par She presents for follow-up on dose reduced Lonsurf.  She is pancytopenic today.  We will give her Zarxio 480 mcg for ANC of 0.9.  We await report from HER2/georges.\par Monitor CEA \par Continue routine, age-appropriate, healthcare maintenance\par History of present illness, review of systems, physical exam and treatment plan reviewed with .\par Office visit in 3 weeks or prn for new or worsening symptoms.  \par

## 2022-09-07 ENCOUNTER — APPOINTMENT (OUTPATIENT)
Dept: HEMATOLOGY ONCOLOGY | Facility: CLINIC | Age: 62
End: 2022-09-07

## 2022-09-07 ENCOUNTER — RESULT REVIEW (OUTPATIENT)
Age: 62
End: 2022-09-07

## 2022-09-07 VITALS
DIASTOLIC BLOOD PRESSURE: 67 MMHG | TEMPERATURE: 98.5 F | RESPIRATION RATE: 18 BRPM | BODY MASS INDEX: 30 KG/M2 | OXYGEN SATURATION: 98 % | SYSTOLIC BLOOD PRESSURE: 112 MMHG | WEIGHT: 163 LBS | HEIGHT: 62 IN | HEART RATE: 79 BPM

## 2022-09-07 PROCEDURE — 99214 OFFICE O/P EST MOD 30 MIN: CPT

## 2022-09-08 LAB — CEA SERPL-MCNC: 69.3 NG/ML

## 2022-09-08 NOTE — PHYSICAL EXAM
[Restricted in physically strenuous activity but ambulatory and able to carry out work of a light or sedentary nature] : Status 1- Restricted in physically strenuous activity but ambulatory and able to carry out work of a light or sedentary nature, e.g., light house work, office work [Obese] : obese [Normal] : grossly intact [de-identified] : colostomy with  hernia , non tender no masses  [de-identified] : very anxious

## 2022-09-08 NOTE — RESULTS/DATA
[FreeTextEntry1] : WBC 1.61 \par ANC 0.8\par Hemoglobin 9.0\par Hematocrit 26.0\par Platelet count 143,000

## 2022-09-08 NOTE — ASSESSMENT
[FreeTextEntry1] : This is a  62 y/o woman with a hx of metastatic colorectal cancer, s/p hemicolectomy and debulking procedure. Kras,nras , braf  wildtype, MSI stable \par s/p folfox(neoadjuvant)  and in joseph of avastin due to hypertension. Now on maintnace xeloda with out evidence of disease\par foundation testing which found a BRCA mutation \par the invitae testiing does not indcate a brca germline, but did find 2 RODOLFO and one MSH6. (all are VUS) \[facility], with worsening lung nodules on the scan in February 2021 switched to  oxaliplatin, Xeloda and Avastin\par -Cycle 2 of oxaliplatin was stopped after less than half of the infusion due to allergic reaction( 3/24/21) \par -now on irinotecan every 3 weeks ( xeloda dc due to toxicity ) \par -CT scan reviewed shows some slight progression in lung nodules but otherwise stable\par  was continued on the irinotecan and Avastin \par Repeat CT scan of the chest abdomen pelvis in November 2021 shows some slight increase in size of the lung nodules but overall stability.  No evidence of any other metastatic disease.  Avastin has been on hold due to\par Dental procedures and colonoscopy.  She was  off for about 2 months\par - CEA now increased so added xeloda back in dec 2021.\par -Since Avastin restarted and Xeloda added back the CEA has gone from 19 down to 12.\par -up todate on colonscopy \par -PET/CT reviewed shows PET avid disease in the lungs and retroperitoneum, progressive compared to prior but this last PET scan was over a year ago. No evidence of other areas of disease ( such as bone or liver) \par -Appreciate CT surgery evaluation no immediate plans for surgical resection of any the nodules due to the invasive nature of the procedures\par \par 1) colon cancer \par doing well overall\par -history as above \par -on  irinotecan , (dose redcued) with avastin  and  xeloda( added in dec 2021  ) \par -CT scan of the chest abdomen and pelvis done March 14, 2022 at [facility] shows some slight progression in lung nodules including the left upper lobe measuring 1 x 0.7 cm previously 0.8 x 0.6 left lower lobe measuring 1.1 x 0.8 previously 0.8 x 0.6 in the right lower lobe lung nodule 1.2 x 1 cm previously 1 x 0.9.\par Retroperitoneal adenopathy is stable there are no new areas of disease\par -Consistent with slowly progressive disease, changed to lonsurf in march 2022 \par -Patient has not had Erbitux or vectibix yet but we decided to go forward with Lonsurf as it is an oral oral regimen. And patient needed a break from IV therapy \par -Given that patient is negative for mutations in KRAS NRAS and BRAF will check for HER-2 amplification.  On the peripheral blood foundation medicine testing this was negative.  But will request on the pathology specimen \par to look for possible new target (her2) \par -I still do not have the results of the HER2 from [facility].  I called them again today and they still have not sent the block after 2 months.  They promised me that they will send it out this week.\par -Patient has started the Lonsurf at reduced dose and overall is tolerating well started in March 2022\par -Nausea and diarrhea are improved.\par -signaterra pending  \par - cea increasing , repeat cea  and cmp today \par -repeat scans of c/a/p have been set up for 6/17/2022\par -Completed the third month of lonsurf \par -She will return in 1 week for follow-up and to review the above-mentioned work-up and for further management recommendations.\par \par 6/23/22:  Reviewed CT C/A/P from 6/20/22 which showed slight progression of pulmonary nodules and paraortic lymph nodes. There was also increase in her CEA. NTRK fusion was requested from [facility] on her block which they had difficulty locating but were finally able to find and is pending. Discussed with patient and  at length.  We will also ask for addendum from radiologist regarding the presence of ascites on prior scans.  \par reviewed results so far with no actionable mutations. \par reviewed labs; her ANC is 0.8. we will give zarxio and have issued neutropenic precautions. \par \par 8/10/2022\par She presents for follow-up on dose reduced Lonsurf.  She is pancytopenic today.  We will give her Zarxio 480 mcg for ANC of 0.9.  We await report from HER2/georges.\par Monitor CEA \par \par 9/7/2022.  We reviewed results of the block from [facility] which showed HER2/georges as being negative.  We also reviewed her prior chemotherapy regimens which are the following; Xeloda/Avastin, irinotecan/Avastin and oxaliplatin/Xeloda.  Mutational status is wild-type therefore we will consider irinotecan/panitumumab or Erbitux in the future. We are monitoring her CEA which is rising slowly, most recently 69.3.\par She has been taking Lonsurf since March 2022.  In light of rising CEA and some progression on last scan we have elected to restage with CT of chest abdomen pelvis in the upcoming weeks.  \par Labs were reviewed today and reveal pancytopenia.  Her ANC today is 0.8.  We will give her Zarxio 480 mcg and issue neutropenic precautions.\par Continue routine, age-appropriate, healthcare maintenance\par History of present illness, review of systems, physical exam and treatment plan reviewed with .\par Office visit in 3 weeks or prn for new or worsening symptoms.  \par

## 2022-09-08 NOTE — REASON FOR VISIT
[Follow-Up Visit] : a follow-up [FreeTextEntry2] : Here for follow-up of metastatic colorectal cancer.

## 2022-09-08 NOTE — HISTORY OF PRESENT ILLNESS
[0 - No Distress] : Distress Level: 0 [de-identified] : This is a 62 y/o woman wiiht a hx fo stage 4 colon cancer , Kras wt, MSI stable, rectal and sigmoid primary , found to have peritoneal disease at time  of initial staging. \par s/p folfirinox avastin originally then irinotecan dropped for toxicity.  S/p FOLFOX avastin, x6 months now on xeloda alone, avastin has been held due to hypertension. \par Patient underwent resection for the 2 primaries as well as debulking in oct 2018, with dr way at [place].  Pathology revealed, complete response in rectum but residual moderately differentiated adenocarcinoma of sigmoid, omentum positive for adeno and right ovary positive for metasatic colon cancer . No perforation was noted but pos for perineuural invasion. \par rectum ypT1No\par sigmoid ypT3No M1c \par \par patient has been YOLIS , colonoscopy may 2019 one polyp. \par last ct scan negative for mets, + fatty liver feb 2020 \par \par cea is 3.8 which is up in may \par ct scan june 2020 shows slight increase in adenopathy 8 mm to 1.1 cm \par \par \par PETCT - done in july 2020 - no uptake in nodes in retroperitoneum \par Foundation testing shows BRCA 2 mutation and also p53 mutation \par \par Inv[facility] - RODOLFO mutation x2(2 diff heterogenous mutations uncertain sig ) , MSH6 heterogenous (undetermined sig) \par \par \par sept 2020 \par vit d 54, ferritin 46 \par mammogram was done at [facility] in [place] on 10/02/20 and was negative\par ct scan in nov 2020 - stable RP nodes , new multiple nodules up to 4 mm in lungs bilaterally , compared to feb 2020 ( no chest done in june 2020) \par \par CT scan of the chest abdomen and pelvis February 2021 revealing for progression of pulmonary metastases with slight increase in size and number of previously noted lung nodules, right lower lobe 6 mm up from 3 mm left upper lobe 5 mm up from 3 mm left lower lobe 6 mm up from 3 mm, the previously described retroperitoneal lymphadenopathy is stable at 1.1 cm there are no other areas of disease.\par \par -Patient with progression on Xeloda and Avastin, with worsening lung nodules on the scan in February 2021 switched to  oxaliplatin, Xeloda and Avastin\par -Cycle 2 of oxaliplatin was stopped after less than half of the infusion due to allergic reaction( 3/24/21) \par -now on irinotecan every 3 weeks ( xeloda dc due to toxicity ) \par -CT scan reviewed shows some slight progression in lung nodules but otherwise stable\par  was continued on the irinotecan and Avastin\par ct of c/a/p - slight increase of lung nodule- 9mm from 6mm rll, 7 mm from 5mm , 8 mm from 6mm , RP node sstable \par no other areas of disease \par \par CT scan of the chest performed in July 2021 is stable compared to last month.\par \par colonsocopy negative nov 19th \par Nov 23 ct scan stable lung nodule \par \par petct shows progression in lung and RP nodes compared to last year \par saw dr bravo no excsional bx recommended \par \par Repeat CT scan of the chest abdomen pelvis in November 2021 shows some slight increase in size of the lung nodules but overall stability.  No evidence of any other metastatic disease.  Avastin has been on hold due to\par Dental procedures and colonoscopy.  She was  off for about 2 months\par - CEA now increased so added xeloda back in dec 2021.\par -Since Avastin restarted and Xeloda added back the CEA has gone from 19 down to 12.\par -up todate on colonscopy \par -PET/CT reviewed shows PET avid disease in the lungs and retroperitoneum, progressive compared to prior but this last PET scan was over a year ago. No evidence of other areas of disease ( such as bone or liver)\par \par Lung nodules cont to progress march 2022  , on avastin irinotecan and xeloda  [de-identified] : She presents for follow-up of the pt presents for follow up of met colorectal ca on Lonsurf.  HER2/georges from block from [place] was negative and there were no actionable mutations. She continues to be fatigued on Lonsurf.  She has also been pancytopenic and has required white cell reports support with [facility].   CT C/A/P from 6/20/22 showed slight progression of pulmonary nodules and paraortic lymph nodes.  Her CEA is rising slightly, most recently 69.3 (previous 64.7).  \par \par  \par

## 2022-10-01 ENCOUNTER — NON-APPOINTMENT (OUTPATIENT)
Age: 62
End: 2022-10-01

## 2022-10-06 ENCOUNTER — APPOINTMENT (OUTPATIENT)
Dept: HEMATOLOGY ONCOLOGY | Facility: CLINIC | Age: 62
End: 2022-10-06

## 2022-10-06 ENCOUNTER — RESULT REVIEW (OUTPATIENT)
Age: 62
End: 2022-10-06

## 2022-10-06 VITALS
OXYGEN SATURATION: 100 % | SYSTOLIC BLOOD PRESSURE: 135 MMHG | WEIGHT: 161 LBS | HEART RATE: 87 BPM | RESPIRATION RATE: 18 BRPM | HEIGHT: 62 IN | DIASTOLIC BLOOD PRESSURE: 83 MMHG | BODY MASS INDEX: 29.63 KG/M2 | TEMPERATURE: 98.6 F

## 2022-10-06 PROCEDURE — 99214 OFFICE O/P EST MOD 30 MIN: CPT

## 2022-10-10 ENCOUNTER — NON-APPOINTMENT (OUTPATIENT)
Age: 62
End: 2022-10-10

## 2022-10-11 ENCOUNTER — NON-APPOINTMENT (OUTPATIENT)
Age: 62
End: 2022-10-11

## 2022-10-11 LAB — CEA SERPL-MCNC: 90.6 NG/ML

## 2022-10-12 ENCOUNTER — NON-APPOINTMENT (OUTPATIENT)
Age: 62
End: 2022-10-12

## 2022-10-13 NOTE — PHYSICAL EXAM
[Restricted in physically strenuous activity but ambulatory and able to carry out work of a light or sedentary nature] : Status 1- Restricted in physically strenuous activity but ambulatory and able to carry out work of a light or sedentary nature, e.g., light house work, office work [Obese] : obese [Normal] : grossly intact [de-identified] : colostomy with  hernia , non tender no masses  [de-identified] : very anxious

## 2022-10-13 NOTE — ASSESSMENT
[FreeTextEntry1] : This is a  62 year old woman with metastatic colorectal cancer, s/p hemicolectomy and debulking procedure. KRAS, NRAS, BRAF wildtype, MSI stable s/p FOLFOX (neoadjuvant)  and in to of Avastin due to hypertension. She was switched to Xeloda/Avastin with noted POD and transitioned to oxaliplatin, Xeloda and Avastin (2/2021). Oxaliplatin was discontinued at C2 2/2 to allergic reaction (3/24/21) and switched to irinotecan. Xeloda was d/c due to toxicity. \par \par Repeat CT scan of the chest abdomen pelvis in November 2021 shows some slight increase in size of the lung nodules but overall stability.  No evidence of any other metastatic disease.  Avastin has been on hold due to\par Dental procedures and colonoscopy.  She was  off for about 2 months. CEA began to rise so Xeloda/Avastin was restarted in 12/2021. \par \par PET/CT reviewed shows PET avid disease in the lungs and retroperitoneum, progressive compared to prior but this last PET scan was over a year ago. No evidence of other areas of disease ( such as bone or liver). Seen by CT surgery with no immediate plans for surgical resection of any the nodules due to the invasive nature of the procedures\par \par Of note, foundation testing which found a BRCA mutation. [facility] testing does not indicate a BRCA germline, but did find 2 RODOLFO and one MSH6. (all are VUS) \par \par CT scan of the chest abdomen and pelvis done March 14, 2022 at [facility] shows some slight progression in lung nodules including the left upper lobe measuring 1 x 0.7 cm previously 0.8 x 0.6 left lower lobe measuring 1.1 x 0.8 previously 0.8 x 0.6 in the right lower lobe lung nodule 1.2 x 1 cm previously 1 x 0.9.Retroperitoneal adenopathy is stable there are no new areas of disease.\par \par Imaging consistent with slowly progressive disease, she was changed to Lonsurf in March 2022. \par \par Of note, patient has not had Erbitux or vectibix yet but we decided to go forward with dose reduced Lonsurf as it is an oral regimen and patient needed a break from IV therapy, started March 2022. Given that patient is negative for mutations in KRAS NRAS and BRAF will check for HER-2 amplification.  On the peripheral blood foundation medicine testing this was negative.  But will request on the pathology specimen to look for possible new target (her2). Signaterra pending. \par \par 6/23/22:  Reviewed CT C/A/P from 6/20/22 which showed slight progression of pulmonary nodules and paraortic lymph nodes. There was also increase in her CEA. NTRK fusion was requested from [facility] on her block which they had difficulty locating but were finally able to find and is pending. Discussed with patient and  at length.  We will also ask for addendum from radiologist regarding the presence of ascites on prior scans.  \par reviewed results so far with no actionable mutations. \par \par 9/7/2022.  We reviewed results of the block from [place] which showed HER2/georges as being negative.  We also reviewed her prior chemotherapy regimens which are the following; Xeloda/Avastin, irinotecan/Avastin and oxaliplatin/Xeloda.  Mutational status is wild-type therefore we will consider irinotecan/panitumumab or Erbitux in the future. We are monitoring her CEA which is rising slowly, most recently 69.3.She has been taking Lonsurf since March 2022.  In light of rising CEA and some progression on last scan recommend interval CT CAP imaging. \par \par 9/26/22 CT CAP imaging which revealed L para-aortic/L psoas mass is slightly increased in size measuring 4 x 2.5 cm. Additionally there is new bony destruction of the adjacent L3 vertebral body measuring 1.5 x 1.8 cm. There is a 1.3 x 1.5 cm subtle are of liver enhancement adjacent to the gallbladder. This is somewhat more pronounced when compared to prior study it is unclear if this represents a true underlying lesion versus perfusion anomaly. Pulmonary nodules are stable to slightly smaller compared to prior study. \par \par Plan:\par - blood work today. CBC reveals ANC 0.8. Will order Zarxio to be given today. \par - CT Imaging reviewed at length with patient and . Reviewed that imaging notes slight interval enlargement to psoas mass and indicates new bony lesion at L3. Given these results, patient is now with some POD on Lonsurf. Patient also with symptoms related to L3 lesion cause her persistent low back pain. Discussed with patient and wife, that given scan results and symptoms despite use of Lonsurf consistent with POD, the next step would be to change treatment regimen. Patient and  in agreement with plan. \par - Lengthy review of patients treatment history with Dr. Chacon. Patient had received Irinotecan in the past which was discontinued as patient wished to be on oral regimen. Given patient is KRAS, NRAS, BRAF wild type would favor using  panatumumab. Would also favor double agent over single agent. Would recommend irinotecan and panatumumab. Reviewed logistics, side effect profile. Patient has mediport in place. Will start next week. \par - Patient with new back pain. Will consider RT if no improvement with new regimen. Will refer to Dr. Pederson for palliative care, symptom control. \par - Continue routine, age-appropriate, healthcare maintenance.\par - History of present illness, review of systems, physical exam and treatment plan reviewed with Dr. Martin. \par - Office visit in 1 week to start new treatment or prn for new or worsening symptoms.  \par

## 2022-10-13 NOTE — HISTORY OF PRESENT ILLNESS
[0 - No Distress] : Distress Level: 0 [de-identified] : This is a 62 year old woman with a history of stage 4 colon cancer, KRAS wild type, MSI stable, rectal and sigmoid primary, found to have peritoneal disease at time of initial staging.  She is s/p FOLFIRINOX and Avastin originally, irinotecan dropped for toxicity. She is s/p FOLFOX avastin x6 months. Avastin held 2/2 HTN and she is now on xeloda alone.\par \par Patient underwent resection for the 2 primaries as well as debulking in Oct 2018, with Dr. Parikh at [facility].  Pathology revealed, complete response in rectum but residual moderately differentiated adenocarcinoma of sigmoid, omentum positive for adeno and right ovary positive for metastatic colon cancer . No perforation was noted but pos for perineural invasion. \par \par Rectum ypT1No\par \par Sigmoid ypT3No M1c \par \par Patient has been YOLIS , colonoscopy may 2019 one polyp. \par \par 2/2020 CT Imaging negative for mets, + fatty liver feb 2020. \par \par Cea is 3.8 which is up in may \par \par 6/2020 CT Imaging showed slight increase in adenopathy 8 mm to 1.1 cm \par \par 7/2020 PETCT - no uptake in nodes in retroperitoneum \par \par Foundation testing shows BRCA 2 mutation and also p53 mutation \par \par [facility] - RODOLFO mutation x2(2 diff heterogenous mutations uncertain sig ) , MSH6 heterogenous (undetermined sig) \par \par 10/2020 Mammogram done at [facility] in [place]  was negative\par \par 11/2020 CT Imaging - stable RP nodes , new multiple nodules up to 4 mm in lungs bilaterally , compared to feb 2020 \par \par 2/2021 CT CAP - revealing for progression of pulmonary metastases with slight increase in size and number of previously noted lung nodules, right lower lobe 6 mm up from 3 mm left upper lobe 5 mm up from 3 mm left lower lobe 6 mm up from 3 mm, the previously described retroperitoneal lymphadenopathy is stable at 1.1 cm there are no other areas of disease.\par \par -Patient with progression on Xeloda and Avastin, with worsening lung nodules on the scan in February 2021 switched to  oxaliplatin, Xeloda and Avastin\par -Cycle 2 of oxaliplatin was stopped after less than half of the infusion due to allergic reaction( 3/24/21) \par -now on irinotecan every 3 weeks ( xeloda dc due to toxicity ) \par -CT scan reviewed shows some slight progression in lung nodules but otherwise stable\par  was continued on the irinotecan and Avastin\par ct of c/a/p - slight increase of lung nodule- 9mm from 6mm rll, 7 mm from 5mm , 8 mm from 6mm , RP node sstable \par no other areas of disease \par \par 7/2021 CT Chest is stable \par \par 11/19/21 Colonsocopy negative \par  \par 11/23/21 CT Chest stable lung nodule \par \par PET/CT shows progression in lung and RP nodes compared to last year \par \par saw dr bravo no excsional bx recommended \par \par Repeat CT scan of the chest abdomen pelvis in November 2021 shows some slight increase in size of the lung nodules but overall stability.  No evidence of any other metastatic disease.  Avastin has been on hold due to\par Dental procedures and colonoscopy.  She was  off for about 2 months\par - CEA now increased so added xeloda back in dec 2021.\par -Since Avastin restarted and Xeloda added back the CEA has gone from 19 down to 12.\par -up todate on colonscopy \par -PET/CT reviewed shows PET avid disease in the lungs and retroperitoneum, progressive compared to prior but this last PET scan was over a year ago. No evidence of other areas of disease ( such as bone or liver)\par \par Lung nodules cont to progress march 2022, on avastin irinotecan and xeloda  [de-identified] : She presents for routine follow up for metastatic colorectal ca on dose reduced Lonsurf.  HER2/georges from block from [place] was negative and there were no actionable mutations.  She has also been pancytopenic and has required white cell reports support with Zarxio.  In the interim she underwent CT CAP imaging which revealed L para-aortic/L psoas mass is slightly increased in size measuring 4 x 2.5 cm. Additionally there is new bony destruction of the adjacent L3 vertebral body measuring 1.5 x 1.8 cm. There is a 1.3 x 1.5 cm subtle are of liver enhancement adjacent to the gallbladder. This is somewhat more pronounced when compared to prior study it is unclear if this represents a true underlying lesion versus perfusion anomaly. Pulmonary nodules are stable to slightly smaller compared to prior study. She presents today with 7/10 persistent low back pain for which she is using Tylenol and Aleve with minimal relief. Of note, she is also on gabapentin 300mg TID. She continues to endorse nausea and abdominal cramping for which she uses Zofran and Compazine. She has some constipation and uses stool softeners as needed. She denies other ROS. \par \par  \par

## 2022-10-13 NOTE — REVIEW OF SYSTEMS
[Fatigue] : fatigue [Negative] : Allergic/Immunologic [Abdominal Pain] : abdominal pain [Constipation] : constipation [Muscle Pain] : muscle pain [FreeTextEntry7] : abdominal cramps [FreeTextEntry9] : +lower back pain

## 2022-10-20 ENCOUNTER — APPOINTMENT (OUTPATIENT)
Dept: HEMATOLOGY ONCOLOGY | Facility: CLINIC | Age: 62
End: 2022-10-20

## 2022-10-20 ENCOUNTER — RESULT REVIEW (OUTPATIENT)
Age: 62
End: 2022-10-20

## 2022-10-20 VITALS
HEART RATE: 90 BPM | OXYGEN SATURATION: 94 % | SYSTOLIC BLOOD PRESSURE: 116 MMHG | RESPIRATION RATE: 18 BRPM | WEIGHT: 155 LBS | HEIGHT: 62 IN | DIASTOLIC BLOOD PRESSURE: 76 MMHG | BODY MASS INDEX: 28.52 KG/M2 | TEMPERATURE: 98 F

## 2022-10-20 PROCEDURE — 36415 COLL VENOUS BLD VENIPUNCTURE: CPT

## 2022-10-20 PROCEDURE — 99213 OFFICE O/P EST LOW 20 MIN: CPT | Mod: 25

## 2022-10-20 RX ORDER — CLONAZEPAM 1 MG/1
1 TABLET ORAL
Qty: 60 | Refills: 0 | Status: COMPLETED | COMMUNITY
Start: 2022-02-25 | End: 2022-10-20

## 2022-10-20 RX ORDER — PAROXETINE HYDROCHLORIDE 20 MG/1
20 TABLET, FILM COATED ORAL
Refills: 0 | Status: COMPLETED | COMMUNITY
End: 2022-10-20

## 2022-10-20 RX ORDER — FLUTICASONE PROPIONATE 50 UG/1
50 SPRAY, METERED NASAL
Qty: 16 | Refills: 0 | Status: COMPLETED | COMMUNITY
Start: 2022-03-18 | End: 2022-10-20

## 2022-10-20 RX ORDER — TRIFLURIDINE AND TIPIRACIL 20; 8.19 MG/1; MG/1
20-8.19 TABLET, FILM COATED ORAL
Qty: 40 | Refills: 3 | Status: COMPLETED | COMMUNITY
Start: 2022-03-16 | End: 2022-10-20

## 2022-10-20 RX ORDER — LORAZEPAM 0.5 MG/1
0.5 TABLET ORAL
Qty: 30 | Refills: 0 | Status: COMPLETED | COMMUNITY
Start: 2020-08-20 | End: 2022-10-20

## 2022-10-20 RX ORDER — TRIFLURIDINE AND TIPIRACIL 15; 6.14 MG/1; MG/1
15-6.14 TABLET, FILM COATED ORAL
Qty: 20 | Refills: 3 | Status: COMPLETED | COMMUNITY
Start: 2022-03-16 | End: 2022-10-20

## 2022-10-20 RX ORDER — PAROXETINE HYDROCHLORIDE 10 MG/1
10 TABLET, FILM COATED ORAL
Refills: 0 | Status: COMPLETED | COMMUNITY
End: 2022-10-20

## 2022-10-20 RX ORDER — GABAPENTIN 100 MG/1
100 CAPSULE ORAL
Qty: 300 | Refills: 3 | Status: COMPLETED | COMMUNITY
End: 2022-10-20

## 2022-10-20 RX ORDER — QUETIAPINE FUMARATE 100 MG/1
100 TABLET ORAL
Refills: 0 | Status: COMPLETED | COMMUNITY
End: 2022-10-20

## 2022-10-20 RX ORDER — QUETIAPINE FUMARATE 25 MG/1
25 TABLET ORAL
Qty: 360 | Refills: 0 | Status: COMPLETED | COMMUNITY
Start: 2021-05-11 | End: 2022-10-20

## 2022-10-23 NOTE — ASSESSMENT
[FreeTextEntry1] :  62 year old woman with metastatic colorectal cancer, s/p hemicolectomy and debulking procedure. KRAS, NRAS, BRAF wildtype, MSI stable s/p FOLFOX (neoadjuvant)  \par She was switched to Xeloda/Avastin with noted POD and transitioned to oxaliplatin, Xeloda and Avastin (2/2021). Oxaliplatin was discontinued at C2 2/2 to allergic reaction (3/24/21) and switched to irinotecan. Xeloda was held due to toxicity\par Repeat CT scan of the chest abdomen pelvis in November 2021 shows some slight increase in size of the lung nodules but overall stability.  No evidence of any other metastatic disease.  Avastin has been on hold due to\par Dental procedures and colonoscopy.   CEA began to rise so Xeloda/Avastin was restarted in 12/2021. \par \par Gen path testing 10/19 : BOBBY. No mutations identified\par Foundation testing--7/1/2020----liquid bx -- somatic  BRCA2 splice site mutation and TP53 mutation. Her2 not amplified\par  [facility] testing does not indicate a BRCA germline, but did find 2 RODOLFO and one MSH6. (all are VUS) \par 6/17/22: Her2 FISH negative\par \par CT scan of the chest abdomen and pelvis done March 14, 2022 at [facility] shows some slight progression in lung nodules including the left upper lobe measuring 1 x 0.7 cm previously 0.8 x 0.6 left lower lobe measuring 1.1 x 0.8 previously 0.8 x 0.6 in the right lower lobe lung nodule 1.2 x 1 cm previously 1 x 0.9.Retroperitoneal adenopathy is stable there are no new areas of disease.\par Imaging consistent with slowly progressive disease, she was changed to Lonsurf in March 2022. \par \par 9/26/22 CT CAP imaging which revealed L para-aortic/L psoas mass is slightly increased in size measuring 4 x 2.5 cm. Additionally there is new bony destruction of the adjacent L3 vertebral body measuring 1.5 x 1.8 cm. There is a 1.3 x 1.5 cm subtle are of liver enhancement adjacent to the gallbladder. This is somewhat more pronounced when compared to prior study it is unclear if this represents a true underlying lesion versus perfusion anomaly. Pulmonary nodules are stable to slightly smaller compared to prior study. \par \par Plan:\par \par To start panitumumab ( last dose lonsurf 4 weeks back)\par Palliative RT consideration based on clinical course\par Dental clearence and denosumab\par To consider olaparib in the future ( BRCA2 mutationon liquid bx)\par \par \par

## 2022-10-23 NOTE — PHYSICAL EXAM
[Restricted in physically strenuous activity but ambulatory and able to carry out work of a light or sedentary nature] : Status 1- Restricted in physically strenuous activity but ambulatory and able to carry out work of a light or sedentary nature, e.g., light house work, office work [Obese] : obese [Normal] : grossly intact [de-identified] : colostomy with  hernia , non tender no masses  [de-identified] : very anxious

## 2022-10-23 NOTE — HISTORY OF PRESENT ILLNESS
[0 - No Distress] : Distress Level: 0 [de-identified] : This is a 62 year old woman with a history of stage 4 colon cancer, KRAS wild type, MSI stable, rectal and sigmoid primary, found to have peritoneal disease at time of initial staging.  She is s/p FOLFIRINOX and Avastin originally, irinotecan dropped for toxicity. She is s/p FOLFOX avastin x6 months. Avastin held 2/2 HTN and she is now on xeloda alone.\par \par Patient underwent resection for the 2 primaries as well as debulking in Oct 2018, with Dr. Parikh at [place].  Pathology revealed, complete response in rectum but residual moderately differentiated adenocarcinoma of sigmoid, omentum positive for adeno and right ovary positive for metastatic colon cancer . No perforation was noted but pos for perineural invasion. \par Rectum ypT1No\par Sigmoid ypT3No M1c \par \par Patient has been YOLIS , colonoscopy may 2019 one polyp. \par \par 2/2020 CT Imaging negative for mets, + fatty liver feb 2020. \par \par Cea is 3.8 which is up in may \par \par 6/2020 CT Imaging showed slight increase in adenopathy 8 mm to 1.1 cm \par \par 7/2020 PETCT - no uptake in nodes in retroperitoneum \par \par Foundation testing shows BRCA 2 mutation and also p53 mutation \par \par [facility] - RODOLFO mutation x2(2 diff heterogenous mutations uncertain sig ) , MSH6 heterogenous (undetermined sig) \par \par 10/2020 Mammogram done at [facility] in [place]  was negative\par \par 11/2020 CT Imaging - stable RP nodes , new multiple nodules up to 4 mm in lungs bilaterally , compared to feb 2020 \par \par 2/2021 CT CAP - revealing for progression of pulmonary metastases with slight increase in size and number of previously noted lung nodules, right lower lobe 6 mm up from 3 mm left upper lobe 5 mm up from 3 mm left lower lobe 6 mm up from 3 mm, the previously described retroperitoneal lymphadenopathy is stable at 1.1 cm there are no other areas of disease.\par \par -Patient with progression on Xeloda and Avastin, with worsening lung nodules on the scan in February 2021 switched to  oxaliplatin, Xeloda and Avastin\par -Cycle 2 of oxaliplatin was stopped after less than half of the infusion due to allergic reaction( 3/24/21) \par -then on irinotecan every 3 weeks ( xeloda dc due to toxicity ) \par -CT scan reviewed shows some slight progression in lung nodules but otherwise stable\par  was continued on the irinotecan and Avasti\par \par \par Gen path testing 10/19 : BOBBY. No mutations identified\par Foundation testing--7/1/2020----liquid bx -- somatic  BRCA2 splice site mutation and TP53 mutation. Her2 not amplified\par  [facility] testing does not indicate a BRCA germline, but did find 2 RODOLFO and one MSH6. (all are VUS) \par 6/17/22: Her2 FISH negative\par \par CT scan of the chest abdomen and pelvis done March 14, 2022 at [facility] shows some slight progression in lung nodules including the left upper lobe measuring 1 x 0.7 cm previously 0.8 x 0.6 left lower lobe measuring 1.1 x 0.8 previously 0.8 x 0.6 in the right lower lobe lung nodule 1.2 x 1 cm previously 1 x 0.9.Retroperitoneal adenopathy is stable there are no new areas of disease.\par Imaging consistent with slowly progressive disease, she was changed to [facility] in March 2022.  [de-identified] : She presents for routine follow up for metastatic colorectal ca on dose reduced Lonsurf.  HER2/georges from block from [place] was negative and there were no actionable mutations.  She has also been pancytopenic and has required white cell reports support with [employer].  In the interim she underwent CT CAP imaging which revealed L para-aortic/L psoas mass is slightly increased in size measuring 4 x 2.5 cm. Additionally there is new bony destruction of the adjacent L3 vertebral body measuring 1.5 x 1.8 cm. There is a 1.3 x 1.5 cm subtle are of liver enhancement adjacent to the gallbladder. This is somewhat more pronounced when compared to prior study it is unclear if this represents a true underlying lesion versus perfusion anomaly. Pulmonary nodules are stable to slightly smaller compared to prior study. She presents today with 7/10 persistent low back pain for which she is using Tylenol and Aleve with minimal relief. Of note, she is also on gabapentin 300mg TID. She continues to endorse nausea and abdominal cramping for which she uses Zofran and Compazine. She has some constipation and uses stool softeners as needed. She denies other ROS. \par \par  \par

## 2022-10-23 NOTE — REVIEW OF SYSTEMS
[Fatigue] : fatigue [Abdominal Pain] : abdominal pain [Constipation] : constipation [Muscle Pain] : muscle pain [Negative] : Allergic/Immunologic [FreeTextEntry7] : abdominal cramps [FreeTextEntry9] : +lower back pain

## 2022-11-03 ENCOUNTER — APPOINTMENT (OUTPATIENT)
Dept: HEMATOLOGY ONCOLOGY | Facility: CLINIC | Age: 62
End: 2022-11-03

## 2022-11-03 ENCOUNTER — RESULT REVIEW (OUTPATIENT)
Age: 62
End: 2022-11-03

## 2022-11-03 PROCEDURE — 36415 COLL VENOUS BLD VENIPUNCTURE: CPT

## 2022-11-03 PROCEDURE — 99214 OFFICE O/P EST MOD 30 MIN: CPT | Mod: 25

## 2022-11-09 NOTE — ASSESSMENT
[FreeTextEntry1] :  62 year old woman with metastatic colorectal cancer, s/p hemicolectomy and debulking procedure. KRAS, NRAS, BRAF wildtype, MSI stable s/p FOLFOX (neoadjuvant)  \par She was switched to Xeloda/Avastin with noted POD and transitioned to oxaliplatin, Xeloda and Avastin (2/2021). Oxaliplatin was discontinued at C2 2/2 to allergic reaction (3/24/21) and switched to irinotecan. Xeloda was held due to toxicity\par Repeat CT scan of the chest abdomen pelvis in November 2021 shows some slight increase in size of the lung nodules but overall stability.  No evidence of any other metastatic disease.  Avastin has been on hold due to\par Dental procedures and colonoscopy.   CEA began to rise so Xeloda/Avastin was restarted in 12/2021. \par \par Gen path testing 10/19 : BOBBY. No mutations identified\par Foundation testing--7/1/2020----liquid bx -- somatic  BRCA2 splice site mutation and TP53 mutation. Her2 not amplified\par  [facility] testing does not indicate a BRCA germline, but did find 2 RODOLFO and one MSH6. (all are VUS) \par 6/17/22: Her2 FISH negative\par \par CT scan of the chest abdomen and pelvis done March 14, 2022 at [facility] shows some slight progression in lung nodules including the left upper lobe measuring 1 x 0.7 cm previously 0.8 x 0.6 left lower lobe measuring 1.1 x 0.8 previously 0.8 x 0.6 in the right lower lobe lung nodule 1.2 x 1 cm previously 1 x 0.9.Retroperitoneal adenopathy is stable there are no new areas of disease.\par Imaging consistent with slowly progressive disease, she was changed to [facility] in March 2022. \par \par 9/26/22 CT CAP imaging which revealed L para-aortic/L psoas mass is slightly increased in size measuring 4 x 2.5 cm. Additionally there is new bony destruction of the adjacent L3 vertebral body measuring 1.5 x 1.8 cm. There is a 1.3 x 1.5 cm subtle are of liver enhancement adjacent to the gallbladder. This is somewhat more pronounced when compared to prior study it is unclear if this represents a true underlying lesion versus perfusion anomaly. Pulmonary nodules are stable to slightly smaller compared to prior study. \par \par Panitumumab started 10/20/22\par \par Plan:\par \par For panituumab. Add irinotecn next cycle\par G1 acneiform rash --- add hydrocortisone/clindamycin gel. Reinstructed about precautions\par Palliative RT consideration based on clinical course\par Dental clearence and denosumab\par To consider olaparib in the future ( BRCA2 mutationon liquid bx)\par \par \par

## 2022-11-09 NOTE — HISTORY OF PRESENT ILLNESS
[0 - No Distress] : Distress Level: 0 [de-identified] : This is a 62 year old woman with a history of stage 4 colon cancer, KRAS wild type, MSI stable, rectal and sigmoid primary, found to have peritoneal disease at time of initial staging.  She is s/p FOLFIRINOX and Avastin originally, irinotecan dropped for toxicity. She is s/p FOLFOX avastin x6 months. Avastin held 2/2 HTN and she is now on xeloda alone.\par \par Patient underwent resection for the 2 primaries as well as debulking in Oct 2018, with Dr. Parikh at [place].  Pathology revealed, complete response in rectum but residual moderately differentiated adenocarcinoma of sigmoid, omentum positive for adeno and right ovary positive for metastatic colon cancer . No perforation was noted but pos for perineural invasion. \par Rectum ypT1No\par Sigmoid ypT3No M1c \par \par Patient has been YOLIS , colonoscopy may 2019 one polyp. \par \par 2/2020 CT Imaging negative for mets, + fatty liver feb 2020. \par \par Cea is 3.8 which is up in may \par \par 6/2020 CT Imaging showed slight increase in adenopathy 8 mm to 1.1 cm \par \par 7/2020 PETCT - no uptake in nodes in retroperitoneum \par \par Foundation testing shows BRCA 2 mutation and also p53 mutation \par \par [facility] - RODOLFO mutation x2(2 diff heterogenous mutations uncertain sig ) , MSH6 heterogenous (undetermined sig) \par \par 10/2020 Mammogram done at [facility] in [place]  was negative\par \par 11/2020 CT Imaging - stable RP nodes , new multiple nodules up to 4 mm in lungs bilaterally , compared to feb 2020 \par \par 2/2021 CT CAP - revealing for progression of pulmonary metastases with slight increase in size and number of previously noted lung nodules, right lower lobe 6 mm up from 3 mm left upper lobe 5 mm up from 3 mm left lower lobe 6 mm up from 3 mm, the previously described retroperitoneal lymphadenopathy is stable at 1.1 cm there are no other areas of disease.\par \par -Patient with progression on Xeloda and Avastin, with worsening lung nodules on the scan in February 2021 switched to  oxaliplatin, Xeloda and Avastin\par -Cycle 2 of oxaliplatin was stopped after less than half of the infusion due to allergic reaction( 3/24/21) \par -then on irinotecan every 3 weeks ( xeloda dc due to toxicity ) \par -CT scan reviewed shows some slight progression in lung nodules but otherwise stable\par  was continued on the irinotecan and Avasti\par \par \par Gen path testing 10/19 : BOBBY. No mutations identified\par Foundation testing--7/1/2020----liquid bx -- somatic  BRCA2 splice site mutation and TP53 mutation. Her2 not amplified\par  [facility] testing does not indicate a BRCA germline, but did find 2 RODOLFO and one MSH6. (all are VUS) \par 6/17/22: Her2 FISH negative\par \par CT scan of the chest abdomen and pelvis done March 14, 2022 at [facility] shows some slight progression in lung nodules including the left upper lobe measuring 1 x 0.7 cm previously 0.8 x 0.6 left lower lobe measuring 1.1 x 0.8 previously 0.8 x 0.6 in the right lower lobe lung nodule 1.2 x 1 cm previously 1 x 0.9.Retroperitoneal adenopathy is stable there are no new areas of disease.\par Imaging consistent with slowly progressive disease, she was changed to [facility] in March 2022.  [de-identified] : + diarrhea\par \par  \par

## 2022-11-09 NOTE — PHYSICAL EXAM
[Restricted in physically strenuous activity but ambulatory and able to carry out work of a light or sedentary nature] : Status 1- Restricted in physically strenuous activity but ambulatory and able to carry out work of a light or sedentary nature, e.g., light house work, office work [Obese] : obese [Normal] : grossly intact [de-identified] : colostomy with  hernia , non tender no masses  [de-identified] : very anxious

## 2022-11-17 ENCOUNTER — RESULT REVIEW (OUTPATIENT)
Age: 62
End: 2022-11-17

## 2022-11-17 ENCOUNTER — APPOINTMENT (OUTPATIENT)
Dept: HEMATOLOGY ONCOLOGY | Facility: CLINIC | Age: 62
End: 2022-11-17

## 2022-11-17 VITALS
RESPIRATION RATE: 18 BRPM | WEIGHT: 154 LBS | HEIGHT: 62 IN | SYSTOLIC BLOOD PRESSURE: 107 MMHG | HEART RATE: 84 BPM | DIASTOLIC BLOOD PRESSURE: 75 MMHG | OXYGEN SATURATION: 97 % | TEMPERATURE: 99.1 F | BODY MASS INDEX: 28.34 KG/M2

## 2022-11-17 PROCEDURE — 99214 OFFICE O/P EST MOD 30 MIN: CPT

## 2022-11-26 LAB — CEA SERPL-MCNC: 40.2 NG/ML

## 2022-11-26 NOTE — REVIEW OF SYSTEMS
[Fatigue] : fatigue [Muscle Pain] : muscle pain [Negative] : Allergic/Immunologic [Diarrhea] : diarrhea [Skin Rash] : skin rash [Abdominal Pain] : no abdominal pain [Constipation] : no constipation [FreeTextEntry9] : +lower back pain [de-identified] : acneform rash facies

## 2022-11-26 NOTE — ASSESSMENT
[FreeTextEntry1] :  62 year old woman with metastatic colorectal cancer, s/p hemicolectomy and debulking procedure. KRAS, NRAS, BRAF wildtype, MSI stable s/p FOLFOX (neoadjuvant)  \par She was switched to Xeloda/Avastin with noted POD and transitioned to oxaliplatin, Xeloda and Avastin (2/2021). Oxaliplatin was discontinued at C2 2/2 to allergic reaction (3/24/21) and switched to irinotecan. Xeloda was held due to toxicity\par Repeat CT scan of the chest abdomen pelvis in November 2021 shows some slight increase in size of the lung nodules but overall stability.  No evidence of any other metastatic disease.  Avastin has been on hold due to\par Dental procedures and colonoscopy.   CEA began to rise so Xeloda/Avastin was restarted in 12/2021. \par \par Gen path testing 10/19 : BOBBY. No mutations identified\par Foundation testing--7/1/2020----liquid bx -- somatic  BRCA2 splice site mutation and TP53 mutation. Her2 not amplified\par  [facility] testing does not indicate a BRCA germline, but did find 2 RODOLFO and one MSH6. (all are VUS) \par 6/17/22: Her2 FISH negative\par \par CT scan of the chest abdomen and pelvis done March 14, 2022 at [facility] shows some slight progression in lung nodules including the left upper lobe measuring 1 x 0.7 cm previously 0.8 x 0.6 left lower lobe measuring 1.1 x 0.8 previously 0.8 x 0.6 in the right lower lobe lung nodule 1.2 x 1 cm previously 1 x 0.9.Retroperitoneal adenopathy is stable there are no new areas of disease.\par Imaging consistent with slowly progressive disease, she was changed to [facility] in March 2022. \par \par 9/26/22 CT CAP imaging which revealed L para-aortic/L psoas mass is slightly increased in size measuring 4 x 2.5 cm. Additionally there is new bony destruction of the adjacent L3 vertebral body measuring 1.5 x 1.8 cm. There is a 1.3 x 1.5 cm subtle are of liver enhancement adjacent to the gallbladder. This is somewhat more pronounced when compared to prior study it is unclear if this represents a true underlying lesion versus perfusion anomaly. Pulmonary nodules are stable to slightly smaller compared to prior study. \par \par Panitumumab started 10/20/22\par \par Plan:\par \par For panituumab and irinotecanReviewed available labs, adequate for treatment. CEA fallen to 40 (previous 90)\par G1 acneiform rash --- continue hydrocortisone/clindamycin gel. Reinstructed about precautions.\par Loperamide/Lomotil for diarrhea, prn. Increase PO hydration. \par Palliative RT consideration based on clinical course\par Dental clearence and denosumab\par To consider olaparib in the future ( BRCA2 mutationon liquid bx)\par Continue routine, age-appropriate, healthcare maintenance \par History of present illness, review of systems, physical exam and treatment plan reviewed with Dr. Verito Martin\par Office visit in 2 weeks or prn for new or worsening symptoms. \par \par \par

## 2022-11-26 NOTE — PHYSICAL EXAM
[Restricted in physically strenuous activity but ambulatory and able to carry out work of a light or sedentary nature] : Status 1- Restricted in physically strenuous activity but ambulatory and able to carry out work of a light or sedentary nature, e.g., light house work, office work [Obese] : obese [Normal] : grossly intact [de-identified] : colostomy with  hernia , non tender no masses  [de-identified] : very anxious

## 2022-11-26 NOTE — HISTORY OF PRESENT ILLNESS
[0 - No Distress] : Distress Level: 0 [de-identified] : This is a 62 year old woman with a history of stage 4 colon cancer, KRAS wild type, MSI stable, rectal and sigmoid primary, found to have peritoneal disease at time of initial staging.  She is s/p FOLFIRINOX and Avastin originally, irinotecan dropped for toxicity. She is s/p FOLFOX avastin x6 months. Avastin held 2/2 HTN and she is now on xeloda alone.\par \par Patient underwent resection for the 2 primaries as well as debulking in Oct 2018, with Dr. Parikh at [place].  Pathology revealed, complete response in rectum but residual moderately differentiated adenocarcinoma of sigmoid, omentum positive for adeno and right ovary positive for metastatic colon cancer . No perforation was noted but pos for perineural invasion. \par Rectum ypT1No\par Sigmoid ypT3No M1c \par \par Patient has been YOLIS , colonoscopy may 2019 one polyp. \par \par 2/2020 CT Imaging negative for mets, + fatty liver feb 2020. \par \par Cea is 3.8 which is up in may \par \par 6/2020 CT Imaging showed slight increase in adenopathy 8 mm to 1.1 cm \par \par 7/2020 PETCT - no uptake in nodes in retroperitoneum \par \par Foundation testing shows BRCA 2 mutation and also p53 mutation \par \par [facility] - RODOLFO mutation x2(2 diff heterogenous mutations uncertain sig ) , MSH6 heterogenous (undetermined sig) \par \par 10/2020 Mammogram done at [facility] in [place]  was negative\par \par 11/2020 CT Imaging - stable RP nodes , new multiple nodules up to 4 mm in lungs bilaterally , compared to feb 2020 \par \par 2/2021 CT CAP - revealing for progression of pulmonary metastases with slight increase in size and number of previously noted lung nodules, right lower lobe 6 mm up from 3 mm left upper lobe 5 mm up from 3 mm left lower lobe 6 mm up from 3 mm, the previously described retroperitoneal lymphadenopathy is stable at 1.1 cm there are no other areas of disease.\par \par -Patient with progression on Xeloda and Avastin, with worsening lung nodules on the scan in February 2021 switched to  oxaliplatin, Xeloda and Avastin\par -Cycle 2 of oxaliplatin was stopped after less than half of the infusion due to allergic reaction( 3/24/21) \par -then on irinotecan every 3 weeks ( xeloda dc due to toxicity ) \par -CT scan reviewed shows some slight progression in lung nodules but otherwise stable\par  was continued on the irinotecan and Avasti\par \par \par Gen path testing 10/19 : BOBBY. No mutations identified\par Foundation testing--7/1/2020----liquid bx -- somatic  BRCA2 splice site mutation and TP53 mutation. Her2 not amplified\par  [facility] testing does not indicate a BRCA germline, but did find 2 RODOLFO and one MSH6. (all are VUS) \par 6/17/22: Her2 FISH negative\par \par CT scan of the chest abdomen and pelvis done March 14, 2022 at [facility] shows some slight progression in lung nodules including the left upper lobe measuring 1 x 0.7 cm previously 0.8 x 0.6 left lower lobe measuring 1.1 x 0.8 previously 0.8 x 0.6 in the right lower lobe lung nodule 1.2 x 1 cm previously 1 x 0.9.Retroperitoneal adenopathy is stable there are no new areas of disease.\par Imaging consistent with slowly progressive disease, she was changed to [facility] in March 2022.  [de-identified] : She presents for chemotherapy with panitumumab with the addition of irinotecan depending on her counts today.  She reports having tolerated panitumumab well however she has an acneform rash on her facies with mucositis and loose bowel movements.  She was prescribed clindamycin and hydrocortisone with improvement in her skin.  The frequency of the bowel movements has gone down however they still are loose.  \par  \par

## 2022-12-01 ENCOUNTER — RESULT REVIEW (OUTPATIENT)
Age: 62
End: 2022-12-01

## 2022-12-01 ENCOUNTER — APPOINTMENT (OUTPATIENT)
Dept: HEMATOLOGY ONCOLOGY | Facility: CLINIC | Age: 62
End: 2022-12-01

## 2022-12-01 VITALS
HEIGHT: 62 IN | WEIGHT: 152 LBS | TEMPERATURE: 99 F | SYSTOLIC BLOOD PRESSURE: 112 MMHG | OXYGEN SATURATION: 100 % | HEART RATE: 74 BPM | BODY MASS INDEX: 27.97 KG/M2 | DIASTOLIC BLOOD PRESSURE: 75 MMHG | RESPIRATION RATE: 18 BRPM

## 2022-12-01 DIAGNOSIS — K12.30 ORAL MUCOSITIS (ULCERATIVE), UNSPECIFIED: ICD-10-CM

## 2022-12-01 PROCEDURE — 36415 COLL VENOUS BLD VENIPUNCTURE: CPT

## 2022-12-01 PROCEDURE — 99213 OFFICE O/P EST LOW 20 MIN: CPT | Mod: 25

## 2022-12-07 PROBLEM — K12.30 MUCOSITIS: Status: ACTIVE | Noted: 2022-11-17

## 2022-12-07 NOTE — HISTORY OF PRESENT ILLNESS
[0 - No Distress] : Distress Level: 0 [de-identified] : This is a 62 year old woman with a history of stage 4 colon cancer, KRAS wild type, MSI stable, rectal and sigmoid primary, found to have peritoneal disease at time of initial staging.  She is s/p FOLFIRINOX and Avastin originally, irinotecan dropped for toxicity. She is s/p FOLFOX avastin x6 months. Avastin held 2/2 HTN and she is now on xeloda alone.\par \par Patient underwent resection for the 2 primaries as well as debulking in Oct 2018, with Dr. Parikh at [place].  Pathology revealed, complete response in rectum but residual moderately differentiated adenocarcinoma of sigmoid, omentum positive for adeno and right ovary positive for metastatic colon cancer . No perforation was noted but pos for perineural invasion. \par Rectum ypT1No\par Sigmoid ypT3No M1c \par \par Patient has been YOLIS , colonoscopy may 2019 one polyp. \par \par 2/2020 CT Imaging negative for mets, + fatty liver feb 2020. \par \par Cea is 3.8 which is up in may \par \par 6/2020 CT Imaging showed slight increase in adenopathy 8 mm to 1.1 cm \par \par 7/2020 PETCT - no uptake in nodes in retroperitoneum \par \par Foundation testing shows BRCA 2 mutation and also p53 mutation \par \par [facility] - RODOLFO mutation x2(2 diff heterogenous mutations uncertain sig ) , MSH6 heterogenous (undetermined sig) \par \par 10/2020 Mammogram done at [facility] in [place]  was negative\par \par 11/2020 CT Imaging - stable RP nodes , new multiple nodules up to 4 mm in lungs bilaterally , compared to feb 2020 \par \par 2/2021 CT CAP - revealing for progression of pulmonary metastases with slight increase in size and number of previously noted lung nodules, right lower lobe 6 mm up from 3 mm left upper lobe 5 mm up from 3 mm left lower lobe 6 mm up from 3 mm, the previously described retroperitoneal lymphadenopathy is stable at 1.1 cm there are no other areas of disease.\par \par -Patient with progression on Xeloda and Avastin, with worsening lung nodules on the scan in February 2021 switched to  oxaliplatin, Xeloda and Avastin\par -Cycle 2 of oxaliplatin was stopped after less than half of the infusion due to allergic reaction( 3/24/21) \par -then on irinotecan every 3 weeks ( xeloda dc due to toxicity ) \par -CT scan reviewed shows some slight progression in lung nodules but otherwise stable\par  was continued on the irinotecan and Avasti\par \par \par Gen path testing 10/19 : BOBBY. No mutations identified\par Foundation testing--7/1/2020----liquid bx -- somatic  BRCA2 splice site mutation and TP53 mutation. Her2 not amplified\par  [facility] testing does not indicate a BRCA germline, but did find 2 RODOLFO and one MSH6. (all are VUS) \par 6/17/22: Her2 FISH negative\par \par CT scan of the chest abdomen and pelvis done March 14, 2022 at [facility] shows some slight progression in lung nodules including the left upper lobe measuring 1 x 0.7 cm previously 0.8 x 0.6 left lower lobe measuring 1.1 x 0.8 previously 0.8 x 0.6 in the right lower lobe lung nodule 1.2 x 1 cm previously 1 x 0.9.Retroperitoneal adenopathy is stable there are no new areas of disease.\par Imaging consistent with slowly progressive disease, she was changed to [facility] in March 2022.  [de-identified] : + diarrhea\par \par  \par

## 2022-12-07 NOTE — PHYSICAL EXAM
[Restricted in physically strenuous activity but ambulatory and able to carry out work of a light or sedentary nature] : Status 1- Restricted in physically strenuous activity but ambulatory and able to carry out work of a light or sedentary nature, e.g., light house work, office work [Obese] : obese [Normal] : grossly intact [de-identified] : colostomy with  hernia , non tender no masses  [de-identified] : acneiform rash over the cheeks [de-identified] : very anxious

## 2022-12-07 NOTE — ASSESSMENT
[FreeTextEntry1] :  62 year old woman with metastatic colorectal cancer, s/p hemicolectomy and debulking procedure. KRAS, NRAS, BRAF wildtype, MSI stable s/p FOLFOX (neoadjuvant)  \par She was switched to Xeloda/Avastin with noted POD and transitioned to oxaliplatin, Xeloda and Avastin (2/2021). Oxaliplatin was discontinued at C2 2/2 to allergic reaction (3/24/21) and switched to irinotecan. Xeloda was held due to toxicity\par Repeat CT scan of the chest abdomen pelvis in November 2021 shows some slight increase in size of the lung nodules but overall stability.  No evidence of any other metastatic disease.  Avastin has been on hold due to\par Dental procedures and colonoscopy.   CEA began to rise so Xeloda/Avastin was restarted in 12/2021. \par \par Gen path testing 10/19 : BOBBY. No mutations identified\par Foundation testing--7/1/2020----liquid bx -- somatic  BRCA2 splice site mutation and TP53 mutation. Her2 not amplified\par  [facility] testing does not indicate a BRCA germline, but did find 2 RODOLFO and one MSH6. (all are VUS) \par 6/17/22: Her2 FISH negative\par \par CT scan of the chest abdomen and pelvis done March 14, 2022 at [facility] shows some slight progression in lung nodules including the left upper lobe measuring 1 x 0.7 cm previously 0.8 x 0.6 left lower lobe measuring 1.1 x 0.8 previously 0.8 x 0.6 in the right lower lobe lung nodule 1.2 x 1 cm previously 1 x 0.9.Retroperitoneal adenopathy is stable there are no new areas of disease.\par Imaging consistent with slowly progressive disease, she was changed to [facility] in March 2022. \par \par 9/26/22 CT CAP imaging which revealed L para-aortic/L psoas mass is slightly increased in size measuring 4 x 2.5 cm. Additionally there is new bony destruction of the adjacent L3 vertebral body measuring 1.5 x 1.8 cm. There is a 1.3 x 1.5 cm subtle are of liver enhancement adjacent to the gallbladder. This is somewhat more pronounced when compared to prior study it is unclear if this represents a true underlying lesion versus perfusion anomaly. Pulmonary nodules are stable to slightly smaller compared to prior study. \par \par Panitumumab started 10/20/22\par \par Plan:\par \par For panituumab.and irinotecan\par G2 acneiform rash --- add minocycline to hydrocortisone\par Dexamethasone mouth rinse for mucositis\par Dental clearence and denosumab\par To consider olaparib in the future ( BRCA2 splice site mutationon liquid bx)\par \par \par

## 2022-12-15 ENCOUNTER — RESULT REVIEW (OUTPATIENT)
Age: 62
End: 2022-12-15

## 2022-12-15 ENCOUNTER — APPOINTMENT (OUTPATIENT)
Dept: HEMATOLOGY ONCOLOGY | Facility: CLINIC | Age: 62
End: 2022-12-15

## 2022-12-15 VITALS
TEMPERATURE: 98.7 F | DIASTOLIC BLOOD PRESSURE: 73 MMHG | RESPIRATION RATE: 18 BRPM | HEART RATE: 80 BPM | OXYGEN SATURATION: 100 % | BODY MASS INDEX: 27.79 KG/M2 | HEIGHT: 62 IN | SYSTOLIC BLOOD PRESSURE: 149 MMHG | WEIGHT: 151 LBS

## 2022-12-15 PROCEDURE — 99213 OFFICE O/P EST LOW 20 MIN: CPT

## 2022-12-25 NOTE — HISTORY OF PRESENT ILLNESS
[0 - No Distress] : Distress Level: 0 [de-identified] : This is a 62 year old woman with a history of stage 4 colon cancer, KRAS wild type, MSI stable, rectal and sigmoid primary, found to have peritoneal disease at time of initial staging.  She is s/p FOLFIRINOX and Avastin originally, irinotecan dropped for toxicity. She is s/p FOLFOX avastin x6 months. Avastin held 2/2 HTN and she is now on xeloda alone.\par \par Patient underwent resection for the 2 primaries as well as debulking in Oct 2018, with Dr. Parikh at [place].  Pathology revealed, complete response in rectum but residual moderately differentiated adenocarcinoma of sigmoid, omentum positive for adeno and right ovary positive for metastatic colon cancer . No perforation was noted but pos for perineural invasion. \par Rectum ypT1No\par Sigmoid ypT3No M1c \par \par Patient has been YOLIS , colonoscopy may 2019 one polyp. \par \par 2/2020 CT Imaging negative for mets, + fatty liver feb 2020. \par \par Cea is 3.8 which is up in may \par \par 6/2020 CT Imaging showed slight increase in adenopathy 8 mm to 1.1 cm \par \par 7/2020 PETCT - no uptake in nodes in retroperitoneum \par \par Foundation testing shows BRCA 2 mutation and also p53 mutation \par \par [facility] - RODOLFO mutation x2(2 diff heterogenous mutations uncertain sig ) , MSH6 heterogenous (undetermined sig) \par \par 10/2020 Mammogram done at [facility] in [place]  was negative\par \par 11/2020 CT Imaging - stable RP nodes , new multiple nodules up to 4 mm in lungs bilaterally , compared to feb 2020 \par \par 2/2021 CT CAP - revealing for progression of pulmonary metastases with slight increase in size and number of previously noted lung nodules, right lower lobe 6 mm up from 3 mm left upper lobe 5 mm up from 3 mm left lower lobe 6 mm up from 3 mm, the previously described retroperitoneal lymphadenopathy is stable at 1.1 cm there are no other areas of disease.\par \par -Patient with progression on Xeloda and Avastin, with worsening lung nodules on the scan in February 2021 switched to  oxaliplatin, Xeloda and Avastin\par -Cycle 2 of oxaliplatin was stopped after less than half of the infusion due to allergic reaction( 3/24/21) \par -then on irinotecan every 3 weeks ( xeloda dc due to toxicity ) \par -CT scan reviewed shows some slight progression in lung nodules but otherwise stable\par  was continued on the irinotecan and Avasti\par \par \par Gen path testing 10/19 : BOBBY. No mutations identified\par Foundation testing--7/1/2020----liquid bx -- somatic  BRCA2 splice site mutation and TP53 mutation. Her2 not amplified\par  [facility] testing does not indicate a BRCA germline, but did find 2 RODOLFO and one MSH6. (all are VUS) \par 6/17/22: Her2 FISH negative\par \par CT scan of the chest abdomen and pelvis done March 14, 2022 at [facility] shows some slight progression in lung nodules including the left upper lobe measuring 1 x 0.7 cm previously 0.8 x 0.6 left lower lobe measuring 1.1 x 0.8 previously 0.8 x 0.6 in the right lower lobe lung nodule 1.2 x 1 cm previously 1 x 0.9.Retroperitoneal adenopathy is stable there are no new areas of disease.\par Imaging consistent with slowly progressive disease, she was changed to [facility] in March 2022.  [de-identified] : She presents for chemotherapy with panitumumab with the addition of irinotecan. She is c/of acneform rash on her facies and striae/xeroderma. She continues to have loose BMs in her bag. she has been taking Loperamide but only one daily. \par  \par

## 2022-12-25 NOTE — PHYSICAL EXAM
[Restricted in physically strenuous activity but ambulatory and able to carry out work of a light or sedentary nature] : Status 1- Restricted in physically strenuous activity but ambulatory and able to carry out work of a light or sedentary nature, e.g., light house work, office work [Obese] : obese [Normal] : grossly intact [de-identified] : colostomy with  hernia , non tender no masses  [de-identified] : acneiform rash over the cheeks [de-identified] : very anxious

## 2022-12-25 NOTE — ASSESSMENT
[FreeTextEntry1] :  62 year old woman with metastatic colorectal cancer, s/p hemicolectomy and debulking procedure. KRAS, NRAS, BRAF wildtype, MSI stable s/p FOLFOX (neoadjuvant)  \par She was switched to Xeloda/Avastin with noted POD and transitioned to oxaliplatin, Xeloda and Avastin (2/2021). Oxaliplatin was discontinued at C2 2/2 to allergic reaction (3/24/21) and switched to irinotecan. Xeloda was held due to toxicity\par Repeat CT scan of the chest abdomen pelvis in November 2021 shows some slight increase in size of the lung nodules but overall stability.  No evidence of any other metastatic disease.  Avastin has been on hold due to\par Dental procedures and colonoscopy.   CEA began to rise so Xeloda/Avastin was restarted in 12/2021. \par \par Gen path testing 10/19 : BOBBY. No mutations identified\par Foundation testing--7/1/2020----liquid bx -- somatic  BRCA2 splice site mutation and TP53 mutation. Her2 not amplified\par  [facility] testing does not indicate a BRCA germline, but did find 2 RODOLFO and one MSH6. (all are VUS) \par 6/17/22: Her2 FISH negative\par \par CT scan of the chest abdomen and pelvis done March 14, 2022 at [facility] shows some slight progression in lung nodules including the left upper lobe measuring 1 x 0.7 cm previously 0.8 x 0.6 left lower lobe measuring 1.1 x 0.8 previously 0.8 x 0.6 in the right lower lobe lung nodule 1.2 x 1 cm previously 1 x 0.9.Retroperitoneal adenopathy is stable there are no new areas of disease.\par Imaging consistent with slowly progressive disease, she was changed to [facility] in March 2022. \par \par 9/26/22 CT CAP imaging which revealed L para-aortic/L psoas mass is slightly increased in size measuring 4 x 2.5 cm. Additionally there is new bony destruction of the adjacent L3 vertebral body measuring 1.5 x 1.8 cm. There is a 1.3 x 1.5 cm subtle are of liver enhancement adjacent to the gallbladder. This is somewhat more pronounced when compared to prior study it is unclear if this represents a true underlying lesion versus perfusion anomaly. Pulmonary nodules are stable to slightly smaller compared to prior study. \par \par Panitumumab started 10/20/22\par \par Plan:\par Give panituumab.and hold irinotecan\par G2 acneiform rash --- added minocycline to hydrocortisone, moisturizer for xeroderma\par Dexamethasone mouth rinse for mucositis\par Reviewed use of Loperamide and addition of Lomotil\par To consider olaparib in the future ( BRCA2 splice site mutation on liquid bx)\par Continue routine, age-appropriate, healthcare maintenance \par History of present illness, review of systems, physical exam and treatment plan reviewed with Dr. Verito Martin\par Office visit in 2 weeks or prn for new or worsening symptoms. \par \par \par

## 2022-12-29 ENCOUNTER — RESULT REVIEW (OUTPATIENT)
Age: 62
End: 2022-12-29

## 2022-12-29 ENCOUNTER — APPOINTMENT (OUTPATIENT)
Dept: HEMATOLOGY ONCOLOGY | Facility: CLINIC | Age: 62
End: 2022-12-29
Payer: COMMERCIAL

## 2022-12-29 VITALS
OXYGEN SATURATION: 100 % | DIASTOLIC BLOOD PRESSURE: 66 MMHG | WEIGHT: 151 LBS | HEIGHT: 62 IN | HEART RATE: 83 BPM | BODY MASS INDEX: 27.79 KG/M2 | RESPIRATION RATE: 18 BRPM | SYSTOLIC BLOOD PRESSURE: 103 MMHG | TEMPERATURE: 97.6 F

## 2022-12-29 PROCEDURE — 36415 COLL VENOUS BLD VENIPUNCTURE: CPT

## 2022-12-29 PROCEDURE — 99213 OFFICE O/P EST LOW 20 MIN: CPT | Mod: 25

## 2022-12-29 RX ORDER — CLINDAMYCIN PHOSPHATE 1 G/10ML
1 GEL TOPICAL TWICE DAILY
Qty: 1 | Refills: 1 | Status: DISCONTINUED | COMMUNITY
Start: 2022-11-04 | End: 2022-12-29

## 2023-01-02 NOTE — HISTORY OF PRESENT ILLNESS
[0 - No Distress] : Distress Level: 0 [de-identified] : This is a 62 year old woman with a history of stage 4 colon cancer, KRAS wild type, MSI stable, rectal and sigmoid primary, found to have peritoneal disease at time of initial staging.  She is s/p FOLFIRINOX and Avastin originally, irinotecan dropped for toxicity. She is s/p FOLFOX avastin x6 months. Avastin held 2/2 HTN and she is now on xeloda alone.\par \par Patient underwent resection for the 2 primaries as well as debulking in Oct 2018, with Dr. Parikh at [place].  Pathology revealed, complete response in rectum but residual moderately differentiated adenocarcinoma of sigmoid, omentum positive for adeno and right ovary positive for metastatic colon cancer . No perforation was noted but pos for perineural invasion. \par Rectum ypT1No\par Sigmoid ypT3No M1c \par \par Patient has been YOLIS , colonoscopy may 2019 one polyp. \par \par 2/2020 CT Imaging negative for mets, + fatty liver feb 2020. \par \par Cea is 3.8 which is up in may \par \par 6/2020 CT Imaging showed slight increase in adenopathy 8 mm to 1.1 cm \par \par 7/2020 PETCT - no uptake in nodes in retroperitoneum \par \par Foundation testing shows BRCA 2 mutation and also p53 mutation \par \par [facility] - RODOLFO mutation x2(2 diff heterogenous mutations uncertain sig ) , MSH6 heterogenous (undetermined sig) \par \par 10/2020 Mammogram done at [facility] in [place]  was negative\par \par 11/2020 CT Imaging - stable RP nodes , new multiple nodules up to 4 mm in lungs bilaterally , compared to feb 2020 \par \par 2/2021 CT CAP - revealing for progression of pulmonary metastases with slight increase in size and number of previously noted lung nodules, right lower lobe 6 mm up from 3 mm left upper lobe 5 mm up from 3 mm left lower lobe 6 mm up from 3 mm, the previously described retroperitoneal lymphadenopathy is stable at 1.1 cm there are no other areas of disease.\par \par -Patient with progression on Xeloda and Avastin, with worsening lung nodules on the scan in February 2021 switched to  oxaliplatin, Xeloda and Avastin\par -Cycle 2 of oxaliplatin was stopped after less than half of the infusion due to allergic reaction( 3/24/21) \par -then on irinotecan every 3 weeks ( xeloda dc due to toxicity ) \par -CT scan reviewed shows some slight progression in lung nodules but otherwise stable\par  was continued on the irinotecan and Avasti\par \par \par Gen path testing 10/19 : BOBBY. No mutations identified\par Foundation testing--7/1/2020----liquid bx -- somatic  BRCA2 splice site mutation and TP53 mutation. Her2 not amplified\par  [facility] testing does not indicate a BRCA germline, but did find 2 RODOLFO and one MSH6. (all are VUS) \par 6/17/22: Her2 FISH negative\par \par CT scan of the chest abdomen and pelvis done March 14, 2022 at [facility] shows some slight progression in lung nodules including the left upper lobe measuring 1 x 0.7 cm previously 0.8 x 0.6 left lower lobe measuring 1.1 x 0.8 previously 0.8 x 0.6 in the right lower lobe lung nodule 1.2 x 1 cm previously 1 x 0.9.Retroperitoneal adenopathy is stable there are no new areas of disease.\par Imaging consistent with slowly progressive disease, she was changed to [facility] in March 2022.  [de-identified] : Diarrhea and rash much improved\par Has not been using dexamethasone mouth rinse for mucositis\par \par  \par

## 2023-01-02 NOTE — PHYSICAL EXAM
[Restricted in physically strenuous activity but ambulatory and able to carry out work of a light or sedentary nature] : Status 1- Restricted in physically strenuous activity but ambulatory and able to carry out work of a light or sedentary nature, e.g., light house work, office work [Obese] : obese [Normal] : grossly intact [de-identified] : colostomy with  hernia , non tender no masses  [de-identified] : acneiform rash over the cheeks [de-identified] : very anxious

## 2023-01-02 NOTE — ASSESSMENT
[FreeTextEntry1] :  62 year old woman with metastatic colorectal cancer, s/p hemicolectomy and debulking procedure. KRAS, NRAS, BRAF wildtype, MSI stable s/p FOLFOX (neoadjuvant)  \par She was switched to Xeloda/Avastin with noted POD and transitioned to oxaliplatin, Xeloda and Avastin (2/2021). Oxaliplatin was discontinued at C2 2/2 to allergic reaction (3/24/21) and switched to irinotecan. Xeloda was held due to toxicity\par Repeat CT scan of the chest abdomen pelvis in November 2021 shows some slight increase in size of the lung nodules but overall stability.  No evidence of any other metastatic disease.  Avastin has been on hold due to\par Dental procedures and colonoscopy.   CEA began to rise so Xeloda/Avastin was restarted in 12/2021. \par \par Gen path testing 10/19 : BOBBY. No mutations identified\par Foundation testing--7/1/2020----liquid bx -- somatic  BRCA2 splice site mutation and TP53 mutation. Her2 not amplified\par  [facility] testing does not indicate a BRCA germline, but did find 2 RODOLFO and one MSH6. (all are VUS) \par 6/17/22: Her2 FISH negative\par \par CT scan of the chest abdomen and pelvis done March 14, 2022 at [facility] shows some slight progression in lung nodules including the left upper lobe measuring 1 x 0.7 cm previously 0.8 x 0.6 left lower lobe measuring 1.1 x 0.8 previously 0.8 x 0.6 in the right lower lobe lung nodule 1.2 x 1 cm previously 1 x 0.9.Retroperitoneal adenopathy is stable there are no new areas of disease.\par Imaging consistent with slowly progressive disease, she was changed to [facility] in March 2022. \par \par 9/26/22 CT CAP imaging which revealed L para-aortic/L psoas mass is slightly increased in size measuring 4 x 2.5 cm. Additionally there is new bony destruction of the adjacent L3 vertebral body measuring 1.5 x 1.8 cm. There is a 1.3 x 1.5 cm subtle are of liver enhancement adjacent to the gallbladder. This is somewhat more pronounced when compared to prior study it is unclear if this represents a true underlying lesion versus perfusion anomaly. Pulmonary nodules are stable to slightly smaller compared to prior study. \par \par Panitumumab started 10/20/22\par \par Plan:\par \par For panituumab.and irinotecan\par acneiform rash -- G1. Stop minocycline. Hydrocortisone 2/5% + clinda gel\par Dexamethasone mouth rinse for mucositis----rediscussed this in detail \par Dental clearence and denosumab\par To consider olaparib in the future ( BRCA2 splice site mutationon liquid bx)\par \par \par

## 2023-01-04 ENCOUNTER — NON-APPOINTMENT (OUTPATIENT)
Age: 63
End: 2023-01-04

## 2023-01-12 ENCOUNTER — APPOINTMENT (OUTPATIENT)
Dept: HEMATOLOGY ONCOLOGY | Facility: CLINIC | Age: 63
End: 2023-01-12
Payer: COMMERCIAL

## 2023-01-12 ENCOUNTER — RESULT REVIEW (OUTPATIENT)
Age: 63
End: 2023-01-12

## 2023-01-12 VITALS
HEIGHT: 61 IN | WEIGHT: 148 LBS | HEART RATE: 97 BPM | SYSTOLIC BLOOD PRESSURE: 113 MMHG | DIASTOLIC BLOOD PRESSURE: 75 MMHG | RESPIRATION RATE: 18 BRPM | BODY MASS INDEX: 27.94 KG/M2 | TEMPERATURE: 99.9 F | OXYGEN SATURATION: 97 %

## 2023-01-12 PROCEDURE — 99213 OFFICE O/P EST LOW 20 MIN: CPT

## 2023-01-16 NOTE — REVIEW OF SYSTEMS
[Fatigue] : fatigue [Abdominal Pain] : abdominal pain [Constipation] : constipation [Muscle Pain] : muscle pain [Negative] : Allergic/Immunologic [Diarrhea] : diarrhea [FreeTextEntry7] : abdominal cramps [FreeTextEntry9] : +lower back pain

## 2023-01-16 NOTE — HISTORY OF PRESENT ILLNESS
[0 - No Distress] : Distress Level: 0 [de-identified] : This is a 62 year old woman with a history of stage 4 colon cancer, KRAS wild type, MSI stable, rectal and sigmoid primary, found to have peritoneal disease at time of initial staging.  She is s/p FOLFIRINOX and Avastin originally, irinotecan dropped for toxicity. She is s/p FOLFOX avastin x6 months. Avastin held 2/2 HTN and she is now on xeloda alone.\par \par Patient underwent resection for the 2 primaries as well as debulking in Oct 2018, with Dr. Parikh at [place].  Pathology revealed, complete response in rectum but residual moderately differentiated adenocarcinoma of sigmoid, omentum positive for adeno and right ovary positive for metastatic colon cancer . No perforation was noted but pos for perineural invasion. \par Rectum ypT1No\par Sigmoid ypT3No M1c \par \par Patient has been YOLIS , colonoscopy may 2019 one polyp. \par \par 2/2020 CT Imaging negative for mets, + fatty liver feb 2020. \par \par Cea is 3.8 which is up in may \par \par 6/2020 CT Imaging showed slight increase in adenopathy 8 mm to 1.1 cm \par \par 7/2020 PETCT - no uptake in nodes in retroperitoneum \par \par Foundation testing shows BRCA 2 mutation and also p53 mutation \par \par [facility] - RODOLFO mutation x2(2 diff heterogenous mutations uncertain sig ) , MSH6 heterogenous (undetermined sig) \par \par 10/2020 Mammogram done at [facility] in [place]  was negative\par \par 11/2020 CT Imaging - stable RP nodes , new multiple nodules up to 4 mm in lungs bilaterally , compared to feb 2020 \par \par 2/2021 CT CAP - revealing for progression of pulmonary metastases with slight increase in size and number of previously noted lung nodules, right lower lobe 6 mm up from 3 mm left upper lobe 5 mm up from 3 mm left lower lobe 6 mm up from 3 mm, the previously described retroperitoneal lymphadenopathy is stable at 1.1 cm there are no other areas of disease.\par \par -Patient with progression on Xeloda and Avastin, with worsening lung nodules on the scan in February 2021 switched to  oxaliplatin, Xeloda and Avastin\par -Cycle 2 of oxaliplatin was stopped after less than half of the infusion due to allergic reaction( 3/24/21) \par -then on irinotecan every 3 weeks ( xeloda dc due to toxicity ) \par -CT scan reviewed shows some slight progression in lung nodules but otherwise stable\par  was continued on the irinotecan and Avasti\par \par \par Gen path testing 10/19 : BOBBY. No mutations identified\par Foundation testing--7/1/2020----liquid bx -- somatic  BRCA2 splice site mutation and TP53 mutation. Her2 not amplified\par  [facility] testing does not indicate a BRCA germline, but did find 2 RODOLFO and one MSH6. (all are VUS) \par 6/17/22: Her2 FISH negative\par \par CT scan of the chest abdomen and pelvis done March 14, 2022 at [facility] shows some slight progression in lung nodules including the left upper lobe measuring 1 x 0.7 cm previously 0.8 x 0.6 left lower lobe measuring 1.1 x 0.8 previously 0.8 x 0.6 in the right lower lobe lung nodule 1.2 x 1 cm previously 1 x 0.9.Retroperitoneal adenopathy is stable there are no new areas of disease.\par Imaging consistent with slowly progressive disease, she was changed to [facility] in March 2022.  [de-identified] : She presents for follow up of Met Colorectal Cancer on Panitumumab with irinotecan held secondary to diarrhea. Her diarrhea and rash have improved. She will be needing dental extractions in the future. She is scheduled for a CT on Monday. She will also be scheduled for a colonoscopy with . Her CEA has come down to normal range. \par \par \par  \par

## 2023-01-16 NOTE — ASSESSMENT
[FreeTextEntry1] :  62 year old woman with metastatic colorectal cancer, s/p hemicolectomy and debulking procedure. KRAS, NRAS, BRAF wildtype, MSI stable s/p FOLFOX (neoadjuvant)  \par She was switched to Xeloda/Avastin with noted POD and transitioned to oxaliplatin, Xeloda and Avastin (2/2021). Oxaliplatin was discontinued at C2 2/2 to allergic reaction (3/24/21) and switched to irinotecan. Xeloda was held due to toxicity\par Repeat CT scan of the chest abdomen pelvis in November 2021 shows some slight increase in size of the lung nodules but overall stability.  No evidence of any other metastatic disease.  Avastin has been on hold due to\par Dental procedures and colonoscopy.   CEA began to rise so Xeloda/Avastin was restarted in 12/2021. \par \par Gen path testing 10/19 : BOBBY. No mutations identified\par Foundation testing--7/1/2020----liquid bx -- somatic  BRCA2 splice site mutation and TP53 mutation. Her2 not amplified\par  [facility] testing does not indicate a BRCA germline, but did find 2 RODOLFO and one MSH6. (all are VUS) \par 6/17/22: Her2 FISH negative\par \par CT scan of the chest abdomen and pelvis done March 14, 2022 at [facility] shows some slight progression in lung nodules including the left upper lobe measuring 1 x 0.7 cm previously 0.8 x 0.6 left lower lobe measuring 1.1 x 0.8 previously 0.8 x 0.6 in the right lower lobe lung nodule 1.2 x 1 cm previously 1 x 0.9.Retroperitoneal adenopathy is stable there are no new areas of disease.\par Imaging consistent with slowly progressive disease, she was changed to [facility] in March 2022. \par \par 9/26/22 CT CAP imaging which revealed L para-aortic/L psoas mass is slightly increased in size measuring 4 x 2.5 cm. Additionally there is new bony destruction of the adjacent L3 vertebral body measuring 1.5 x 1.8 cm. There is a 1.3 x 1.5 cm subtle are of liver enhancement adjacent to the gallbladder. This is somewhat more pronounced when compared to prior study it is unclear if this represents a true underlying lesion versus perfusion anomaly. Pulmonary nodules are stable to slightly smaller compared to prior study. \par \par Panitumumab started 10/20/22\par \par Plan:\par \par For panituumab, hold irinotecan\par acneiform rash  G1, improved. Stop minocycline. Hydrocortisone 2/5% + clinda gel\par Dental clearance, needs extractions and denosumab\par To consider olaparib in the future ( BRCA2 splice site mutationon liquid bx)\par diarrhea- she is once again urged to use Loperamide more frequently than once a day\par Reviewed  abs- delay in chem secondary to fibrin in specimen. Peripheral draw next time. \par Appt with , palliative med pending. \par Continue routine, age-appropriate, healthcare maintenance \par History of present illness, review of systems, physical exam and treatment plan reviewed with Dr. Verito Martin\par Office visit in 2 weeks or prn for new or worsening symptoms. \par \par

## 2023-01-16 NOTE — PHYSICAL EXAM
[Restricted in physically strenuous activity but ambulatory and able to carry out work of a light or sedentary nature] : Status 1- Restricted in physically strenuous activity but ambulatory and able to carry out work of a light or sedentary nature, e.g., light house work, office work [Obese] : obese [Normal] : grossly intact [de-identified] : colostomy with  hernia , non tender no masses  [de-identified] : acneiform rash over the cheeks [de-identified] : very anxious

## 2023-01-19 ENCOUNTER — APPOINTMENT (OUTPATIENT)
Dept: GERIATRICS | Facility: CLINIC | Age: 63
End: 2023-01-19

## 2023-01-19 LAB — CEA SERPL-MCNC: 3.5 NG/ML

## 2023-01-26 ENCOUNTER — RESULT REVIEW (OUTPATIENT)
Age: 63
End: 2023-01-26

## 2023-01-26 ENCOUNTER — APPOINTMENT (OUTPATIENT)
Dept: HEMATOLOGY ONCOLOGY | Facility: CLINIC | Age: 63
End: 2023-01-26
Payer: COMMERCIAL

## 2023-01-26 VITALS
HEIGHT: 61 IN | HEART RATE: 83 BPM | SYSTOLIC BLOOD PRESSURE: 110 MMHG | OXYGEN SATURATION: 98 % | WEIGHT: 152 LBS | TEMPERATURE: 98 F | RESPIRATION RATE: 18 BRPM | BODY MASS INDEX: 28.7 KG/M2 | DIASTOLIC BLOOD PRESSURE: 68 MMHG

## 2023-01-26 PROCEDURE — 99213 OFFICE O/P EST LOW 20 MIN: CPT | Mod: 25

## 2023-01-26 PROCEDURE — 36415 COLL VENOUS BLD VENIPUNCTURE: CPT

## 2023-01-30 NOTE — ASSESSMENT
[FreeTextEntry1] : 62 year old woman with metastatic colorectal cancer, s/p hemicolectomy and debulking procedure. KRAS, NRAS, BRAF wildtype, MSI stable s/p FOLFOX (neoadjuvant)  \par \par She was switched to Xeloda/Avastin with noted POD and transitioned to oxaliplatin, Xeloda and Avastin (2/2021). Oxaliplatin was discontinued at C2 2/2 to allergic reaction (3/24/21) and switched to irinotecan. Xeloda was held due to toxicity\par \par Repeat CT scan of the chest abdomen pelvis in November 2021 shows some slight increase in size of the lung nodules but overall stability.  No evidence of any other metastatic disease.  Avastin has been on hold due to\par Dental procedures and colonoscopy.   CEA began to rise so Xeloda/Avastin was restarted in 12/2021. \par \par Gen path testing 10/19 : BOBBY. No mutations identified\par Foundation testing--7/1/2020----liquid bx -- somatic  BRCA2 splice site mutation and TP53 mutation. Her2 not amplified\par  [facility] testing does not indicate a BRCA germline, but did find 2 RODOLFO and one MSH6. (all are VUS) \par 6/17/22: Her2 FISH negative\par \par CT scan of the chest abdomen and pelvis done March 14, 2022 at [facility] shows some slight progression in lung nodules including the left upper lobe measuring 1 x 0.7 cm previously 0.8 x 0.6 left lower lobe measuring 1.1 x 0.8 previously 0.8 x 0.6 in the right lower lobe lung nodule 1.2 x 1 cm previously 1 x 0.9.Retroperitoneal adenopathy is stable there are no new areas of disease.Imaging consistent with slowly progressive disease, she was changed to [facility] in March 2022. \par \par 9/26/22 CT CAP imaging which revealed L para-aortic/L psoas mass is slightly increased in size measuring 4 x 2.5 cm. Additionally there is new bony destruction of the adjacent L3 vertebral body measuring 1.5 x 1.8 cm. There is a 1.3 x 1.5 cm subtle are of liver enhancement adjacent to the gallbladder. This is somewhat more pronounced when compared to prior study it is unclear if this represents a true underlying lesion versus perfusion anomaly. Pulmonary nodules are stable to slightly smaller compared to prior study. \par \par Panitumumab started 10/20/22\par \par 1/16/23 CT CAP revealing no change in metastatic disease within the CAP. \par \par Plan:\par - Recent CT imaging reviewed with patient \par - Labs reviewed. Okay to proceed with treatment today.  Mg 1.5 today, will administer 1g mag sulfate IV today \par - Continue Panitumumab\par - Continue to hold  irinotecan 2/2 side effects of diarrhea. Recommend Immodium\par -  Acneiform rash  G1, improved. Stop minocycline. Hydrocortisone 2/5% + clinda gel\par - Dental clearance, needs extractions and denosumab\par - To consider olaparib in the future ( BRCA2 splice site mutationon liquid bx)\par Reviewed  abs- delay in chem secondary to fibrin in specimen. Peripheral draw next time. \par - Pending appt with \par - Continue routine, age-appropriate, healthcare maintenance \par - Office visit in 2 weeks prior to next cycle  \par - Advised to call office for new or worsening s/s\par

## 2023-01-30 NOTE — PHYSICAL EXAM
[Restricted in physically strenuous activity but ambulatory and able to carry out work of a light or sedentary nature] : Status 1- Restricted in physically strenuous activity but ambulatory and able to carry out work of a light or sedentary nature, e.g., light house work, office work [Obese] : obese [Normal] : grossly intact [de-identified] : colostomy with  hernia , non tender no masses  [de-identified] : acneiform rash over the cheeks [de-identified] : very anxious

## 2023-01-30 NOTE — HISTORY OF PRESENT ILLNESS
[0 - No Distress] : Distress Level: 0 [de-identified] : This is a 62 year old woman with a history of stage 4 colon cancer, KRAS wild type, MSI stable, rectal and sigmoid primary, found to have peritoneal disease at time of initial staging.  She is s/p FOLFIRINOX and Avastin originally, irinotecan dropped for toxicity. She is s/p FOLFOX avastin x6 months. Avastin held 2/2 HTN and she is now on xeloda alone.\par \par Patient underwent resection for the 2 primaries as well as debulking in Oct 2018, with Dr. Parikh at [facility].  Pathology revealed, complete response in rectum but residual moderately differentiated adenocarcinoma of sigmoid, omentum positive for adeno and right ovary positive for metastatic colon cancer . No perforation was noted but pos for perineural invasion. \par Rectum ypT1No\par Sigmoid ypT3No M1c \par \par Patient has been YOLIS , colonoscopy may 2019 one polyp. \par \par 2/2020 CT Imaging negative for mets, + fatty liver feb 2020. \par \par Cea is 3.8 which is up in may \par \par 6/2020 CT Imaging showed slight increase in adenopathy 8 mm to 1.1 cm \par \par 7/2020 PETCT - no uptake in nodes in retroperitoneum \par \par Foundation testing shows BRCA 2 mutation and also p53 mutation \par \par [facility] - RODOLFO mutation x2(2 diff heterogenous mutations uncertain sig ) , MSH6 heterogenous (undetermined sig) \par \par 10/2020 Mammogram done at [facility] in [place]  was negative\par \par 11/2020 CT Imaging - stable RP nodes , new multiple nodules up to 4 mm in lungs bilaterally , compared to feb 2020 \par \par 2/2021 CT CAP - revealing for progression of pulmonary metastases with slight increase in size and number of previously noted lung nodules, right lower lobe 6 mm up from 3 mm left upper lobe 5 mm up from 3 mm left lower lobe 6 mm up from 3 mm, the previously described retroperitoneal lymphadenopathy is stable at 1.1 cm there are no other areas of disease.\par \par -Patient with progression on Xeloda and Avastin, with worsening lung nodules on the scan in February 2021 switched to  oxaliplatin, Xeloda and Avastin\par -Cycle 2 of oxaliplatin was stopped after less than half of the infusion due to allergic reaction( 3/24/21) \par -then on irinotecan every 3 weeks ( xeloda dc due to toxicity ) \par -CT scan reviewed shows some slight progression in lung nodules but otherwise stable\par  was continued on the irinotecan and Avasti\par \par \par Gen path testing 10/19 : BOBBY. No mutations identified\par Foundation testing--7/1/2020----liquid bx -- somatic  BRCA2 splice site mutation and TP53 mutation. Her2 not amplified\par  [facility] testing does not indicate a BRCA germline, but did find 2 RODOLFO and one MSH6. (all are VUS) \par 6/17/22: Her2 FISH negative\par \par CT scan of the chest abdomen and pelvis done March 14, 2022 at [facility] shows some slight progression in lung nodules including the left upper lobe measuring 1 x 0.7 cm previously 0.8 x 0.6 left lower lobe measuring 1.1 x 0.8 previously 0.8 x 0.6 in the right lower lobe lung nodule 1.2 x 1 cm previously 1 x 0.9.Retroperitoneal adenopathy is stable there are no new areas of disease.\par Imaging consistent with slowly progressive disease, she was changed to [facility] in March 2022.  [de-identified] : Patient presents for routine follow up today for the management of metastatic CRC. \par She remains on Panitumumab due today. Irenotecan held. \par She is s/p CT CAP 1/16/23 revealing no change in metastatic disease within the CAP. \par She will also be scheduled for a colonoscopy with . \par Last CEA 12/29/22 3.5. \par \par CT c/a/p : 1/16/23: No change in metastatic diseae --multiple bilateral spiculated pulmonary nodules compatible with diffuse pulmonary metastasis. These are stable. Left psoas and retroperitoneal massis stable. Adjacent destruction of L3 vertebral body\par \par  \par

## 2023-01-30 NOTE — REVIEW OF SYSTEMS
[Fatigue] : fatigue [Abdominal Pain] : abdominal pain [Constipation] : constipation [Diarrhea] : diarrhea [Muscle Pain] : muscle pain [Negative] : Allergic/Immunologic [FreeTextEntry7] : abdominal cramps [FreeTextEntry9] : +lower back pain

## 2023-02-09 ENCOUNTER — APPOINTMENT (OUTPATIENT)
Dept: HEMATOLOGY ONCOLOGY | Facility: CLINIC | Age: 63
End: 2023-02-09
Payer: COMMERCIAL

## 2023-02-09 ENCOUNTER — RESULT REVIEW (OUTPATIENT)
Age: 63
End: 2023-02-09

## 2023-02-09 VITALS
BODY MASS INDEX: 27.56 KG/M2 | HEART RATE: 95 BPM | SYSTOLIC BLOOD PRESSURE: 92 MMHG | OXYGEN SATURATION: 98 % | HEIGHT: 61 IN | RESPIRATION RATE: 18 BRPM | TEMPERATURE: 99 F | WEIGHT: 146 LBS | DIASTOLIC BLOOD PRESSURE: 64 MMHG

## 2023-02-09 LAB — CEA SERPL-MCNC: 3.9 NG/ML

## 2023-02-09 PROCEDURE — 36415 COLL VENOUS BLD VENIPUNCTURE: CPT

## 2023-02-09 PROCEDURE — 99213 OFFICE O/P EST LOW 20 MIN: CPT | Mod: 25

## 2023-02-10 ENCOUNTER — NON-APPOINTMENT (OUTPATIENT)
Age: 63
End: 2023-02-10

## 2023-02-13 NOTE — HISTORY OF PRESENT ILLNESS
[0 - No Distress] : Distress Level: 0 [de-identified] : This is a 62 year old woman with a history of stage 4 colon cancer, KRAS wild type, MSI stable, rectal and sigmoid primary, found to have peritoneal disease at time of initial staging.  She is s/p FOLFIRINOX and Avastin originally, irinotecan dropped for toxicity. She is s/p FOLFOX avastin x6 months. Avastin held 2/2 HTN and she is now on xeloda alone.\par \par Patient underwent resection for the 2 primaries as well as debulking in Oct 2018, with Dr. Parikh at [place].  Pathology revealed, complete response in rectum but residual moderately differentiated adenocarcinoma of sigmoid, omentum positive for adeno and right ovary positive for metastatic colon cancer . No perforation was noted but pos for perineural invasion. \par Rectum ypT1No\par Sigmoid ypT3No M1c \par \par Patient has been YOLIS , colonoscopy may 2019 one polyp. \par \par 2/2020 CT Imaging negative for mets, + fatty liver feb 2020. \par \par Cea is 3.8 which is up in may \par \par 6/2020 CT Imaging showed slight increase in adenopathy 8 mm to 1.1 cm \par \par 7/2020 PETCT - no uptake in nodes in retroperitoneum \par \par Foundation testing shows BRCA 2 mutation and also p53 mutation \par \par [facility] - RODOLFO mutation x2(2 diff heterogenous mutations uncertain sig ) , MSH6 heterogenous (undetermined sig) \par \par 10/2020 Mammogram done at [facility] in [place]  was negative\par \par 11/2020 CT Imaging - stable RP nodes , new multiple nodules up to 4 mm in lungs bilaterally , compared to feb 2020 \par \par 2/2021 CT CAP - revealing for progression of pulmonary metastases with slight increase in size and number of previously noted lung nodules, right lower lobe 6 mm up from 3 mm left upper lobe 5 mm up from 3 mm left lower lobe 6 mm up from 3 mm, the previously described retroperitoneal lymphadenopathy is stable at 1.1 cm there are no other areas of disease.\par \par -Patient with progression on Xeloda and Avastin, with worsening lung nodules on the scan in February 2021 switched to  oxaliplatin, Xeloda and Avastin\par -Cycle 2 of oxaliplatin was stopped after less than half of the infusion due to allergic reaction( 3/24/21) \par -then on irinotecan every 3 weeks ( xeloda dc due to toxicity ) \par -CT scan reviewed shows some slight progression in lung nodules but otherwise stable\par  was continued on the irinotecan and Avasti\par \par \par Gen path testing 10/19 : BOBBY. No mutations identified\par Foundation testing--7/1/2020----liquid bx -- somatic  BRCA2 splice site mutation and TP53 mutation. Her2 not amplified\par  [facility] testing does not indicate a BRCA germline, but did find 2 RODOLFO and one MSH6. (all are VUS) \par 6/17/22: Her2 FISH negative\par \par CT scan of the chest abdomen and pelvis done March 14, 2022 at [facility] shows some slight progression in lung nodules including the left upper lobe measuring 1 x 0.7 cm previously 0.8 x 0.6 left lower lobe measuring 1.1 x 0.8 previously 0.8 x 0.6 in the right lower lobe lung nodule 1.2 x 1 cm previously 1 x 0.9.Retroperitoneal adenopathy is stable there are no new areas of disease.\par Imaging consistent with slowly progressive disease, she was changed to [facility] in March 2022.  [de-identified] : Patient presents for routine follow up today for the management of metastatic CRC. \par She remains on Panitumumab due today. Irenotecan held. \par CT c/a/p : 1/16/23: No change in metastatic diseae --multiple bilateral spiculated pulmonary nodules compatible with diffuse pulmonary metastasis. These are stable. Left psoas and retroperitoneal massis stable. Adjacent destruction of L3 vertebral body\par She will also be scheduled for a colonoscopy with . \par \par \par \par \par  \par

## 2023-02-13 NOTE — ASSESSMENT
[FreeTextEntry1] : 62 year old woman with metastatic colorectal cancer, s/p hemicolectomy and debulking procedure. KRAS, NRAS, BRAF wildtype, MSI stable s/p FOLFOX (neoadjuvant)  \par \par She was switched to Xeloda/Avastin with noted POD and transitioned to oxaliplatin, Xeloda and Avastin (2/2021). Oxaliplatin was discontinued at C2 2/2 to allergic reaction (3/24/21) and switched to irinotecan. Xeloda was held due to toxicity\par \par Repeat CT scan of the chest abdomen pelvis in November 2021 shows some slight increase in size of the lung nodules but overall stability.  No evidence of any other metastatic disease.  Avastin has been on hold due to\par Dental procedures and colonoscopy.   CEA began to rise so Xeloda/Avastin was restarted in 12/2021. \par \par Gen path testing 10/19 : BOBBY. No mutations identified\par Foundation testing--7/1/2020----liquid bx -- somatic  BRCA2 splice site mutation and TP53 mutation. Her2 not amplified\par  [facility] testing does not indicate a BRCA germline, but did find 2 RODOLFO and one MSH6. (all are VUS) \par 6/17/22: Her2 FISH negative\par \par CT scan of the chest abdomen and pelvis done March 14, 2022 at [facility] shows some slight progression in lung nodules including the left upper lobe measuring 1 x 0.7 cm previously 0.8 x 0.6 left lower lobe measuring 1.1 x 0.8 previously 0.8 x 0.6 in the right lower lobe lung nodule 1.2 x 1 cm previously 1 x 0.9.Retroperitoneal adenopathy is stable there are no new areas of disease.Imaging consistent with slowly progressive disease, she was changed to [facility] in March 2022. \par \par 9/26/22 CT CAP imaging which revealed L para-aortic/L psoas mass is slightly increased in size measuring 4 x 2.5 cm. Additionally there is new bony destruction of the adjacent L3 vertebral body measuring 1.5 x 1.8 cm. There is a 1.3 x 1.5 cm subtle are of liver enhancement adjacent to the gallbladder. This is somewhat more pronounced when compared to prior study it is unclear if this represents a true underlying lesion versus perfusion anomaly. Pulmonary nodules are stable to slightly smaller compared to prior study. \par \par Panitumumab started 10/20/22\par \par 1/16/23 CT CAP revealing no change in metastatic disease within the CAP. \par \par Plan:\par - Recent CT imaging reviewed with patient \par - Labs reviewed. Okay to proceed with treatment today\par - Continue Panitumumab\par - Continue to hold  irinotecan 2/2 side effects of diarrhea. Recommend Immodium\par -  Acneiform rash  G1, improved. Hydrocortisone 2/5% + clinda gel\par - Dental clearance, needs extractions and denosumab\par - To consider olaparib in the future ( BRCA2 splice site mutationon liquid bx)\par Reviewed  abs- delay in chem secondary to fibrin in specimen. Peripheral draw next time. \par \par \par ulcerated border right tongue -- mucositis cocktail\par

## 2023-02-13 NOTE — PHYSICAL EXAM
[Restricted in physically strenuous activity but ambulatory and able to carry out work of a light or sedentary nature] : Status 1- Restricted in physically strenuous activity but ambulatory and able to carry out work of a light or sedentary nature, e.g., light house work, office work [Obese] : obese [Normal] : grossly intact [de-identified] : ulcer over border of right tongue [de-identified] : acneiform rash over the cheeks [de-identified] : colostomy with  hernia , non tender no masses  [de-identified] : very anxious

## 2023-02-23 ENCOUNTER — APPOINTMENT (OUTPATIENT)
Dept: HEMATOLOGY ONCOLOGY | Facility: CLINIC | Age: 63
End: 2023-02-23
Payer: COMMERCIAL

## 2023-02-23 ENCOUNTER — RESULT REVIEW (OUTPATIENT)
Age: 63
End: 2023-02-23

## 2023-02-23 VITALS
OXYGEN SATURATION: 98 % | DIASTOLIC BLOOD PRESSURE: 75 MMHG | WEIGHT: 144 LBS | RESPIRATION RATE: 18 BRPM | HEART RATE: 94 BPM | SYSTOLIC BLOOD PRESSURE: 123 MMHG | HEIGHT: 61 IN | BODY MASS INDEX: 27.19 KG/M2 | TEMPERATURE: 98.7 F

## 2023-02-23 DIAGNOSIS — L70.8 OTHER ACNE: ICD-10-CM

## 2023-02-23 PROCEDURE — 99213 OFFICE O/P EST LOW 20 MIN: CPT | Mod: 25

## 2023-02-23 PROCEDURE — 36415 COLL VENOUS BLD VENIPUNCTURE: CPT

## 2023-02-23 RX ORDER — MINOCYCLINE HYDROCHLORIDE 100 MG/1
100 TABLET ORAL
Qty: 60 | Refills: 1 | Status: DISCONTINUED | COMMUNITY
Start: 2022-12-01 | End: 2023-02-23

## 2023-02-26 PROBLEM — L70.8 ACNEIFORM ERUPTION: Status: ACTIVE | Noted: 2022-11-04

## 2023-02-26 LAB — CEA SERPL-MCNC: 4.2 NG/ML

## 2023-02-26 NOTE — ASSESSMENT
[FreeTextEntry1] : 62 year old woman with metastatic colorectal cancer, s/p hemicolectomy and debulking procedure. KRAS, NRAS, BRAF wildtype, MSI stable s/p FOLFOX (neoadjuvant)  \par \par She was switched to Xeloda/Avastin with noted POD and transitioned to oxaliplatin, Xeloda and Avastin (2/2021). Oxaliplatin was discontinued at C2 2/2 to allergic reaction (3/24/21) and switched to irinotecan. Xeloda was held due to toxicity\par \par Repeat CT scan of the chest abdomen pelvis in November 2021 shows some slight increase in size of the lung nodules but overall stability.  No evidence of any other metastatic disease.  Avastin has been on hold due to\par Dental procedures and colonoscopy.   CEA began to rise so Xeloda/Avastin was restarted in 12/2021. \par \par Gen path testing 10/19 : BOBBY. No mutations identified\par Foundation testing--7/1/2020----liquid bx -- somatic  BRCA2 splice site mutation and TP53 mutation. Her2 not amplified\par  [facility] testing does not indicate a BRCA germline, but did find 2 RODOLFO and one MSH6. (all are VUS) \par 6/17/22: Her2 FISH negative\par \par CT scan of the chest abdomen and pelvis done March 14, 2022 at [facility] shows some slight progression in lung nodules including the left upper lobe measuring 1 x 0.7 cm previously 0.8 x 0.6 left lower lobe measuring 1.1 x 0.8 previously 0.8 x 0.6 in the right lower lobe lung nodule 1.2 x 1 cm previously 1 x 0.9.Retroperitoneal adenopathy is stable there are no new areas of disease.Imaging consistent with slowly progressive disease, she was changed to [facility] in March 2022. \par \par 9/26/22 CT CAP imaging which revealed L para-aortic/L psoas mass is slightly increased in size measuring 4 x 2.5 cm. Additionally there is new bony destruction of the adjacent L3 vertebral body measuring 1.5 x 1.8 cm. There is a 1.3 x 1.5 cm subtle are of liver enhancement adjacent to the gallbladder. This is somewhat more pronounced when compared to prior study it is unclear if this represents a true underlying lesion versus perfusion anomaly. Pulmonary nodules are stable to slightly smaller compared to prior study. \par \par Panitumumab started 10/20/22\par \par 1/16/23 CT CAP revealing no change in metastatic disease within the CAP. \par \par Plan:\par - Continue Panitumumab\par - Continue to hold  irinotecan 2/2 side effects of diarrhea. \par -  Acneiform rash  G1, improved. Hydrocortisone 2/5% + clinda gel\par - Dental clearance, needs extractions and denosumab. rEdiscussed\par --  wt. loss --f/u CEA/imaging ?progression. Discussed options with patient.  Refer to allergy for oxaliplatin desensitization\par \par

## 2023-02-26 NOTE — HISTORY OF PRESENT ILLNESS
[0 - No Distress] : Distress Level: 0 [de-identified] : This is a 62 year old woman with a history of stage 4 colon cancer, KRAS wild type, MSI stable, rectal and sigmoid primary, found to have peritoneal disease at time of initial staging.  She is s/p FOLFIRINOX and Avastin originally, irinotecan dropped for toxicity. She is s/p FOLFOX avastin x6 months. Avastin held 2/2 HTN and she is now on xeloda alone.\par \par Patient underwent resection for the 2 primaries as well as debulking in Oct 2018, with Dr. Parikh at [place].  Pathology revealed, complete response in rectum but residual moderately differentiated adenocarcinoma of sigmoid, omentum positive for adeno and right ovary positive for metastatic colon cancer . No perforation was noted but pos for perineural invasion. \par Rectum ypT1No\par Sigmoid ypT3No M1c \par \par Patient has been YOLIS , colonoscopy may 2019 one polyp. \par \par 2/2020 CT Imaging negative for mets, + fatty liver feb 2020. \par \par Cea is 3.8 which is up in may \par \par 6/2020 CT Imaging showed slight increase in adenopathy 8 mm to 1.1 cm \par \par 7/2020 PETCT - no uptake in nodes in retroperitoneum \par \par Foundation testing shows BRCA 2 mutation and also p53 mutation \par \par [facility] - RODOLFO mutation x2(2 diff heterogenous mutations uncertain sig ) , MSH6 heterogenous (undetermined sig) \par \par 10/2020 Mammogram done at [facility] ([place], [place]) in [place]  was negative\par \par 11/2020 CT Imaging - stable RP nodes , new multiple nodules up to 4 mm in lungs bilaterally , compared to feb 2020 \par \par 2/2021 CT CAP - revealing for progression of pulmonary metastases with slight increase in size and number of previously noted lung nodules, right lower lobe 6 mm up from 3 mm left upper lobe 5 mm up from 3 mm left lower lobe 6 mm up from 3 mm, the previously described retroperitoneal lymphadenopathy is stable at 1.1 cm there are no other areas of disease.\par \par -Patient with progression on Xeloda and Avastin, with worsening lung nodules on the scan in February 2021 switched to  oxaliplatin, Xeloda and Avastin\par -Cycle 2 of oxaliplatin was stopped after less than half of the infusion due to allergic reaction( 3/24/21) \par -then on irinotecan every 3 weeks ( xeloda dc due to toxicity ) \par -CT scan reviewed shows some slight progression in lung nodules but otherwise stable\par  was continued on the irinotecan and Avasti\par \par \par Gen path testing 10/19 : BOBBY. No mutations identified\par Foundation testing--7/1/2020----liquid bx -- somatic  BRCA2 splice site mutation and TP53 mutation. Her2 not amplified\par  [facility] testing does not indicate a BRCA germline, but did find 2 RODOLFO and one MSH6. (all are VUS) \par 6/17/22: Her2 FISH negative\par \par CT scan of the chest abdomen and pelvis done March 14, 2022 at [facility] shows some slight progression in lung nodules including the left upper lobe measuring 1 x 0.7 cm previously 0.8 x 0.6 left lower lobe measuring 1.1 x 0.8 previously 0.8 x 0.6 in the right lower lobe lung nodule 1.2 x 1 cm previously 1 x 0.9.Retroperitoneal adenopathy is stable there are no new areas of disease.\par Imaging consistent with slowly progressive disease, she was changed to [facility] in March 2022.  [de-identified] : Patient presents for routine follow up today for the management of metastatic CRC. \par She remains on Panitumumab due today. Irenotecan held. \par CT c/a/p : 1/16/23: No change in metastatic diseae --multiple bilateral spiculated pulmonary nodules compatible with diffuse pulmonary metastasis. These are stable. Left psoas and retroperitoneal massis stable. Adjacent destruction of L3 vertebral body\par She will also be scheduled for a colonoscopy with . \par \par \par \par \par  \par

## 2023-02-26 NOTE — PHYSICAL EXAM
[Restricted in physically strenuous activity but ambulatory and able to carry out work of a light or sedentary nature] : Status 1- Restricted in physically strenuous activity but ambulatory and able to carry out work of a light or sedentary nature, e.g., light house work, office work [Obese] : obese [Normal] : grossly intact [de-identified] : colostomy with  hernia , non tender no masses  [de-identified] : ulcer over border of right tongue [de-identified] : acneiform rash over the cheeks [de-identified] : very anxious

## 2023-03-05 LAB
CEA SERPL-MCNC: 5.3 NG/ML
MAGNESIUM SERPL-MCNC: 1.8 MG/DL
MAGNESIUM SERPL-MCNC: 1.8 MG/DL

## 2023-03-09 ENCOUNTER — RESULT REVIEW (OUTPATIENT)
Age: 63
End: 2023-03-09

## 2023-03-09 ENCOUNTER — APPOINTMENT (OUTPATIENT)
Dept: HEMATOLOGY ONCOLOGY | Facility: CLINIC | Age: 63
End: 2023-03-09
Payer: COMMERCIAL

## 2023-03-09 VITALS
RESPIRATION RATE: 18 BRPM | HEIGHT: 61 IN | DIASTOLIC BLOOD PRESSURE: 69 MMHG | WEIGHT: 143 LBS | HEART RATE: 97 BPM | OXYGEN SATURATION: 98 % | SYSTOLIC BLOOD PRESSURE: 103 MMHG | TEMPERATURE: 99.1 F | BODY MASS INDEX: 27 KG/M2

## 2023-03-09 PROCEDURE — 99213 OFFICE O/P EST LOW 20 MIN: CPT | Mod: 25

## 2023-03-09 NOTE — PHYSICAL EXAM
[Restricted in physically strenuous activity but ambulatory and able to carry out work of a light or sedentary nature] : Status 1- Restricted in physically strenuous activity but ambulatory and able to carry out work of a light or sedentary nature, e.g., light house work, office work [Obese] : obese [Normal] : grossly intact [de-identified] : ulcer over border of right tongue [de-identified] : colostomy with  hernia , non tender no masses  [de-identified] : acneiform rash over the cheeks [de-identified] : very anxious

## 2023-03-09 NOTE — HISTORY OF PRESENT ILLNESS
[0 - No Distress] : Distress Level: 0 [de-identified] : This is a 62 year old woman with a history of stage 4 colon cancer, KRAS wild type, MSI stable, rectal and sigmoid primary, found to have peritoneal disease at time of initial staging.  She is s/p FOLFIRINOX and Avastin originally, irinotecan dropped for toxicity. She is s/p FOLFOX avastin x6 months. Avastin held 2/2 HTN and she is now on xeloda alone.\par \par Patient underwent resection for the 2 primaries as well as debulking in Oct 2018, with Dr. Parikh at [place].  Pathology revealed, complete response in rectum but residual moderately differentiated adenocarcinoma of sigmoid, omentum positive for adeno and right ovary positive for metastatic colon cancer . No perforation was noted but pos for perineural invasion. \par Rectum ypT1No\par Sigmoid ypT3No M1c \par \par Patient has been YOLIS , colonoscopy may 2019 one polyp. \par \par 2/2020 CT Imaging negative for mets, + fatty liver feb 2020. \par \par Cea is 3.8 which is up in may \par \par 6/2020 CT Imaging showed slight increase in adenopathy 8 mm to 1.1 cm \par \par 7/2020 PETCT - no uptake in nodes in retroperitoneum \par \par Foundation testing shows BRCA 2 mutation and also p53 mutation \par \par [facility] - RODOLFO mutation x2(2 diff heterogenous mutations uncertain sig ) , MSH6 heterogenous (undetermined sig) \par \par 10/2020 Mammogram done at [facility] in [place]  was negative\par \par 11/2020 CT Imaging - stable RP nodes , new multiple nodules up to 4 mm in lungs bilaterally , compared to feb 2020 \par \par 2/2021 CT CAP - revealing for progression of pulmonary metastases with slight increase in size and number of previously noted lung nodules, right lower lobe 6 mm up from 3 mm left upper lobe 5 mm up from 3 mm left lower lobe 6 mm up from 3 mm, the previously described retroperitoneal lymphadenopathy is stable at 1.1 cm there are no other areas of disease.\par \par -Patient with progression on Xeloda and Avastin, with worsening lung nodules on the scan in February 2021 switched to  oxaliplatin, Xeloda and Avastin\par -Cycle 2 of oxaliplatin was stopped after less than half of the infusion due to allergic reaction( 3/24/21) \par -then on irinotecan every 3 weeks ( xeloda dc due to toxicity ) \par -CT scan reviewed shows some slight progression in lung nodules but otherwise stable\par  was continued on the irinotecan and Avasti\par \par \par Gen path testing 10/19 : BOBBY. No mutations identified\par Foundation testing--7/1/2020----liquid bx -- somatic  BRCA2 splice site mutation and TP53 mutation. Her2 not amplified\par  [facility] testing does not indicate a BRCA germline, but did find 2 RODOLFO and one MSH6. (all are VUS) \par 6/17/22: Her2 FISH negative\par \par CT scan of the chest abdomen and pelvis done March 14, 2022 at [facility] shows some slight progression in lung nodules including the left upper lobe measuring 1 x 0.7 cm previously 0.8 x 0.6 left lower lobe measuring 1.1 x 0.8 previously 0.8 x 0.6 in the right lower lobe lung nodule 1.2 x 1 cm previously 1 x 0.9.Retroperitoneal adenopathy is stable there are no new areas of disease.\par Imaging consistent with slowly progressive disease, she was changed to [facility] in March 2022.  [de-identified] : Patient presents for routine follow up today for the management of metastatic CRC. \par She remains on Panitumumab due today. Irenotecan held. \par CT c/a/p : 1/16/23: No change in metastatic diseae --multiple bilateral spiculated pulmonary nodules compatible with diffuse pulmonary metastasis. These are stable. Left psoas and retroperitoneal massis stable. Adjacent destruction of L3 vertebral body\par She will also be scheduled for a colonoscopy with . \par \par \par \par \par  \par

## 2023-03-09 NOTE — ASSESSMENT
[FreeTextEntry1] : 62 year old woman with metastatic colorectal cancer, s/p hemicolectomy and debulking procedure. KRAS, NRAS, BRAF wildtype, MSI stable s/p FOLFOX (neoadjuvant)  \par \par She was switched to Xeloda/Avastin with noted POD and transitioned to oxaliplatin, Xeloda and Avastin (2/2021). Oxaliplatin was discontinued at C2 2/2 to allergic reaction (3/24/21) and switched to irinotecan. Xeloda was held due to toxicity\par \par Repeat CT scan of the chest abdomen pelvis in November 2021 shows some slight increase in size of the lung nodules but overall stability.  No evidence of any other metastatic disease.  Avastin has been on hold due to\par Dental procedures and colonoscopy.   CEA began to rise so Xeloda/Avastin was restarted in 12/2021. \par \par Gen path testing 10/19 : BOBBY. No mutations identified\par Foundation testing--7/1/2020----liquid bx -- somatic  BRCA2 splice site mutation and TP53 mutation. Her2 not amplified\par  [facility] testing does not indicate a BRCA germline, but did find 2 RODOLFO and one MSH6. (all are VUS) \par 6/17/22: Her2 FISH negative\par \par CT scan of the chest abdomen and pelvis done March 14, 2022 at [facility] shows some slight progression in lung nodules including the left upper lobe measuring 1 x 0.7 cm previously 0.8 x 0.6 left lower lobe measuring 1.1 x 0.8 previously 0.8 x 0.6 in the right lower lobe lung nodule 1.2 x 1 cm previously 1 x 0.9.Retroperitoneal adenopathy is stable there are no new areas of disease.Imaging consistent with slowly progressive disease, she was changed to [facility] in March 2022. \par \par 9/26/22 CT CAP imaging which revealed L para-aortic/L psoas mass is slightly increased in size measuring 4 x 2.5 cm. Additionally there is new bony destruction of the adjacent L3 vertebral body measuring 1.5 x 1.8 cm. There is a 1.3 x 1.5 cm subtle are of liver enhancement adjacent to the gallbladder. This is somewhat more pronounced when compared to prior study it is unclear if this represents a true underlying lesion versus perfusion anomaly. Pulmonary nodules are stable to slightly smaller compared to prior study. \par \par Panitumumab started 10/20/22\par \par 1/16/23 CT CAP revealing no change in metastatic disease within the CAP. \par \par Plan:\par - Continue Panitumumab\par - Continue to hold  irinotecan 2/2 side effects of diarrhea. \par -  Acneiform rash  G1, improved. Hydrocortisone 2/5% + clinda gel\par - Dental clearance, needs extractions and denosumab. rEdiscussed\par --  wt. loss --f/u CEA/imaging ?progression. Discussed options with patient.  Refer to allergy for oxaliplatin desensitization\par -Replete Magnesium\par \par Labs ordered, drawn in the office and reviewed.\par Next visit will order CBC with diff, CMP, CEA, LDH\par \par \par \par

## 2023-03-23 ENCOUNTER — RESULT REVIEW (OUTPATIENT)
Age: 63
End: 2023-03-23

## 2023-03-23 ENCOUNTER — NON-APPOINTMENT (OUTPATIENT)
Age: 63
End: 2023-03-23

## 2023-03-23 ENCOUNTER — APPOINTMENT (OUTPATIENT)
Dept: HEMATOLOGY ONCOLOGY | Facility: CLINIC | Age: 63
End: 2023-03-23
Payer: COMMERCIAL

## 2023-03-23 VITALS
HEART RATE: 100 BPM | SYSTOLIC BLOOD PRESSURE: 124 MMHG | RESPIRATION RATE: 18 BRPM | OXYGEN SATURATION: 98 % | WEIGHT: 138 LBS | HEIGHT: 61 IN | TEMPERATURE: 98.7 F | BODY MASS INDEX: 26.06 KG/M2 | DIASTOLIC BLOOD PRESSURE: 75 MMHG

## 2023-03-23 PROCEDURE — 99213 OFFICE O/P EST LOW 20 MIN: CPT | Mod: 25

## 2023-03-23 PROCEDURE — 36415 COLL VENOUS BLD VENIPUNCTURE: CPT

## 2023-03-23 NOTE — PHYSICAL EXAM
[Restricted in physically strenuous activity but ambulatory and able to carry out work of a light or sedentary nature] : Status 1- Restricted in physically strenuous activity but ambulatory and able to carry out work of a light or sedentary nature, e.g., light house work, office work [Obese] : obese [Normal] : grossly intact [de-identified] : ulcer over border of right tongue [de-identified] : colostomy with  hernia , non tender no masses  [de-identified] : acneiform rash over the cheeks [de-identified] : very anxious

## 2023-03-23 NOTE — ASSESSMENT
[FreeTextEntry1] : 62 year old woman with metastatic colorectal cancer, s/p hemicolectomy and debulking procedure. KRAS, NRAS, BRAF wildtype, MSI stable s/p FOLFOX (neoadjuvant)  \par \par She was switched to Xeloda/Avastin with noted POD and transitioned to oxaliplatin, Xeloda and Avastin (2/2021). Oxaliplatin was discontinued at C2 2/2 to allergic reaction (3/24/21) and switched to irinotecan. Xeloda was held due to toxicity\par \par Repeat CT scan of the chest abdomen pelvis in November 2021 shows some slight increase in size of the lung nodules but overall stability.  No evidence of any other metastatic disease.  Avastin has been on hold due to\par Dental procedures and colonoscopy.   CEA began to rise so Xeloda/Avastin was restarted in 12/2021. \par \par Gen path testing 10/19 : BOBBY. No mutations identified\par Foundation testing--7/1/2020----liquid bx -- somatic  BRCA2 splice site mutation and TP53 mutation. Her2 not amplified\par  [facility] testing does not indicate a BRCA germline, but did find 2 RODOLFO and one MSH6. (all are VUS) \par 6/17/22: Her2 FISH negative\par \par CT scan of the chest abdomen and pelvis done March 14, 2022 at [facility] shows some slight progression in lung nodules including the left upper lobe measuring 1 x 0.7 cm previously 0.8 x 0.6 left lower lobe measuring 1.1 x 0.8 previously 0.8 x 0.6 in the right lower lobe lung nodule 1.2 x 1 cm previously 1 x 0.9.Retroperitoneal adenopathy is stable there are no new areas of disease.Imaging consistent with slowly progressive disease, she was changed to [facility] in March 2022. \par \par 9/26/22 CT CAP imaging which revealed L para-aortic/L psoas mass is slightly increased in size measuring 4 x 2.5 cm. Additionally there is new bony destruction of the adjacent L3 vertebral body measuring 1.5 x 1.8 cm. There is a 1.3 x 1.5 cm subtle are of liver enhancement adjacent to the gallbladder. This is somewhat more pronounced when compared to prior study it is unclear if this represents a true underlying lesion versus perfusion anomaly. Pulmonary nodules are stable to slightly smaller compared to prior study. \par \par Panitumumab started 10/20/22\par \par 1/16/23 CT CAP revealing no change in metastatic disease within the CAP. \par \par Plan:-\par check CT scans. Hold panitumumab this week due to duarrhea. IV hydration\par - Continue to hold  irinotecan 2/2 side effects of diarrhea. \par -  Acneiform rash  G1, improved. Hydrocortisone 2/5% + clinda gel\par - Dental clearance, needs extractions and denosumab. rEdiscussed\par --  wt. loss --f/u CEA/imaging ?progression. Discussed options with patient.  Refer to allergy for oxaliplatin desensitization\par -Replete Magnesium\par \par Labs ordered, drawn in the office and reviewed.\par Next visit will order CBC with diff, CMP,Magnesium\par \par RTC 1 week\par

## 2023-03-23 NOTE — HISTORY OF PRESENT ILLNESS
[0 - No Distress] : Distress Level: 0 [de-identified] : This is a 62 year old woman with a history of stage 4 colon cancer, KRAS wild type, MSI stable, rectal and sigmoid primary, found to have peritoneal disease at time of initial staging.  She is s/p FOLFIRINOX and Avastin originally, irinotecan dropped for toxicity. She is s/p FOLFOX avastin x6 months. Avastin held 2/2 HTN and she is now on xeloda alone.\par \par Patient underwent resection for the 2 primaries as well as debulking in Oct 2018, with Dr. Parikh at [place].  Pathology revealed, complete response in rectum but residual moderately differentiated adenocarcinoma of sigmoid, omentum positive for adeno and right ovary positive for metastatic colon cancer . No perforation was noted but pos for perineural invasion. \par Rectum ypT1No\par Sigmoid ypT3No M1c \par \par Patient has been YOLIS , colonoscopy may 2019 one polyp. \par \par 2/2020 CT Imaging negative for mets, + fatty liver feb 2020. \par \par Cea is 3.8 which is up in may \par \par 6/2020 CT Imaging showed slight increase in adenopathy 8 mm to 1.1 cm \par \par 7/2020 PETCT - no uptake in nodes in retroperitoneum \par \par Foundation testing shows BRCA 2 mutation and also p53 mutation \par \par [facility] - RODOLFO mutation x2(2 diff heterogenous mutations uncertain sig ) , MSH6 heterogenous (undetermined sig) \par \par 10/2020 Mammogram done at [facility] in [place]  was negative\par \par 11/2020 CT Imaging - stable RP nodes , new multiple nodules up to 4 mm in lungs bilaterally , compared to feb 2020 \par \par 2/2021 CT CAP - revealing for progression of pulmonary metastases with slight increase in size and number of previously noted lung nodules, right lower lobe 6 mm up from 3 mm left upper lobe 5 mm up from 3 mm left lower lobe 6 mm up from 3 mm, the previously described retroperitoneal lymphadenopathy is stable at 1.1 cm there are no other areas of disease.\par \par -Patient with progression on Xeloda and Avastin, with worsening lung nodules on the scan in February 2021 switched to  oxaliplatin, Xeloda and Avastin\par -Cycle 2 of oxaliplatin was stopped after less than half of the infusion due to allergic reaction( 3/24/21) \par -then on irinotecan every 3 weeks ( xeloda dc due to toxicity ) \par -CT scan reviewed shows some slight progression in lung nodules but otherwise stable\par  was continued on the irinotecan and Avasti\par \par \par Gen path testing 10/19 : BOBBY. No mutations identified\par Foundation testing--7/1/2020----liquid bx -- somatic  BRCA2 splice site mutation and TP53 mutation. Her2 not amplified\par  [facility] testing does not indicate a BRCA germline, but did find 2 RODOLFO and one MSH6. (all are VUS) \par 6/17/22: Her2 FISH negative\par \par CT scan of the chest abdomen and pelvis done March 14, 2022 at [facility] shows some slight progression in lung nodules including the left upper lobe measuring 1 x 0.7 cm previously 0.8 x 0.6 left lower lobe measuring 1.1 x 0.8 previously 0.8 x 0.6 in the right lower lobe lung nodule 1.2 x 1 cm previously 1 x 0.9.Retroperitoneal adenopathy is stable there are no new areas of disease.\par Imaging consistent with slowly progressive disease, she was changed to [facility] in March 2022.  [de-identified] : Patient presents for routine follow up today for the management of metastatic CRC. \par She remains on Panitumumab due today. Irenotecan held. \par CT c/a/p : 1/16/23: No change in metastatic diseae --multiple bilateral spiculated pulmonary nodules compatible with diffuse pulmonary metastasis. These are stable. Left psoas and retroperitoneal massis stable. Adjacent destruction of L3 vertebral body\par She will also be scheduled for a colonoscopy with . \par \par \par PAtient with G2-3 diarrhea\par Attributing wt. loss to diarrhea due to panitumumab\par \par \par \par \par  \par

## 2023-04-06 ENCOUNTER — APPOINTMENT (OUTPATIENT)
Dept: HEMATOLOGY ONCOLOGY | Facility: CLINIC | Age: 63
End: 2023-04-06
Payer: COMMERCIAL

## 2023-04-06 ENCOUNTER — RESULT REVIEW (OUTPATIENT)
Age: 63
End: 2023-04-06

## 2023-04-06 VITALS
SYSTOLIC BLOOD PRESSURE: 127 MMHG | HEART RATE: 111 BPM | HEIGHT: 61 IN | BODY MASS INDEX: 25.3 KG/M2 | RESPIRATION RATE: 18 BRPM | OXYGEN SATURATION: 98 % | WEIGHT: 134 LBS | TEMPERATURE: 98.8 F | DIASTOLIC BLOOD PRESSURE: 92 MMHG

## 2023-04-06 PROCEDURE — 99213 OFFICE O/P EST LOW 20 MIN: CPT

## 2023-04-09 NOTE — PHYSICAL EXAM
[Restricted in physically strenuous activity but ambulatory and able to carry out work of a light or sedentary nature] : Status 1- Restricted in physically strenuous activity but ambulatory and able to carry out work of a light or sedentary nature, e.g., light house work, office work [Obese] : obese [Normal] : full range of motion and no deformities appreciated [de-identified] : ulcer over border of right tongue [de-identified] : colostomy with  hernia , non tender no masses  [de-identified] : acneiform rash over the cheeks [de-identified] : Tardive dyskinesia [de-identified] : very anxious

## 2023-04-09 NOTE — HISTORY OF PRESENT ILLNESS
[0 - No Distress] : Distress Level: 0 [de-identified] : This is a 62 year old woman with a history of stage 4 colon cancer, KRAS wild type, MSI stable, rectal and sigmoid primary, found to have peritoneal disease at time of initial staging.  She is s/p FOLFIRINOX and Avastin originally, irinotecan dropped for toxicity. She is s/p FOLFOX avastin x6 months. Avastin held 2/2 HTN and she is now on xeloda alone.\par \par Patient underwent resection for the 2 primaries as well as debulking in Oct 2018, with Dr. Parikh at [place].  Pathology revealed, complete response in rectum but residual moderately differentiated adenocarcinoma of sigmoid, omentum positive for adeno and right ovary positive for metastatic colon cancer . No perforation was noted but pos for perineural invasion. \par Rectum ypT1No\par Sigmoid ypT3No M1c \par \par Patient has been YOLIS , colonoscopy may 2019 one polyp. \par \par 2/2020 CT Imaging negative for mets, + fatty liver feb 2020. \par \par Cea is 3.8 which is up in may \par \par 6/2020 CT Imaging showed slight increase in adenopathy 8 mm to 1.1 cm \par \par 7/2020 PETCT - no uptake in nodes in retroperitoneum \par \par Foundation testing shows BRCA 2 mutation and also p53 mutation \par \par [facility] - RODOLFO mutation x2(2 diff heterogenous mutations uncertain sig ) , MSH6 heterogenous (undetermined sig) \par \par 10/2020 Mammogram done at [facility] in [place]  was negative\par \par 11/2020 CT Imaging - stable RP nodes , new multiple nodules up to 4 mm in lungs bilaterally , compared to feb 2020 \par \par 2/2021 CT CAP - revealing for progression of pulmonary metastases with slight increase in size and number of previously noted lung nodules, right lower lobe 6 mm up from 3 mm left upper lobe 5 mm up from 3 mm left lower lobe 6 mm up from 3 mm, the previously described retroperitoneal lymphadenopathy is stable at 1.1 cm there are no other areas of disease.\par \par -Patient with progression on Xeloda and Avastin, with worsening lung nodules on the scan in February 2021 switched to  oxaliplatin, Xeloda and Avastin\par -Cycle 2 of oxaliplatin was stopped after less than half of the infusion due to allergic reaction( 3/24/21) \par -then on irinotecan every 3 weeks ( xeloda dc due to toxicity ) \par -CT scan reviewed shows some slight progression in lung nodules but otherwise stable\par  was continued on the irinotecan and Avasti\par \par \par Gen path testing 10/19 : BOBBY. No mutations identified\par Foundation testing--7/1/2020----liquid bx -- somatic  BRCA2 splice site mutation and TP53 mutation. Her2 not amplified\par  [facility] testing does not indicate a BRCA germline, but did find 2 RODOLFO and one MSH6. (all are VUS) \par 6/17/22: Her2 FISH negative\par \par CT scan of the chest abdomen and pelvis done March 14, 2022 at [facility] shows some slight progression in lung nodules including the left upper lobe measuring 1 x 0.7 cm previously 0.8 x 0.6 left lower lobe measuring 1.1 x 0.8 previously 0.8 x 0.6 in the right lower lobe lung nodule 1.2 x 1 cm previously 1 x 0.9.Retroperitoneal adenopathy is stable there are no new areas of disease.\par Imaging consistent with slowly progressive disease, she was changed to [facility] in March 2022.  [de-identified] : Patient presents for routine follow up today for the management of metastatic CRC. \par CT c/a/p done on 4/4/23 showed re demonstration of innumerable pulmonary nodules with slight increase in size. Retroperitoneal mass stable. Increased lytic component in L1 and L3  vertebral bodies. Panitumumab and Irinotecan held at her last visit on 3/23/23.  She continues to have an acneform rash and occasional diarrhea. 18 lb weight loss since Jan which she is attributing to diarrhea due to panitumumab. She takes Loperamide sporadically. \par \par \par \par \par \par \par \par  \par

## 2023-04-09 NOTE — ASSESSMENT
[FreeTextEntry1] : 62 year old woman with metastatic colorectal cancer, s/p hemicolectomy and debulking procedure. KRAS, NRAS, BRAF wildtype, MSI stable s/p FOLFOX (neoadjuvant)  \par \par She was switched to Xeloda/Avastin with noted POD and transitioned to oxaliplatin, Xeloda and Avastin (2/2021). Oxaliplatin was discontinued at C2 2/2 to allergic reaction (3/24/21) and switched to irinotecan. Xeloda was held due to toxicity\par \par Repeat CT scan of the chest abdomen pelvis in November 2021 shows some slight increase in size of the lung nodules but overall stability.  No evidence of any other metastatic disease.  Avastin has been on hold due to\par Dental procedures and colonoscopy.   CEA began to rise so Xeloda/Avastin was restarted in 12/2021. \par \par Gen path testing 10/19 : BOBBY. No mutations identified\par Foundation testing--7/1/2020----liquid bx -- somatic  BRCA2 splice site mutation and TP53 mutation. Her2 not amplified\par  [facility] testing does not indicate a BRCA germline, but did find 2 RODOLFO and one MSH6. (all are VUS) \par 6/17/22: Her2 FISH negative\par \par CT scan of the chest abdomen and pelvis done March 14, 2022 at [facility] shows some slight progression in lung nodules including the left upper lobe measuring 1 x 0.7 cm previously 0.8 x 0.6 left lower lobe measuring 1.1 x 0.8 previously 0.8 x 0.6 in the right lower lobe lung nodule 1.2 x 1 cm previously 1 x 0.9.Retroperitoneal adenopathy is stable there are no new areas of disease.Imaging consistent with slowly progressive disease, she was changed to [facility] in March 2022. \par \par 9/26/22 CT CAP imaging which revealed L para-aortic/L psoas mass is slightly increased in size measuring 4 x 2.5 cm. Additionally there is new bony destruction of the adjacent L3 vertebral body measuring 1.5 x 1.8 cm. There is a 1.3 x 1.5 cm subtle are of liver enhancement adjacent to the gallbladder. This is somewhat more pronounced when compared to prior study it is unclear if this represents a true underlying lesion versus perfusion anomaly. Pulmonary nodules are stable to slightly smaller compared to prior study. \par \par Panitumumab started 10/20/22\par \par 1/16/23 CT CAP revealing no change in metastatic disease within the CAP. \par \par Plan:-\par - Reviewed results of CT C/A/P done on 4/4/23 which showed re demonstration of innumerable pulmonary nodules with slight increase in size. Retroperitoneal mass stable. Increased lytic component in L1 and L3  vertebral bodies  scans. Discussed adding Irinotecan back to Panitumumab vs Regorafenib.  Pt would like to hold chemo this week and resume with Panitumumab and Irinotecan.  \par - Reviewed labs which showed hyponatremia and hypomagnesemia. Recommended IV hydration with Mg supplementation but pt preferred to to take oral Mg at home. \par - Continue to hold  irinotecan 2/2 side effects of diarrhea. \par -  Acneiform rash  G1, improved. Hydrocortisone 2/5% + clinda gel\par - Dental clearance, needs extractions and denosumab. rediscussed\par --  Refer to allergy for oxaliplatin desensitization\par - Continue routine, age-appropriate, healthcare maintenance \par - History of present illness, review of systems, physical exam and treatment plan reviewed with Dr. Verito Martin\par  - Office visit in 2 weeks or prn for new or worsening symptoms. \par

## 2023-04-09 NOTE — REVIEW OF SYSTEMS
weakness [Fatigue] : fatigue [Abdominal Pain] : abdominal pain [Constipation] : constipation [Diarrhea] : diarrhea [Muscle Pain] : muscle pain [Negative] : Allergic/Immunologic [Skin Rash] : skin rash [FreeTextEntry2] : Weight loss of 18 lbs since Jan, 4 lbs in two weeks [FreeTextEntry7] : abdominal cramps [FreeTextEntry9] : +lower back pain [de-identified] : acneform rash

## 2023-04-13 ENCOUNTER — RESULT REVIEW (OUTPATIENT)
Age: 63
End: 2023-04-13

## 2023-04-13 ENCOUNTER — APPOINTMENT (OUTPATIENT)
Dept: HEMATOLOGY ONCOLOGY | Facility: CLINIC | Age: 63
End: 2023-04-13
Payer: COMMERCIAL

## 2023-04-13 VITALS
HEIGHT: 61 IN | WEIGHT: 131 LBS | SYSTOLIC BLOOD PRESSURE: 120 MMHG | OXYGEN SATURATION: 98 % | TEMPERATURE: 98.7 F | DIASTOLIC BLOOD PRESSURE: 76 MMHG | HEART RATE: 81 BPM | RESPIRATION RATE: 18 BRPM | BODY MASS INDEX: 24.73 KG/M2

## 2023-04-13 PROCEDURE — 99213 OFFICE O/P EST LOW 20 MIN: CPT

## 2023-04-19 NOTE — PHYSICAL EXAM
[Restricted in physically strenuous activity but ambulatory and able to carry out work of a light or sedentary nature] : Status 1- Restricted in physically strenuous activity but ambulatory and able to carry out work of a light or sedentary nature, e.g., light house work, office work [Obese] : obese [Normal] : full range of motion and no deformities appreciated [de-identified] : ulcer over border of right tongue [de-identified] : colostomy with  hernia , non tender no masses  [de-identified] : acneiform rash over the cheeks [de-identified] : Tardive dyskinesia [de-identified] : very anxious

## 2023-04-19 NOTE — HISTORY OF PRESENT ILLNESS
[0 - No Distress] : Distress Level: 0 [de-identified] : This is a 62 year old woman with a history of stage 4 colon cancer, KRAS wild type, MSI stable, rectal and sigmoid primary, found to have peritoneal disease at time of initial staging.  She is s/p FOLFIRINOX and Avastin originally, irinotecan dropped for toxicity. She is s/p FOLFOX avastin x6 months. Avastin held 2/2 HTN and she is now on xeloda alone.\par \par Patient underwent resection for the 2 primaries as well as debulking in Oct 2018, with Dr. Parikh at [place].  Pathology revealed, complete response in rectum but residual moderately differentiated adenocarcinoma of sigmoid, omentum positive for adeno and right ovary positive for metastatic colon cancer . No perforation was noted but pos for perineural invasion. \par Rectum ypT1No\par Sigmoid ypT3No M1c \par \par Patient has been YOLIS , colonoscopy may 2019 one polyp. \par \par 2/2020 CT Imaging negative for mets, + fatty liver feb 2020. \par \par Cea is 3.8 which is up in may \par \par 6/2020 CT Imaging showed slight increase in adenopathy 8 mm to 1.1 cm \par \par 7/2020 PETCT - no uptake in nodes in retroperitoneum \par \par Foundation testing shows BRCA 2 mutation and also p53 mutation \par \par [facility] - RODOLFO mutation x2(2 diff heterogenous mutations uncertain sig ) , MSH6 heterogenous (undetermined sig) \par \par 10/2020 Mammogram done at [facility] in [place]  was negative\par \par 11/2020 CT Imaging - stable RP nodes , new multiple nodules up to 4 mm in lungs bilaterally , compared to feb 2020 \par \par 2/2021 CT CAP - revealing for progression of pulmonary metastases with slight increase in size and number of previously noted lung nodules, right lower lobe 6 mm up from 3 mm left upper lobe 5 mm up from 3 mm left lower lobe 6 mm up from 3 mm, the previously described retroperitoneal lymphadenopathy is stable at 1.1 cm there are no other areas of disease.\par \par -Patient with progression on Xeloda and Avastin, with worsening lung nodules on the scan in February 2021 switched to  oxaliplatin, Xeloda and Avastin\par -Cycle 2 of oxaliplatin was stopped after less than half of the infusion due to allergic reaction( 3/24/21) \par -then on irinotecan every 3 weeks ( xeloda dc due to toxicity ) \par -CT scan reviewed shows some slight progression in lung nodules but otherwise stable\par  was continued on the irinotecan and Avasti\par \par \par Gen path testing 10/19 : BOBBY. No mutations identified\par Foundation testing--7/1/2020----liquid bx -- somatic  BRCA2 splice site mutation and TP53 mutation. Her2 not amplified\par  [facility] testing does not indicate a BRCA germline, but did find 2 RODOLFO and one MSH6. (all are VUS) \par 6/17/22: Her2 FISH negative\par \par CT scan of the chest abdomen and pelvis done March 14, 2022 at [facility] shows some slight progression in lung nodules including the left upper lobe measuring 1 x 0.7 cm previously 0.8 x 0.6 left lower lobe measuring 1.1 x 0.8 previously 0.8 x 0.6 in the right lower lobe lung nodule 1.2 x 1 cm previously 1 x 0.9.Retroperitoneal adenopathy is stable there are no new areas of disease.\par Imaging consistent with slowly progressive disease, she was changed to [facility] in March 2022.  [de-identified] : 4/13/23: Patient presents for routine follow up today for the management of metastatic CRC for Vectibix and Irinotecan.\par Her appetite has been poor secondary to dysgeusia.  She has had a 19 pound weight loss since January and 3 pounds since last week.\par She is seeing , third week of May to try and desensitize to Oxaliplatin \par \par Consider palliative radiation for pain.  Pt's  would like her to receive chemo first. \par \par 4/6/23: CT c/a/p done on 4/4/23 showed re demonstration of innumerable pulmonary nodules with slight increase in size. Retroperitoneal mass stable. Increased lytic component in L1 and L3  vertebral bodies. Panitumumab and Irinotecan held at her last visit on 3/23/23.  She continues to have an acneform rash and occasional diarrhea. 18 lb weight loss since Jan which she is attributing to diarrhea due to panitumumab. She takes Loperamide sporadically. \par \par \par \par \par \par \par \par  \par

## 2023-04-19 NOTE — ASSESSMENT
[FreeTextEntry1] : 62 year old woman with metastatic colorectal cancer, s/p hemicolectomy and debulking procedure. KRAS, NRAS, BRAF wildtype, MSI stable s/p FOLFOX (neoadjuvant)  \par \par She was switched to Xeloda/Avastin with noted POD and transitioned to oxaliplatin, Xeloda and Avastin (2/2021). Oxaliplatin was discontinued at C2 2/2 to allergic reaction (3/24/21) and switched to irinotecan. Xeloda was held due to toxicity\par \par Repeat CT scan of the chest abdomen pelvis in November 2021 shows some slight increase in size of the lung nodules but overall stability.  No evidence of any other metastatic disease.  Avastin has been on hold due to\par Dental procedures and colonoscopy.   CEA began to rise so Xeloda/Avastin was restarted in 12/2021. \par \par Gen path testing 10/19 : BOBBY. No mutations identified\par Foundation testing--7/1/2020----liquid bx -- somatic  BRCA2 splice site mutation and TP53 mutation. Her2 not amplified\par  [facility] testing does not indicate a BRCA germline, but did find 2 RODOLFO and one MSH6. (all are VUS) \par 6/17/22: Her2 FISH negative\par \par CT scan of the chest abdomen and pelvis done March 14, 2022 at [facility] shows some slight progression in lung nodules including the left upper lobe measuring 1 x 0.7 cm previously 0.8 x 0.6 left lower lobe measuring 1.1 x 0.8 previously 0.8 x 0.6 in the right lower lobe lung nodule 1.2 x 1 cm previously 1 x 0.9.Retroperitoneal adenopathy is stable there are no new areas of disease.Imaging consistent with slowly progressive disease, she was changed to [facility] in March 2022. \par \par 9/26/22 CT CAP imaging which revealed L para-aortic/L psoas mass is slightly increased in size measuring 4 x 2.5 cm. Additionally there is new bony destruction of the adjacent L3 vertebral body measuring 1.5 x 1.8 cm. There is a 1.3 x 1.5 cm subtle are of liver enhancement adjacent to the gallbladder. This is somewhat more pronounced when compared to prior study it is unclear if this represents a true underlying lesion versus perfusion anomaly. Pulmonary nodules are stable to slightly smaller compared to prior study. \par \par Panitumumab started 10/20/22\par \par 1/16/23 CT CAP revealing no change in metastatic disease within the CAP. \par \par Plan:-\par - Reviewed results of CT C/A/P done on 4/4/23 which showed re demonstration of innumerable pulmonary nodules with slight increase in size. Retroperitoneal mass stable. Increased lytic component in L1 and L3  vertebral bodies  scans. Discussed adding Irinotecan back to Panitumumab vs Regorafenib.  Pt would like to hold chemo this week and resume with Panitumumab and Irinotecan.  \par -Offered discussion with nutritionist for weight loss, patient is declining at this time\par -Resumed chemotherapy with panitumumab 6 mg/kg and irinotecan 100 mg/m2.\par -  Acneiform rash  G1, improved. Hydrocortisone 2/5% + clinda gel\par - Dental clearance, needs extractions and denosumab. Rediscussed\par -- Scheduled for appointment with  for oxaliplatin desensitization\par - Continue routine, age-appropriate, healthcare maintenance \par - History of present illness, review of systems, physical exam and treatment plan reviewed with Dr. Verito Martin\par  - Office visit in 1 week or prn for new or worsening symptoms. \par

## 2023-04-19 NOTE — REVIEW OF SYSTEMS
[Fatigue] : fatigue [Abdominal Pain] : abdominal pain [Constipation] : constipation [Diarrhea] : diarrhea [Muscle Pain] : muscle pain [Skin Rash] : skin rash [Negative] : Allergic/Immunologic [Recent Change In Weight] : ~T recent weight change [FreeTextEntry2] : Weight loss of 19 lbs since Jan, 3 lbs in last week. [FreeTextEntry4] : Dysgeusia [FreeTextEntry7] : abdominal cramps [FreeTextEntry9] : +lower back pain [de-identified] : acneform rash

## 2023-04-27 ENCOUNTER — RESULT REVIEW (OUTPATIENT)
Age: 63
End: 2023-04-27

## 2023-04-27 ENCOUNTER — APPOINTMENT (OUTPATIENT)
Dept: HEMATOLOGY ONCOLOGY | Facility: CLINIC | Age: 63
End: 2023-04-27
Payer: COMMERCIAL

## 2023-04-27 VITALS
TEMPERATURE: 98.8 F | SYSTOLIC BLOOD PRESSURE: 79 MMHG | OXYGEN SATURATION: 98 % | WEIGHT: 126 LBS | HEART RATE: 112 BPM | BODY MASS INDEX: 23.79 KG/M2 | HEIGHT: 61 IN | DIASTOLIC BLOOD PRESSURE: 53 MMHG | RESPIRATION RATE: 18 BRPM

## 2023-04-27 DIAGNOSIS — Q80.9 CONGENITAL ICHTHYOSIS, UNSPECIFIED: ICD-10-CM

## 2023-04-27 LAB — CEA SERPL-MCNC: 4.8 NG/ML

## 2023-04-27 PROCEDURE — 99213 OFFICE O/P EST LOW 20 MIN: CPT

## 2023-05-02 PROBLEM — Q80.9 XERODERMA: Status: ACTIVE | Noted: 2022-12-25

## 2023-05-02 NOTE — HISTORY OF PRESENT ILLNESS
[0 - No Distress] : Distress Level: 0 [de-identified] : This is a 62 year old woman with a history of stage 4 colon cancer, KRAS wild type, MSI stable, rectal and sigmoid primary, found to have peritoneal disease at time of initial staging.  She is s/p FOLFIRINOX and Avastin originally, irinotecan dropped for toxicity. She is s/p FOLFOX avastin x6 months. Avastin held 2/2 HTN and she is now on xeloda alone.\par \par Patient underwent resection for the 2 primaries as well as debulking in Oct 2018, with Dr. Parikh at [place].  Pathology revealed, complete response in rectum but residual moderately differentiated adenocarcinoma of sigmoid, omentum positive for adeno and right ovary positive for metastatic colon cancer . No perforation was noted but pos for perineural invasion. \par Rectum ypT1No\par Sigmoid ypT3No M1c \par \par Patient has been YOLIS , colonoscopy may 2019 one polyp. \par \par 2/2020 CT Imaging negative for mets, + fatty liver feb 2020. \par \par Cea is 3.8 which is up in may \par \par 6/2020 CT Imaging showed slight increase in adenopathy 8 mm to 1.1 cm \par \par 7/2020 PETCT - no uptake in nodes in retroperitoneum \par \par Foundation testing shows BRCA 2 mutation and also p53 mutation \par \par [facility] - RODOLFO mutation x2(2 diff heterogenous mutations uncertain sig ) , MSH6 heterogenous (undetermined sig) \par \par 10/2020 Mammogram done at [facility] in [place]  was negative\par \par 11/2020 CT Imaging - stable RP nodes , new multiple nodules up to 4 mm in lungs bilaterally , compared to feb 2020 \par \par 2/2021 CT CAP - revealing for progression of pulmonary metastases with slight increase in size and number of previously noted lung nodules, right lower lobe 6 mm up from 3 mm left upper lobe 5 mm up from 3 mm left lower lobe 6 mm up from 3 mm, the previously described retroperitoneal lymphadenopathy is stable at 1.1 cm there are no other areas of disease.\par \par -Patient with progression on Xeloda and Avastin, with worsening lung nodules on the scan in February 2021 switched to  oxaliplatin, Xeloda and Avastin\par -Cycle 2 of oxaliplatin was stopped after less than half of the infusion due to allergic reaction( 3/24/21) \par -then on irinotecan every 3 weeks ( xeloda dc due to toxicity ) \par -CT scan reviewed shows some slight progression in lung nodules but otherwise stable\par  was continued on the irinotecan and Avasti\par \par \par Gen path testing 10/19 : BOBBY. No mutations identified\par Foundation testing--7/1/2020----liquid bx -- somatic  BRCA2 splice site mutation and TP53 mutation. Her2 not amplified\par  [facility] testing does not indicate a BRCA germline, but did find 2 RODOLFO and one MSH6. (all are VUS) \par 6/17/22: Her2 FISH negative\par \par CT scan of the chest abdomen and pelvis done March 14, 2022 at [facility] shows some slight progression in lung nodules including the left upper lobe measuring 1 x 0.7 cm previously 0.8 x 0.6 left lower lobe measuring 1.1 x 0.8 previously 0.8 x 0.6 in the right lower lobe lung nodule 1.2 x 1 cm previously 1 x 0.9.Retroperitoneal adenopathy is stable there are no new areas of disease.\par Imaging consistent with slowly progressive disease, she was changed to [facility] in March 2022.  [de-identified] : 4/27/23:  Pt presents for follow up with Stage IV rectal cancer for panitumumab/irinotecan, last dose 4/13/2023.\par She continues to have poor appetite secondary to dysgeusia.  She has lost an additional 5 pounds.  She is also having dry skin on her face and abdomen.\par \par 4/13/23: Patient presents for routine follow up today for the management of metastatic CRC for Vectibix and Irinotecan.\par Her appetite has been poor secondary to dysgeusia.  She has had a 19 pound weight loss since January and 3 pounds since last week.\par She is seeing , third week of May to try and desensitize to Oxaliplatin \par \par Consider palliative radiation for pain.  Pt's  would like her to receive chemo first. \par \par 4/6/23: CT c/a/p done on 4/4/23 showed re demonstration of innumerable pulmonary nodules with slight increase in size. Retroperitoneal mass stable. Increased lytic component in L1 and L3  vertebral bodies. Panitumumab and Irinotecan held at her last visit on 3/23/23.  She continues to have an acneform rash and occasional diarrhea. 18 lb weight loss since Jan which she is attributing to diarrhea due to panitumumab. She takes Loperamide sporadically. \par \par \par \par \par \par \par \par  \par

## 2023-05-02 NOTE — ASSESSMENT
[FreeTextEntry1] : 62 year old woman with metastatic colorectal cancer, s/p hemicolectomy and debulking procedure. KRAS, NRAS, BRAF wildtype, MSI stable s/p FOLFOX (neoadjuvant)  \par \par She was switched to Xeloda/Avastin with noted POD and transitioned to oxaliplatin, Xeloda and Avastin (2/2021). Oxaliplatin was discontinued at C2 2/2 to allergic reaction (3/24/21) and switched to irinotecan. Xeloda was held due to toxicity\par \par Repeat CT scan of the chest abdomen pelvis in November 2021 shows some slight increase in size of the lung nodules but overall stability.  No evidence of any other metastatic disease.  Avastin has been on hold due to\par Dental procedures and colonoscopy.   CEA began to rise so Xeloda/Avastin was restarted in 12/2021. \par \par Gen path testing 10/19 : BOBBY. No mutations identified\par Foundation testing--7/1/2020----liquid bx -- somatic  BRCA2 splice site mutation and TP53 mutation. Her2 not amplified\par  [facility] testing does not indicate a BRCA germline, but did find 2 RODOLFO and one MSH6. (all are VUS) \par 6/17/22: Her2 FISH negative\par \par CT scan of the chest abdomen and pelvis done March 14, 2022 at [facility] shows some slight progression in lung nodules including the left upper lobe measuring 1 x 0.7 cm previously 0.8 x 0.6 left lower lobe measuring 1.1 x 0.8 previously 0.8 x 0.6 in the right lower lobe lung nodule 1.2 x 1 cm previously 1 x 0.9.Retroperitoneal adenopathy is stable there are no new areas of disease.Imaging consistent with slowly progressive disease, she was changed to [facility] in March 2022. \par \par 9/26/22 CT CAP imaging which revealed L para-aortic/L psoas mass is slightly increased in size measuring 4 x 2.5 cm. Additionally there is new bony destruction of the adjacent L3 vertebral body measuring 1.5 x 1.8 cm. There is a 1.3 x 1.5 cm subtle are of liver enhancement adjacent to the gallbladder. This is somewhat more pronounced when compared to prior study it is unclear if this represents a true underlying lesion versus perfusion anomaly. Pulmonary nodules are stable to slightly smaller compared to prior study. \par \par Panitumumab started 10/20/22\par \par 1/16/23 CT CAP revealing no change in metastatic disease within the CAP. \par \par Plan:-\par - Will give iV hydration first then Panitumumab and Irinotecan\par - Reviewed results of CT C/A/P done on 4/4/23 which showed re demonstration of innumerable pulmonary nodules with slight increase in size. Retroperitoneal mass stable. Increased lytic component in L1 and L3  vertebral bodies  scans. Discussed adding Irinotecan back to Panitumumab vs Regorafenib.  Pt would like to hold chemo this week and resume with Panitumumab and Irinotecan.  \par - Offered discussion with nutritionist for weight loss, patient is declining at this time\par - Mostly Xeroderma face and abdomen now.  Discussed aggressive skin hydration.   Acneiform rash  G1, improved. Hydrocortisone 2/5% + clinda gel\par - Dental clearance, needs extractions and denosumab. Rediscussed\par -- Scheduled for appointment with  for oxaliplatin desensitization\par - Continue routine, age-appropriate, healthcare maintenance \par - History of present illness, review of systems, physical exam and treatment plan reviewed with Dr. Verito Martin\par  - Office visit in 2 weeks or prn for new or worsening symptoms. \par

## 2023-05-02 NOTE — PHYSICAL EXAM
[Restricted in physically strenuous activity but ambulatory and able to carry out work of a light or sedentary nature] : Status 1- Restricted in physically strenuous activity but ambulatory and able to carry out work of a light or sedentary nature, e.g., light house work, office work [Obese] : obese [Normal] : full range of motion and no deformities appreciated [de-identified] : colostomy with  hernia , non tender no masses  [de-identified] : dry skin face and abdomen [de-identified] : Tardive dyskinesia [de-identified] : very anxious

## 2023-05-02 NOTE — REVIEW OF SYSTEMS
[Fatigue] : fatigue [Abdominal Pain] : abdominal pain [Recent Change In Weight] : ~T recent weight change [Constipation] : constipation [Diarrhea] : diarrhea [Muscle Pain] : muscle pain [Skin Rash] : skin rash [Negative] : Allergic/Immunologic [FreeTextEntry2] : Weight loss of 19 lbs since Jan, 5 lbs in last week. [FreeTextEntry4] : Dysgeusia [FreeTextEntry7] : abdominal cramps [FreeTextEntry9] : +lower back pain [de-identified] : dry rash

## 2023-05-09 LAB — CEA SERPL-MCNC: 15.4 NG/ML

## 2023-05-11 ENCOUNTER — RESULT REVIEW (OUTPATIENT)
Age: 63
End: 2023-05-11

## 2023-05-11 ENCOUNTER — APPOINTMENT (OUTPATIENT)
Dept: HEMATOLOGY ONCOLOGY | Facility: CLINIC | Age: 63
End: 2023-05-11
Payer: COMMERCIAL

## 2023-05-11 VITALS
HEART RATE: 109 BPM | TEMPERATURE: 98.8 F | WEIGHT: 124 LBS | BODY MASS INDEX: 23.41 KG/M2 | SYSTOLIC BLOOD PRESSURE: 85 MMHG | DIASTOLIC BLOOD PRESSURE: 57 MMHG | OXYGEN SATURATION: 98 % | RESPIRATION RATE: 18 BRPM | HEIGHT: 61 IN

## 2023-05-11 DIAGNOSIS — T82.518A: ICD-10-CM

## 2023-05-11 PROCEDURE — 99213 OFFICE O/P EST LOW 20 MIN: CPT

## 2023-05-14 NOTE — PHYSICAL EXAM
[Restricted in physically strenuous activity but ambulatory and able to carry out work of a light or sedentary nature] : Status 1- Restricted in physically strenuous activity but ambulatory and able to carry out work of a light or sedentary nature, e.g., light house work, office work [Obese] : obese [Normal] : full range of motion and no deformities appreciated [de-identified] : colostomy with  hernia , non tender no masses  [de-identified] : dry skin face and abdomen [de-identified] : Tardive dyskinesia [de-identified] : very anxious

## 2023-05-14 NOTE — HISTORY OF PRESENT ILLNESS
[0 - No Distress] : Distress Level: 0 [de-identified] : This is a 62 year old woman with a history of stage 4 colon cancer, KRAS wild type, MSI stable, rectal and sigmoid primary, found to have peritoneal disease at time of initial staging.  She is s/p FOLFIRINOX and Avastin originally, irinotecan dropped for toxicity. She is s/p FOLFOX avastin x6 months. Avastin held 2/2 HTN and she is now on xeloda alone.\par \par Patient underwent resection for the 2 primaries as well as debulking in Oct 2018, with Dr. Parikh at [place].  Pathology revealed, complete response in rectum but residual moderately differentiated adenocarcinoma of sigmoid, omentum positive for adeno and right ovary positive for metastatic colon cancer . No perforation was noted but pos for perineural invasion. \par Rectum ypT1No\par Sigmoid ypT3No M1c \par \par Patient has been YOLIS , colonoscopy may 2019 one polyp. \par \par 2/2020 CT Imaging negative for mets, + fatty liver feb 2020. \par \par Cea is 3.8 which is up in may \par \par 6/2020 CT Imaging showed slight increase in adenopathy 8 mm to 1.1 cm \par \par 7/2020 PETCT - no uptake in nodes in retroperitoneum \par \par Foundation testing shows BRCA 2 mutation and also p53 mutation \par \par [facility] - RODOLFO mutation x2(2 diff heterogenous mutations uncertain sig ) , MSH6 heterogenous (undetermined sig) \par \par 10/2020 Mammogram done at [facility] in [place]  was negative\par \par 11/2020 CT Imaging - stable RP nodes , new multiple nodules up to 4 mm in lungs bilaterally , compared to feb 2020 \par \par 2/2021 CT CAP - revealing for progression of pulmonary metastases with slight increase in size and number of previously noted lung nodules, right lower lobe 6 mm up from 3 mm left upper lobe 5 mm up from 3 mm left lower lobe 6 mm up from 3 mm, the previously described retroperitoneal lymphadenopathy is stable at 1.1 cm there are no other areas of disease.\par \par -Patient with progression on Xeloda and Avastin, with worsening lung nodules on the scan in February 2021 switched to  oxaliplatin, Xeloda and Avastin\par -Cycle 2 of oxaliplatin was stopped after less than half of the infusion due to allergic reaction( 3/24/21) \par -then on irinotecan every 3 weeks ( xeloda dc due to toxicity ) \par -CT scan reviewed shows some slight progression in lung nodules but otherwise stable\par  was continued on the irinotecan and Avasti\par \par \par Gen path testing 10/19 : BOBBY. No mutations identified\par Foundation testing--7/1/2020----liquid bx -- somatic  BRCA2 splice site mutation and TP53 mutation. Her2 not amplified\par  [facility] testing does not indicate a BRCA germline, but did find 2 RODOLFO and one MSH6. (all are VUS) \par 6/17/22: Her2 FISH negative\par \par CT scan of the chest abdomen and pelvis done March 14, 2022 at [facility] shows some slight progression in lung nodules including the left upper lobe measuring 1 x 0.7 cm previously 0.8 x 0.6 left lower lobe measuring 1.1 x 0.8 previously 0.8 x 0.6 in the right lower lobe lung nodule 1.2 x 1 cm previously 1 x 0.9.Retroperitoneal adenopathy is stable there are no new areas of disease.\par Imaging consistent with slowly progressive disease, she was changed to [facility] in March 2022.  [de-identified] : 5/11/23: She presents for follow up of Met Rectal Cancer receiving Panitumumab/Irinotecan, due today. \par She continues to have diarrhea 2-3 daily, and takes Loperamide three times daily. Her skin is dry, particularly on her face and abdomen but it is improved. Her appetite has improved although she has lost two more pounds. \par \par 4/27/23:  Pt presents for follow up with Stage IV rectal cancer for panitumumab/irinotecan, last dose 4/13/2023.\par She continues to have poor appetite secondary to dysgeusia.  She has lost an additional 5 pounds.  She is also having dry skin on her face and abdomen.\par \par 4/13/23: Patient presents for routine follow up today for the management of metastatic CRC for Vectibix and Irinotecan.\par Her appetite has been poor secondary to dysgeusia.  She has had a 19 pound weight loss since January and 3 pounds since last week.\par She is seeing , third week of May to try and desensitize to Oxaliplatin \par \par Consider palliative radiation for pain.  Pt's  would like her to receive chemo first. \par \par 4/6/23: CT c/a/p done on 4/4/23 showed re demonstration of innumerable pulmonary nodules with slight increase in size. Retroperitoneal mass stable. Increased lytic component in L1 and L3  vertebral bodies. Panitumumab and Irinotecan held at her last visit on 3/23/23.  She continues to have an acneform rash and occasional diarrhea. 18 lb weight loss since Jan which she is attributing to diarrhea due to panitumumab. She takes Loperamide sporadically. \par \par \par \par \par \par \par \par  \par

## 2023-05-14 NOTE — REVIEW OF SYSTEMS
[Fatigue] : fatigue [Recent Change In Weight] : ~T recent weight change [Abdominal Pain] : abdominal pain [Muscle Pain] : muscle pain [Skin Rash] : skin rash [Negative] : Allergic/Immunologic [Diarrhea: Grade 1 - Increase of <4 stools per day over baseline; mild increase in ostomy output compared to baseline] : Diarrhea: Grade 1 - Increase of <4 stools per day over baseline; mild increase in ostomy output compared to baseline [Constipation] : no constipation [FreeTextEntry2] : Weight loss of 2 lbs in one week. [FreeTextEntry4] : Dysgeusia [FreeTextEntry7] : abdominal cramps [FreeTextEntry9] : +lower back pain [de-identified] : dry rash, face and abdomen

## 2023-05-14 NOTE — ASSESSMENT
[FreeTextEntry1] : 62 year old woman with metastatic colorectal cancer, s/p hemicolectomy and debulking procedure. KRAS, NRAS, BRAF wildtype, MSI stable s/p FOLFOX (neoadjuvant)  \par \par She was switched to Xeloda/Avastin with noted POD and transitioned to oxaliplatin, Xeloda and Avastin (2/2021). Oxaliplatin was discontinued at C2 2/2 to allergic reaction (3/24/21) and switched to irinotecan. Xeloda was held due to toxicity\par \par Repeat CT scan of the chest abdomen pelvis in November 2021 shows some slight increase in size of the lung nodules but overall stability.  No evidence of any other metastatic disease.  Avastin has been on hold due to\par Dental procedures and colonoscopy.   CEA began to rise so Xeloda/Avastin was restarted in 12/2021. \par \par Gen path testing 10/19 : BOBBY. No mutations identified\par Foundation testing--7/1/2020----liquid bx -- somatic  BRCA2 splice site mutation and TP53 mutation. Her2 not amplified\par  [facility] testing does not indicate a BRCA germline, but did find 2 RODOLFO and one MSH6. (all are VUS) \par 6/17/22: Her2 FISH negative\par \par CT scan of the chest abdomen and pelvis done March 14, 2022 at [facility] shows some slight progression in lung nodules including the left upper lobe measuring 1 x 0.7 cm previously 0.8 x 0.6 left lower lobe measuring 1.1 x 0.8 previously 0.8 x 0.6 in the right lower lobe lung nodule 1.2 x 1 cm previously 1 x 0.9.Retroperitoneal adenopathy is stable there are no new areas of disease.Imaging consistent with slowly progressive disease, she was changed to [place] in March 2022. \par \par 9/26/22 CT CAP imaging which revealed L para-aortic/L psoas mass is slightly increased in size measuring 4 x 2.5 cm. Additionally there is new bony destruction of the adjacent L3 vertebral body measuring 1.5 x 1.8 cm. There is a 1.3 x 1.5 cm subtle are of liver enhancement adjacent to the gallbladder. This is somewhat more pronounced when compared to prior study it is unclear if this represents a true underlying lesion versus perfusion anomaly. Pulmonary nodules are stable to slightly smaller compared to prior study. \par \par Panitumumab started 10/20/22\par \par 1/16/23 CT CAP revealing no change in metastatic disease within the CAP. \par \par Plan:-\par - Will give iV hydration first then Panitumumab and Irinotecan\par - Hypokalemia and hypomagnesemia-will supplement IV Mg and K. \par - Reviewed results of CT C/A/P done on 4/4/23 which showed re demonstration of innumerable pulmonary nodules with slight increase in size. Retroperitoneal mass stable. Increased lytic component in L1 and L3  vertebral bodies  scans. Discussed adding Irinotecan back to Panitumumab vs Regorafenib. \par - Continue current chemo, will reassess with next scan. \par - Offered discussion with nutritionist for weight loss, patient is declining at this time\par - Mostly Xeroderma face and abdomen now.  Discussed aggressive skin hydration.   Acneiform rash  G1, improved. Hydrocortisone 2/5% + clinda gel\par - Dental clearance for denosumab, needs extractions and . Rediscussed\par -- Scheduled for appointment with  for oxaliplatin desensitization\par - Added Lomotil for diarrhea-sent to pharmacy\par - Continue routine, age-appropriate, healthcare maintenance \par - History of present illness, review of systems, physical exam and treatment plan reviewed with Dr. Verito Martin\par  - Office visit in 2 weeks or prn for new or worsening symptoms. \par

## 2023-05-25 ENCOUNTER — APPOINTMENT (OUTPATIENT)
Dept: HEMATOLOGY ONCOLOGY | Facility: CLINIC | Age: 63
End: 2023-05-25
Payer: COMMERCIAL

## 2023-05-25 ENCOUNTER — RESULT REVIEW (OUTPATIENT)
Age: 63
End: 2023-05-25

## 2023-05-25 VITALS
OXYGEN SATURATION: 100 % | HEART RATE: 93 BPM | BODY MASS INDEX: 23.6 KG/M2 | TEMPERATURE: 98.4 F | WEIGHT: 125 LBS | RESPIRATION RATE: 18 BRPM | HEIGHT: 61 IN

## 2023-05-25 PROCEDURE — 36415 COLL VENOUS BLD VENIPUNCTURE: CPT

## 2023-05-25 PROCEDURE — 99213 OFFICE O/P EST LOW 20 MIN: CPT | Mod: 25

## 2023-05-29 NOTE — ASSESSMENT
[FreeTextEntry1] : 62 year old woman with metastatic colorectal cancer, s/p hemicolectomy and debulking procedure. KRAS, NRAS, BRAF wildtype, MSI stable s/p FOLFOX (neoadjuvant)  \par \par She was switched to Xeloda/Avastin with noted POD and transitioned to oxaliplatin, Xeloda and Avastin (2/2021). Oxaliplatin was discontinued at C2 2/2 to allergic reaction (3/24/21) and switched to irinotecan. Xeloda was held due to toxicity\par \par Repeat CT scan of the chest abdomen pelvis in November 2021 shows some slight increase in size of the lung nodules but overall stability.  No evidence of any other metastatic disease.  Avastin has been on hold due to\par Dental procedures and colonoscopy.   CEA began to rise so Xeloda/Avastin was restarted in 12/2021. \par \par Gen path testing 10/19 : BOBBY. No mutations identified\par Foundation testing--7/1/2020----liquid bx -- somatic  BRCA2 splice site mutation and TP53 mutation. Her2 not amplified\par  [facility] testing does not indicate a BRCA germline, but did find 2 RODOLFO and one MSH6. (all are VUS) \par 6/17/22: Her2 FISH negative\par \par CT scan of the chest abdomen and pelvis done March 14, 2022 at [facility] shows some slight progression in lung nodules including the left upper lobe measuring 1 x 0.7 cm previously 0.8 x 0.6 left lower lobe measuring 1.1 x 0.8 previously 0.8 x 0.6 in the right lower lobe lung nodule 1.2 x 1 cm previously 1 x 0.9.Retroperitoneal adenopathy is stable there are no new areas of disease.Imaging consistent with slowly progressive disease, she was changed to [place] in March 2022. \par \par 9/26/22 CT CAP imaging which revealed L para-aortic/L psoas mass is slightly increased in size measuring 4 x 2.5 cm. Additionally there is new bony destruction of the adjacent L3 vertebral body measuring 1.5 x 1.8 cm. There is a 1.3 x 1.5 cm subtle are of liver enhancement adjacent to the gallbladder. This is somewhat more pronounced when compared to prior study it is unclear if this represents a true underlying lesion versus perfusion anomaly. Pulmonary nodules are stable to slightly smaller compared to prior study. \par \par Panitumumab started 10/20/22\par \par 1/16/23 CT CAP revealing no change in metastatic disease within the CAP. \par 4/4/23--mild progression\par \par Plan:-\par -continue panitumumab + irinotecan \par -  Acneiform rash  G1, improved. Hydrocortisone 2/5% + clinda gel\par - Dental clearance, needs extractions and denosumab. rEdiscussed\par --  wt. loss --f/u CEA/imaging ?progression. Discussed options with patient.  Refer to allergy for oxaliplatin desensitization, in future\par -Replete Magnesium\par \par Labs ordered, drawn in the office and reviewed.\par Next visit will order CBC with diff, CMP,Magnesium\par \par RTC 1 week\par

## 2023-05-29 NOTE — PHYSICAL EXAM
[Restricted in physically strenuous activity but ambulatory and able to carry out work of a light or sedentary nature] : Status 1- Restricted in physically strenuous activity but ambulatory and able to carry out work of a light or sedentary nature, e.g., light house work, office work [Obese] : obese [Normal] : grossly intact [de-identified] : ulcer over border of right tongue [de-identified] : colostomy with  hernia , non tender no masses  [de-identified] : acneiform rash over the cheeks [de-identified] : very anxious

## 2023-05-29 NOTE — HISTORY OF PRESENT ILLNESS
[0 - No Distress] : Distress Level: 0 [de-identified] : This is a 62 year old woman with a history of stage 4 colon cancer, KRAS wild type, MSI stable, rectal and sigmoid primary, found to have peritoneal disease at time of initial staging.  She is s/p FOLFIRINOX and Avastin originally, irinotecan dropped for toxicity. She is s/p FOLFOX avastin x6 months. Avastin held 2/2 HTN and she is now on xeloda alone.\par \par Patient underwent resection for the 2 primaries as well as debulking in Oct 2018, with Dr. Parikh at [place].  Pathology revealed, complete response in rectum but residual moderately differentiated adenocarcinoma of sigmoid, omentum positive for adeno and right ovary positive for metastatic colon cancer . No perforation was noted but pos for perineural invasion. \par Rectum ypT1No\par Sigmoid ypT3No M1c \par \par Patient has been YOLIS , colonoscopy may 2019 one polyp. \par \par 2/2020 CT Imaging negative for mets, + fatty liver feb 2020. \par \par Cea is 3.8 which is up in may \par \par 6/2020 CT Imaging showed slight increase in adenopathy 8 mm to 1.1 cm \par \par 7/2020 PETCT - no uptake in nodes in retroperitoneum \par \par Foundation testing shows BRCA 2 mutation and also p53 mutation \par \par [facility] - RODOLFO mutation x2(2 diff heterogenous mutations uncertain sig ) , MSH6 heterogenous (undetermined sig) \par \par 10/2020 Mammogram done at [facility] in [place]  was negative\par \par 11/2020 CT Imaging - stable RP nodes , new multiple nodules up to 4 mm in lungs bilaterally , compared to feb 2020 \par \par 2/2021 CT CAP - revealing for progression of pulmonary metastases with slight increase in size and number of previously noted lung nodules, right lower lobe 6 mm up from 3 mm left upper lobe 5 mm up from 3 mm left lower lobe 6 mm up from 3 mm, the previously described retroperitoneal lymphadenopathy is stable at 1.1 cm there are no other areas of disease.\par \par -Patient with progression on Xeloda and Avastin, with worsening lung nodules on the scan in February 2021 switched to  oxaliplatin, Xeloda and Avastin\par -Cycle 2 of oxaliplatin was stopped after less than half of the infusion due to allergic reaction( 3/24/21) \par -then on irinotecan every 3 weeks ( xeloda dc due to toxicity ) \par -CT scan reviewed shows some slight progression in lung nodules but otherwise stable\par  was continued on the irinotecan and Avasti\par \par \par Gen path testing 10/19 : BOBBY. No mutations identified\par Foundation testing--7/1/2020----liquid bx -- somatic  BRCA2 splice site mutation and TP53 mutation. Her2 not amplified\par  [facility] testing does not indicate a BRCA germline, but did find 2 RODOLFO and one MSH6. (all are VUS) \par 6/17/22: Her2 FISH negative\par \par CT scan of the chest abdomen and pelvis done March 14, 2022 at [facility] shows some slight progression in lung nodules including the left upper lobe measuring 1 x 0.7 cm previously 0.8 x 0.6 left lower lobe measuring 1.1 x 0.8 previously 0.8 x 0.6 in the right lower lobe lung nodule 1.2 x 1 cm previously 1 x 0.9.Retroperitoneal adenopathy is stable there are no new areas of disease.\par Imaging consistent with slowly progressive disease, she was changed to [facility] in March 2022.  [de-identified] : Patient presents for routine follow up today for the management of metastatic CRC. \par She remains on Panitumumab due today. Irenotecan held. \par CT c/a/p : 1/16/23: No change in metastatic diseae --multiple bilateral spiculated pulmonary nodules compatible with diffuse pulmonary metastasis. These are stable. Left psoas and retroperitoneal massis stable. Adjacent destruction of L3 vertebral body\par She will also be scheduled for a colonoscopy with . \par \par \par PAtient with G2-3 diarrhea\par Attributing wt. loss to diarrhea due to panitumumab\par \par \par \par \par  \par

## 2023-06-08 ENCOUNTER — APPOINTMENT (OUTPATIENT)
Dept: HEMATOLOGY ONCOLOGY | Facility: CLINIC | Age: 63
End: 2023-06-08
Payer: COMMERCIAL

## 2023-06-08 ENCOUNTER — RESULT REVIEW (OUTPATIENT)
Age: 63
End: 2023-06-08

## 2023-06-08 VITALS
OXYGEN SATURATION: 100 % | HEART RATE: 121 BPM | WEIGHT: 124 LBS | RESPIRATION RATE: 18 BRPM | BODY MASS INDEX: 23.41 KG/M2 | DIASTOLIC BLOOD PRESSURE: 57 MMHG | SYSTOLIC BLOOD PRESSURE: 89 MMHG | HEIGHT: 61 IN | TEMPERATURE: 98.7 F

## 2023-06-08 PROCEDURE — 99213 OFFICE O/P EST LOW 20 MIN: CPT

## 2023-06-08 NOTE — REVIEW OF SYSTEMS
[Fatigue] : fatigue [Abdominal Pain] : abdominal pain [Constipation] : constipation [Muscle Pain] : muscle pain [Negative] : Allergic/Immunologic [Diarrhea: Grade 1 - Increase of <4 stools per day over baseline; mild increase in ostomy output compared to baseline] : Diarrhea: Grade 1 - Increase of <4 stools per day over baseline; mild increase in ostomy output compared to baseline [Skin Rash] : skin rash

## 2023-06-08 NOTE — ASSESSMENT
[FreeTextEntry1] : 62 year old woman with metastatic colorectal cancer, s/p hemicolectomy and debulking procedure. KRAS, NRAS, BRAF wildtype, MSI stable s/p FOLFOX (neoadjuvant)  \par \par She was switched to Xeloda/Avastin with noted POD and transitioned to oxaliplatin, Xeloda and Avastin (2/2021). Oxaliplatin was discontinued at C2 2/2 to allergic reaction (3/24/21) and switched to irinotecan. Xeloda was held due to toxicity\par \par Repeat CT scan of the chest abdomen pelvis in November 2021 shows some slight increase in size of the lung nodules but overall stability.  No evidence of any other metastatic disease.  Avastin has been on hold due to\par Dental procedures and colonoscopy.   CEA began to rise so Xeloda/Avastin was restarted in 12/2021. \par \par Gen path testing 10/19 : BOBBY. No mutations identified\par Foundation testing--7/1/2020----liquid bx -- somatic  BRCA2 splice site mutation and TP53 mutation. Her2 not amplified\par  [facility] testing does not indicate a BRCA germline, but did find 2 RODOLFO and one MSH6. (all are VUS) \par 6/17/22: Her2 FISH negative\par \par CT scan of the chest abdomen and pelvis done March 14, 2022 at [facility] shows some slight progression in lung nodules including the left upper lobe measuring 1 x 0.7 cm previously 0.8 x 0.6 left lower lobe measuring 1.1 x 0.8 previously 0.8 x 0.6 in the right lower lobe lung nodule 1.2 x 1 cm previously 1 x 0.9.Retroperitoneal adenopathy is stable there are no new areas of disease.Imaging consistent with slowly progressive disease, she was changed to [facility] in March 2022. \par \par 9/26/22 CT CAP imaging which revealed L para-aortic/L psoas mass is slightly increased in size measuring 4 x 2.5 cm. Additionally there is new bony destruction of the adjacent L3 vertebral body measuring 1.5 x 1.8 cm. There is a 1.3 x 1.5 cm subtle are of liver enhancement adjacent to the gallbladder. This is somewhat more pronounced when compared to prior study it is unclear if this represents a true underlying lesion versus perfusion anomaly. Pulmonary nodules are stable to slightly smaller compared to prior study. \par \par Panitumumab started 10/20/22\par \par 1/16/23 CT CAP revealing no change in metastatic disease within the CAP. \par 4/4/23--mild progression\par \par Plan:-\par - resume panitumumab + irinotecan \par -  Acneiform rash  G1, improved. Continue Hydrocortisone 2/5% + clinda gel\par - Dental clearance, needs extractions and denosumab. rEdiscussed\par -- wt. loss, referred to nutritionist Tara Olson and to Dr. Pederson's office for appetite stimulation.--f/u CEA/imaging ?progression. Discussed options with patient.  Reimage in July. Refer to allergy for oxaliplatin desensitization, in future\par -Replete potassium and magnesium\par -Discussed management of diarrhea patient is advised on use of loperamide and Lomotil\par -Labs ordered, drawn in the office and reviewed.\par - Continue routine, age-appropriate, healthcare maintenance \par History of present illness, review of systems, physical exam and treatment plan reviewed with Dr. Verito Martin\par  Office visit in 2 weeks or prn for new or worsening symptoms. \par \par

## 2023-06-08 NOTE — HISTORY OF PRESENT ILLNESS
[0 - No Distress] : Distress Level: 0 [de-identified] : This is a 62 year old woman with a history of stage 4 colon cancer, KRAS wild type, MSI stable, rectal and sigmoid primary, found to have peritoneal disease at time of initial staging.  She is s/p FOLFIRINOX and Avastin originally, irinotecan dropped for toxicity. She is s/p FOLFOX avastin x6 months. Avastin held 2/2 HTN and she is now on xeloda alone.\par \par Patient underwent resection for the 2 primaries as well as debulking in Oct 2018, with Dr. Parikh at [place].  Pathology revealed, complete response in rectum but residual moderately differentiated adenocarcinoma of sigmoid, omentum positive for adeno and right ovary positive for metastatic colon cancer . No perforation was noted but pos for perineural invasion. \par Rectum ypT1No\par Sigmoid ypT3No M1c \par \par Patient has been YOLIS , colonoscopy may 2019 one polyp. \par \par 2/2020 CT Imaging negative for mets, + fatty liver feb 2020. \par \par Cea is 3.8 which is up in may \par \par 6/2020 CT Imaging showed slight increase in adenopathy 8 mm to 1.1 cm \par \par 7/2020 PETCT - no uptake in nodes in retroperitoneum \par \par Foundation testing shows BRCA 2 mutation and also p53 mutation \par \par [facility] - RODOLFO mutation x2(2 diff heterogenous mutations uncertain sig ) , MSH6 heterogenous (undetermined sig) \par \par 10/2020 Mammogram done at [facility] in [place]  was negative\par \par 11/2020 CT Imaging - stable RP nodes , new multiple nodules up to 4 mm in lungs bilaterally , compared to feb 2020 \par \par 2/2021 CT CAP - revealing for progression of pulmonary metastases with slight increase in size and number of previously noted lung nodules, right lower lobe 6 mm up from 3 mm left upper lobe 5 mm up from 3 mm left lower lobe 6 mm up from 3 mm, the previously described retroperitoneal lymphadenopathy is stable at 1.1 cm there are no other areas of disease.\par \par -Patient with progression on Xeloda and Avastin, with worsening lung nodules on the scan in February 2021 switched to  oxaliplatin, Xeloda and Avastin\par -Cycle 2 of oxaliplatin was stopped after less than half of the infusion due to allergic reaction( 3/24/21) \par -then on irinotecan every 3 weeks ( xeloda dc due to toxicity ) \par -CT scan reviewed shows some slight progression in lung nodules but otherwise stable\par  was continued on the irinotecan and Avasti\par \par \par Gen path testing 10/19 : BOBBY. No mutations identified\par Foundation testing--7/1/2020----liquid bx -- somatic  BRCA2 splice site mutation and TP53 mutation. Her2 not amplified\par  [facility] testing does not indicate a BRCA germline, but did find 2 RODOLFO and one MSH6. (all are VUS) \par 6/17/22: Her2 FISH negative\par \par CT scan of the chest abdomen and pelvis done March 14, 2022 at [facility] shows some slight progression in lung nodules including the left upper lobe measuring 1 x 0.7 cm previously 0.8 x 0.6 left lower lobe measuring 1.1 x 0.8 previously 0.8 x 0.6 in the right lower lobe lung nodule 1.2 x 1 cm previously 1 x 0.9.Retroperitoneal adenopathy is stable there are no new areas of disease.\par Imaging consistent with slowly progressive disease, she was changed to [facility] in March 2022.  [de-identified] : Patient presents for routine follow up today for the management of metastatic CRC. \par She will be receiving Panitumumab and  Irinotecan.  She continues to have watery BM when she has BMs. Her appetite is poor because of dysgeusia, although she receives medical marijuana for appetite. \par \par \par \par \par \par \par  \par

## 2023-06-08 NOTE — PHYSICAL EXAM
[Restricted in physically strenuous activity but ambulatory and able to carry out work of a light or sedentary nature] : Status 1- Restricted in physically strenuous activity but ambulatory and able to carry out work of a light or sedentary nature, e.g., light house work, office work [Obese] : obese [Normal] : grossly intact [de-identified] : colostomy with  hernia , non tender no masses  [de-identified] : acneiform rash over the cheeks [de-identified] : very anxious

## 2023-06-09 ENCOUNTER — NON-APPOINTMENT (OUTPATIENT)
Age: 63
End: 2023-06-09

## 2023-06-14 ENCOUNTER — APPOINTMENT (OUTPATIENT)
Dept: GERIATRICS | Facility: CLINIC | Age: 63
End: 2023-06-14
Payer: COMMERCIAL

## 2023-06-14 VITALS
WEIGHT: 123.56 LBS | BODY MASS INDEX: 23.33 KG/M2 | HEIGHT: 61 IN | SYSTOLIC BLOOD PRESSURE: 97 MMHG | TEMPERATURE: 96.8 F | HEART RATE: 114 BPM | DIASTOLIC BLOOD PRESSURE: 58 MMHG | OXYGEN SATURATION: 97 % | RESPIRATION RATE: 18 BRPM

## 2023-06-14 DIAGNOSIS — R11.2 NAUSEA WITH VOMITING, UNSPECIFIED: ICD-10-CM

## 2023-06-14 PROCEDURE — 99204 OFFICE O/P NEW MOD 45 MIN: CPT

## 2023-06-14 RX ORDER — ADHESIVE TAPE 3"X 2.3 YD
50 MCG TAPE, NON-MEDICATED TOPICAL
Refills: 0 | Status: DISCONTINUED | COMMUNITY
End: 2023-06-14

## 2023-06-14 RX ORDER — CLINDAMYCIN PHOSPHATE 10 MG/ML
1 LOTION TOPICAL TWICE DAILY
Qty: 1 | Refills: 0 | Status: DISCONTINUED | COMMUNITY
Start: 2020-09-10 | End: 2023-06-14

## 2023-06-14 RX ORDER — CYANOCOBALAMIN (VITAMIN B-12) 1000 MCG
1000 TABLET ORAL
Refills: 0 | Status: DISCONTINUED | COMMUNITY
End: 2023-06-14

## 2023-06-14 RX ORDER — CLONAZEPAM 0.5 MG/1
0.5 TABLET ORAL
Refills: 0 | Status: ACTIVE | COMMUNITY
Start: 2022-05-04

## 2023-06-14 RX ORDER — FLUOCINONIDE 0.05 MG/G
0.05 OINTMENT TOPICAL TWICE DAILY
Qty: 1 | Refills: 0 | Status: DISCONTINUED | COMMUNITY
Start: 2020-09-10 | End: 2023-06-14

## 2023-06-14 RX ORDER — PAROXETINE HYDROCHLORIDE 40 MG/1
40 TABLET, FILM COATED ORAL DAILY
Refills: 0 | Status: ACTIVE | COMMUNITY

## 2023-06-14 RX ORDER — ONDANSETRON 8 MG/1
8 TABLET ORAL
Qty: 30 | Refills: 5 | Status: ACTIVE | COMMUNITY
Start: 2021-08-18

## 2023-06-14 RX ORDER — QUETIAPINE FUMARATE 50 MG/1
50 TABLET ORAL 3 TIMES DAILY
Refills: 0 | Status: ACTIVE | COMMUNITY

## 2023-06-14 NOTE — HISTORY OF PRESENT ILLNESS
[FreeTextEntry1] : Orin Weinberg is a 61 y/o F w/ metastatic colon CA s/p resection/debulking '18 w/ end colostomy currently on Panitumumab and Irinotecan who presents today with her  to establish primary palliative care\par \par CT A/P/Chest 4/4/23 --> innumerable b/l pulmonary nodules, L RP mass, diffuse lytic/sclerotic lesions most pronounced in the L1 and L3 vertebral bodies\par Pending re-imaging in July\par \par Biggest complaint is poor appetite. They try to get 2 high calorie drinks (Boost, etc) in per day but other than that- very little supplemental. \par \par Sleeps a lot during the day, not a lot at night- not tired. \par \par + Lower back pain, doesn't radiate. Takes Tylenol/Aleve 1-2 per day, takes the edge off. Is in pain constantly. \par \par Hobbies including crafting, not able to do much anymore. Walks around the house but uses wheelchair outside. Overall, this past year has been has been the worst since diagnosis in terms of overall weakness. \par \par + Diarrhea. "Ponce to keep her hydrated"\par \par Cannabis helps with nausea. \par \par [place] (): [phone]\par 2, 21 year old children who live at home and work locally (not twins)

## 2023-06-14 NOTE — ASSESSMENT
[FreeTextEntry1] : ABAD  [id number]\par \par Cancer related pain\par - Start Oxy 5mg TID PRN\par - Start Oxy 5 or 10mg QHS EVELYN\par - Narcan PRN ordered\par \par OIC Prevention\par - No laxatives yet; s/p colostomy now with diarrhea 2/2 treatment\par - Advised to hold Imodium PRN for now\par \par Dysgeusia \par - Cannabis\par - May start Remeron to further increase appetite, pending callback from psychiatrist to discuss\par \par Insomnia\par - Sleepy hygiene reviewed\par - Remeron would also help with this- pending callback from psychiatrist \par \par Nausea/vomiting\par - C/w Zofran 8mg daily PRN\par - C/w Reglan 5mg daily PRN\par - C/w cannabis\par \par ACP\par - Discussed code status options and importance of ACP- they will think about it\par - They are aware patient has stage IV cancer and is not curable\par \par RTC in 2 weeks or PRN sooner for TEB for symptom check on pain\par \par Requires skilled RN visit for education on new medication (Oxycodone), discussion of side effects, monitoring of vitals to r/o hypotension, discussion of falls prevention as a result of taking a narcotic.

## 2023-06-14 NOTE — PHYSICAL EXAM
[Normal Oral Mucosa] : normal oral mucosa [Neck Appearance] : the appearance of the neck was normal [] : no respiratory distress [Heart Sounds] : normal S1 and S2 [FreeTextEntry1] : Quiet, flat affect

## 2023-06-16 ENCOUNTER — NON-APPOINTMENT (OUTPATIENT)
Age: 63
End: 2023-06-16

## 2023-06-21 ENCOUNTER — NON-APPOINTMENT (OUTPATIENT)
Age: 63
End: 2023-06-21

## 2023-06-22 ENCOUNTER — RESULT REVIEW (OUTPATIENT)
Age: 63
End: 2023-06-22

## 2023-06-22 ENCOUNTER — APPOINTMENT (OUTPATIENT)
Dept: HEMATOLOGY ONCOLOGY | Facility: CLINIC | Age: 63
End: 2023-06-22
Payer: COMMERCIAL

## 2023-06-22 VITALS
TEMPERATURE: 99.2 F | HEIGHT: 61 IN | BODY MASS INDEX: 23.41 KG/M2 | RESPIRATION RATE: 18 BRPM | HEART RATE: 105 BPM | DIASTOLIC BLOOD PRESSURE: 57 MMHG | WEIGHT: 124 LBS | SYSTOLIC BLOOD PRESSURE: 83 MMHG | OXYGEN SATURATION: 96 %

## 2023-06-22 DIAGNOSIS — L70.8 OTHER ACNE: ICD-10-CM

## 2023-06-22 LAB — CEA SERPL-MCNC: 18.2 NG/ML

## 2023-06-22 PROCEDURE — 99213 OFFICE O/P EST LOW 20 MIN: CPT

## 2023-06-22 NOTE — REVIEW OF SYSTEMS
[Fatigue] : fatigue [Abdominal Pain] : abdominal pain [Constipation] : constipation [Diarrhea: Grade 1 - Increase of <4 stools per day over baseline; mild increase in ostomy output compared to baseline] : Diarrhea: Grade 1 - Increase of <4 stools per day over baseline; mild increase in ostomy output compared to baseline [Muscle Pain] : muscle pain [Skin Rash] : skin rash [Negative] : Allergic/Immunologic

## 2023-06-23 PROBLEM — L70.8 ACNEIFORM RASH: Status: ACTIVE | Noted: 2022-11-07

## 2023-06-23 NOTE — ASSESSMENT
[FreeTextEntry1] : 62 year old woman with metastatic colorectal cancer, s/p hemicolectomy and debulking procedure. KRAS, NRAS, BRAF wildtype, MSI stable s/p FOLFOX (neoadjuvant)  \par \par She was switched to Xeloda/Avastin with noted POD and transitioned to oxaliplatin, Xeloda and Avastin (2/2021). Oxaliplatin was discontinued at C2 2/2 to allergic reaction (3/24/21) and switched to irinotecan. Xeloda was held due to toxicity\par \par Repeat CT scan of the chest abdomen pelvis in November 2021 shows some slight increase in size of the lung nodules but overall stability.  No evidence of any other metastatic disease.  Avastin has been on hold due to\par Dental procedures and colonoscopy.   CEA began to rise so Xeloda/Avastin was restarted in 12/2021. \par \par Gen path testing 10/19 : BOBBY. No mutations identified\par Foundation testing--7/1/2020----liquid bx -- somatic  BRCA2 splice site mutation and TP53 mutation. Her2 not amplified\par  [facility] testing does not indicate a BRCA germline, but did find 2 RODOLFO and one MSH6. (all are VUS) \par 6/17/22: Her2 FISH negative\par \par CT scan of the chest abdomen and pelvis done March 14, 2022 at [facility] shows some slight progression in lung nodules including the left upper lobe measuring 1 x 0.7 cm previously 0.8 x 0.6 left lower lobe measuring 1.1 x 0.8 previously 0.8 x 0.6 in the right lower lobe lung nodule 1.2 x 1 cm previously 1 x 0.9.Retroperitoneal adenopathy is stable there are no new areas of disease.Imaging consistent with slowly progressive disease, she was changed to [facility] in March 2022. \par \par 9/26/22 CT CAP imaging which revealed L para-aortic/L psoas mass is slightly increased in size measuring 4 x 2.5 cm. Additionally there is new bony destruction of the adjacent L3 vertebral body measuring 1.5 x 1.8 cm. There is a 1.3 x 1.5 cm subtle are of liver enhancement adjacent to the gallbladder. This is somewhat more pronounced when compared to prior study it is unclear if this represents a true underlying lesion versus perfusion anomaly. Pulmonary nodules are stable to slightly smaller compared to prior study. \par \par Panitumumab started 10/20/22\par \par 1/16/23 CT CAP revealing no change in metastatic disease within the CAP. \par 4/4/23--mild progression\par \par Plan:\par -For panitumumab irinotecan today\par - Rescan with CT chest abdomen pelvis after July 5\par - Hydration with normal saline today for hyponatremia with the addition of mag sulfate 2 g for hypomagnesemia.\par - Referral to rad onc for palliative radiation\par -  Acneiform rash  G1, improved. Continue Hydrocortisone 2/5% + clinda gel\par - Dental clearance, needs extractions and denosumab. rEdiscussed\par -- wt. loss, continue f/up with nutritionist Tara Olson and palliative med for appetite stimulation.. Refer to allergy for oxaliplatin desensitization, in future\par -Discussed management of diarrhea patient is advised on use of loperamide and Lomotil\par -Labs ordered, drawn in the office and reviewed.\par - Continue routine, age-appropriate, healthcare maintenance \par History of present illness, review of systems, physical exam and treatment plan reviewed with Dr. Verito Martin\par  Office visit in 2 weeks or prn for new or worsening symptoms. \par \par

## 2023-06-23 NOTE — HISTORY OF PRESENT ILLNESS
[0 - No Distress] : Distress Level: 0 [de-identified] : This is a 62 year old woman with a history of stage 4 colon cancer, KRAS wild type, MSI stable, rectal and sigmoid primary, found to have peritoneal disease at time of initial staging.  She is s/p FOLFIRINOX and Avastin originally, irinotecan dropped for toxicity. She is s/p FOLFOX avastin x6 months. Avastin held 2/2 HTN and she is now on xeloda alone.\par \par Patient underwent resection for the 2 primaries as well as debulking in Oct 2018, with Dr. Parikh at [place].  Pathology revealed, complete response in rectum but residual moderately differentiated adenocarcinoma of sigmoid, omentum positive for adeno and right ovary positive for metastatic colon cancer . No perforation was noted but pos for perineural invasion. \par Rectum ypT1No\par Sigmoid ypT3No M1c \par \par Patient has been YOLIS , colonoscopy may 2019 one polyp. \par \par 2/2020 CT Imaging negative for mets, + fatty liver feb 2020. \par \par Cea is 3.8 which is up in may \par \par 6/2020 CT Imaging showed slight increase in adenopathy 8 mm to 1.1 cm \par \par 7/2020 PETCT - no uptake in nodes in retroperitoneum \par \par Foundation testing shows BRCA 2 mutation and also p53 mutation \par \par [facility] - RODOLFO mutation x2(2 diff heterogenous mutations uncertain sig ) , MSH6 heterogenous (undetermined sig) \par \par 10/2020 Mammogram done at [facility] in [place]  was negative\par \par 11/2020 CT Imaging - stable RP nodes , new multiple nodules up to 4 mm in lungs bilaterally , compared to feb 2020 \par \par 2/2021 CT CAP - revealing for progression of pulmonary metastases with slight increase in size and number of previously noted lung nodules, right lower lobe 6 mm up from 3 mm left upper lobe 5 mm up from 3 mm left lower lobe 6 mm up from 3 mm, the previously described retroperitoneal lymphadenopathy is stable at 1.1 cm there are no other areas of disease.\par \par -Patient with progression on Xeloda and Avastin, with worsening lung nodules on the scan in February 2021 switched to  oxaliplatin, Xeloda and Avastin\par -Cycle 2 of oxaliplatin was stopped after less than half of the infusion due to allergic reaction( 3/24/21) \par -then on irinotecan every 3 weeks ( xeloda dc due to toxicity ) \par -CT scan reviewed shows some slight progression in lung nodules but otherwise stable\par  was continued on the irinotecan and Avasti\par \par \par Gen path testing 10/19 : BOBBY. No mutations identified\par Foundation testing--7/1/2020----liquid bx -- somatic  BRCA2 splice site mutation and TP53 mutation. Her2 not amplified\par  [facility] testing does not indicate a BRCA germline, but did find 2 RODOLFO and one MSH6. (all are VUS) \par 6/17/22: Her2 FISH negative\par \par CT scan of the chest abdomen and pelvis done March 14, 2022 at [facility] shows some slight progression in lung nodules including the left upper lobe measuring 1 x 0.7 cm previously 0.8 x 0.6 left lower lobe measuring 1.1 x 0.8 previously 0.8 x 0.6 in the right lower lobe lung nodule 1.2 x 1 cm previously 1 x 0.9.Retroperitoneal adenopathy is stable there are no new areas of disease.\par Imaging consistent with slowly progressive disease, she was changed to [facility] in March 2022.  [de-identified] : Patient presents for routine follow up today for the management of metastatic CRC. \par She will be receiving Panitumumab and  Irinotecan.  She continues to have watery BM approximately 3 times daily.  She has had explosive diarrhea today because she held off on her oxycodone.  She consulted with palliative care who recommended oxycodone for pain and mirtazapine for appetite stimulation, however, they will consult with her psychiatrist first to make sure the Mirtazapine does not interfere with her current psychiatric meds.  She continues to have pain despite the oxycodone.  Her appetite is poor because of dysgeusia, although she receives medical marijuana for appetite. She will be having a telemedicine visit with palliative medicine next week.\par \par \par \par \par \par \par  \par

## 2023-06-23 NOTE — PHYSICAL EXAM
[Restricted in physically strenuous activity but ambulatory and able to carry out work of a light or sedentary nature] : Status 1- Restricted in physically strenuous activity but ambulatory and able to carry out work of a light or sedentary nature, e.g., light house work, office work [Obese] : obese [Normal] : grossly intact [de-identified] : colostomy with  hernia , non tender no masses  [de-identified] : acneiform rash over the cheeks [de-identified] : very anxious

## 2023-06-28 ENCOUNTER — APPOINTMENT (OUTPATIENT)
Dept: GERIATRICS | Facility: CLINIC | Age: 63
End: 2023-06-28
Payer: COMMERCIAL

## 2023-06-28 PROCEDURE — 99442: CPT

## 2023-06-28 NOTE — HISTORY OF PRESENT ILLNESS
[Home] : at home, [unfilled] , at the time of the visit. [Medical Office: ([facility])___] : at the medical office located in  [Spouse] : spouse [Verbal consent obtained from patient] : the patient, [unfilled] [FreeTextEntry1] : Orin Weinberg is a 61 y/o F w/ metastatic colon CA s/p resection/debulking '18 w/ end colostomy currently on Panitumumab and Irinotecan \par \par CT A/P/Chest 4/4/23 --> innumerable b/l pulmonary nodules, L RP mass, diffuse lytic/sclerotic lesions most pronounced in the L1 and L3 vertebral bodies\par Pending re-imaging in July\par \par 6/14 (initial eval): Biggest complaint is poor appetite. They try to get 2 high calorie drinks (Boost, etc) in per day but other than that- very little supplemental. Sleeps a lot during the day, not a lot at night- not tired. + Lower back pain, doesn't radiate. Takes Tylenol/Aleve 1-2 per day, takes the edge off. Is in pain constantly. + Diarrhea. "Ponce to keep her hydrated." Cannabis helps with nausea. \par RX: Start Oxy 5mg TID PRN, Oxy 5-10mg QHS EVELYN, no laxatives yet 2/2 diarrhea, consider Remeron to further increase appetite/sleep (reached out to Psychiatrist to discuss). Discussed ACPs, no decisions made. \par \par 6/28 (f/u, telephonic): Feels slightly better overall, but still reports that her biggest complaint is her back pain. Finds Oxycodone helpful. Takes it approx once/daily, only when she feels pain. Hesitant to take it more due to concern of side effects of narcotics. When she takes Oxycodone diarrhea stops; if she doesn't take it, it resumes. Appetite is the same.  Pending rad onc consult. \par \par [place] (): [phone]\par 2, 21 year old children who live at home and work locally (not twins)\par Dr. Powers (psychiatrist)

## 2023-06-28 NOTE — PHYSICAL EXAM
[Normal Oral Mucosa] : normal oral mucosa [Heart Sounds] : normal S1 and S2 [FreeTextEntry1] : Frail, alert, poor dentition [Oriented To Time, Place, And Person] : oriented to person, place, and time

## 2023-06-28 NOTE — ASSESSMENT
[FreeTextEntry1] : \par Cancer related pain\par - Start Bup patch @ 7.5mcg/hr weekly\par - C/w Oxy 5mg TID PRN\par - Oxy 5 or 10mg QHS EVELYN\par - Narcan PRN ordered 6/28\par \par OIC Prevention\par - No laxatives yet; s/p colostomy now with diarrhea 2/2 treatment\par - Advised to hold Imodium PRN for now\par \par Insomnia\par - Sleepy hygiene reviewed\par - Remeron would also help with this- pending callback from psychiatrist  --> family to request callback as well\par \par RTC in 2 weeks or PRN sooner for TEB for symptom check on pain\par

## 2023-07-01 LAB — CEA SERPL-MCNC: 26.2 NG/ML

## 2023-07-03 ENCOUNTER — NON-APPOINTMENT (OUTPATIENT)
Age: 63
End: 2023-07-03

## 2023-07-03 ENCOUNTER — APPOINTMENT (OUTPATIENT)
Dept: RADIATION ONCOLOGY | Facility: CLINIC | Age: 63
End: 2023-07-03
Payer: COMMERCIAL

## 2023-07-03 VITALS
RESPIRATION RATE: 16 BRPM | DIASTOLIC BLOOD PRESSURE: 78 MMHG | HEART RATE: 88 BPM | BODY MASS INDEX: 23.43 KG/M2 | OXYGEN SATURATION: 99 % | WEIGHT: 124 LBS | SYSTOLIC BLOOD PRESSURE: 113 MMHG

## 2023-07-03 PROCEDURE — 99205 OFFICE O/P NEW HI 60 MIN: CPT | Mod: 25

## 2023-07-05 NOTE — PHYSICAL EXAM
[Thin] : thin [Sclera] : the sclera and conjunctiva were normal [] : no respiratory distress [Abdomen Soft] : soft [Nondistended] : nondistended [Abdomen Tenderness] : non-tender [Normal] : no palpable adenopathy [Musculoskeletal - Swelling] : no joint swelling [Range of Motion to Joints] : range of motion to joints [Nail Clubbing] : no clubbing  or cyanosis of the fingernails [Skin Color & Pigmentation] : normal skin color and pigmentation [No Focal Deficits] : no focal deficits [Sensation] : the sensory exam was normal to light touch and pinprick [Motor Exam] : the motor exam was normal [Oriented To Time, Place, And Person] : oriented to person, place, and time [de-identified] : Appears older than stated age [de-identified] : +ostomy [de-identified] : Poor dentition, no thrush  [de-identified] : Tenderness to palpation in the mid back and also lower back

## 2023-07-05 NOTE — HISTORY OF PRESENT ILLNESS
[FreeTextEntry1] : Ms Weinberg is a 62 year old woman with stage 4 colon cancer, referred for consideration of palliative radiation to painful bony metastasis.\par \par The patient was diagnosed with synchronous rectal and sigmoid colon cancer in Oct 2018, with Dr. Parikh at [place]. She had neoadjuvant chemotherapy and then underwent surgery. Pathology revealed, complete response in rectum but residual moderately differentiated adenocarcinoma of sigmoid, omentum positive for adeno and right ovary positive for metastatic colon cancer. No perforation was noted but positive for perineural invasion. Rectum ypT1N0. Sigmoid ypT3N0 M1c \par \par She is s/p multiple chemotherapy regimens and will be starting Panitumumab and Irinotecan.   \par \par Her most recent CT scan at [facility] done in April 2023 in [place] showed: Diffuse Lytic and sclerotic lesions most pronounced un the L1 and L3 vertebral bodies.  Sclerotic component appears stable however there is increase lytic component both vertebral bodies concerning for possible progression of metastasis. Innumerable pulmonary nodules slightly increased in size from 1/16/23  concerning for progression.  \par \par Today 7/3/23 she is here to discuss Radiation therapy  to the lytic lesions in the Lumbar spine.  She is having back pain at a scale of 7 out of 10.  She takes oxycodone 5mg and can take more if needed but she is hesitant to do so.  Her appetite is fair and she still suffers from some nausea for which she takes Zofran.  She has some 'sciatic like" pain in her right leg as well .  She is currently in a wheelchair for generalized weakness but denies any focal neurological deficits.

## 2023-07-05 NOTE — VITALS
[Maximal Pain Intensity: 7/10] : 7/10 [Least Pain Intensity: 0/10] : 0/10 [Opioid] : opioid [Date: ____________] : Patient's last distress assessment performed on [unfilled]. [7 - Distress Level] : Distress Level: 7 [60: Requires occasional assistance, but is able to care for most of his/her needs] : 60: Requires occasional assistance, but is able to care for most of his/her needs

## 2023-07-05 NOTE — REVIEW OF SYSTEMS
[Fatigue] : fatigue [Recent Change In Weight] : ~T recent weight change [Negative] : Allergic/Immunologic [FreeTextEntry7] : nausea [FreeTextEntry9] : Back pain

## 2023-07-06 ENCOUNTER — RESULT REVIEW (OUTPATIENT)
Age: 63
End: 2023-07-06

## 2023-07-06 ENCOUNTER — APPOINTMENT (OUTPATIENT)
Dept: HEMATOLOGY ONCOLOGY | Facility: CLINIC | Age: 63
End: 2023-07-06
Payer: COMMERCIAL

## 2023-07-06 VITALS
TEMPERATURE: 98.4 F | BODY MASS INDEX: 23.41 KG/M2 | DIASTOLIC BLOOD PRESSURE: 68 MMHG | RESPIRATION RATE: 16 BRPM | OXYGEN SATURATION: 100 % | HEART RATE: 97 BPM | SYSTOLIC BLOOD PRESSURE: 105 MMHG | WEIGHT: 124 LBS | HEIGHT: 61 IN

## 2023-07-06 DIAGNOSIS — R19.7 DIARRHEA, UNSPECIFIED: ICD-10-CM

## 2023-07-06 PROCEDURE — 99213 OFFICE O/P EST LOW 20 MIN: CPT

## 2023-07-08 PROBLEM — R19.7 DIARRHEA: Status: ACTIVE | Noted: 2022-11-26

## 2023-07-08 NOTE — PHYSICAL EXAM
[Restricted in physically strenuous activity but ambulatory and able to carry out work of a light or sedentary nature] : Status 1- Restricted in physically strenuous activity but ambulatory and able to carry out work of a light or sedentary nature, e.g., light house work, office work [Obese] : obese [Normal] : grossly intact [de-identified] : colostomy with  hernia , non tender no masses  [de-identified] : acneiform rash over the cheeks [de-identified] : very anxious

## 2023-07-08 NOTE — ASSESSMENT
[FreeTextEntry1] : 62 year old woman with metastatic colorectal cancer, s/p hemicolectomy and debulking procedure. KRAS, NRAS, BRAF wildtype, MSI stable s/p FOLFOX (neoadjuvant)  \par \par She was switched to Xeloda/Avastin with noted POD and transitioned to oxaliplatin, Xeloda and Avastin (2/2021). Oxaliplatin was discontinued at C2 2/2 to allergic reaction (3/24/21) and switched to irinotecan. Xeloda was held due to toxicity\par \par Repeat CT scan of the chest abdomen pelvis in November 2021 shows some slight increase in size of the lung nodules but overall stability.  No evidence of any other metastatic disease.  Avastin has been on hold due to\par Dental procedures and colonoscopy.   CEA began to rise so Xeloda/Avastin was restarted in 12/2021. \par \par Gen path testing 10/19 : BOBBY. No mutations identified\par Foundation testing--7/1/2020----liquid bx -- somatic  BRCA2 splice site mutation and TP53 mutation. Her2 not amplified\par  [facility] testing does not indicate a BRCA germline, but did find 2 RODOLFO and one MSH6. (all are VUS) \par 6/17/22: Her2 FISH negative\par \par CT scan of the chest abdomen and pelvis done March 14, 2022 at [facility] shows some slight progression in lung nodules including the left upper lobe measuring 1 x 0.7 cm previously 0.8 x 0.6 left lower lobe measuring 1.1 x 0.8 previously 0.8 x 0.6 in the right lower lobe lung nodule 1.2 x 1 cm previously 1 x 0.9.Retroperitoneal adenopathy is stable there are no new areas of disease.Imaging consistent with slowly progressive disease, she was changed to [facility] in March 2022. \par \par 9/26/22 CT CAP imaging which revealed L para-aortic/L psoas mass is slightly increased in size measuring 4 x 2.5 cm. Additionally there is new bony destruction of the adjacent L3 vertebral body measuring 1.5 x 1.8 cm. There is a 1.3 x 1.5 cm subtle are of liver enhancement adjacent to the gallbladder. This is somewhat more pronounced when compared to prior study it is unclear if this represents a true underlying lesion versus perfusion anomaly. Pulmonary nodules are stable to slightly smaller compared to prior study. \par \par Panitumumab started 10/20/22\par \par 1/16/23 CT CAP revealing no change in metastatic disease within the CAP. \par 4/4/23--mild progression\par \par Plan:\par - Hold panitumumab irinotecan today for palliative radiation to sclerotic lesions in L spine. Mapping Tues. tx in one week or so.\par - Rescan with CT chest abdomen pelvis after July 5, ordered\par - Hydration with normal saline today.\par -  Acneiform rash  G1, improved. Continue Hydrocortisone 2/5% + clinda gel\par - Dental clearance, needs extractions and denosumab. Rediscussed\par -- wt. loss, continue f/up with nutritionist Tara Olson and palliative med for appetite stimulation, pain management.. Refer to allergy for oxaliplatin desensitization, in future\par -Discussed management of diarrhea patient is advised on use of loperamide and Lomotil\par -Labs ordered, drawn in the office and reviewed.\par - Continue routine, age-appropriate, healthcare maintenance \par History of present illness, review of systems, physical exam and treatment plan reviewed with Dr. Verito Martin\par -  Office visit in 2 weeks or prn for new or worsening symptoms. \par \par

## 2023-07-08 NOTE — HISTORY OF PRESENT ILLNESS
[0 - No Distress] : Distress Level: 0 [de-identified] : This is a 62 year old woman with a history of stage 4 colon cancer, KRAS wild type, MSI stable, rectal and sigmoid primary, found to have peritoneal disease at time of initial staging.  She is s/p FOLFIRINOX and Avastin originally, irinotecan dropped for toxicity. She is s/p FOLFOX avastin x6 months. Avastin held 2/2 HTN and she is now on xeloda alone.\par \par Patient underwent resection for the 2 primaries as well as debulking in Oct 2018, with Dr. Parikh at [place].  Pathology revealed, complete response in rectum but residual moderately differentiated adenocarcinoma of sigmoid, omentum positive for adeno and right ovary positive for metastatic colon cancer . No perforation was noted but pos for perineural invasion. \par Rectum ypT1No\par Sigmoid ypT3No M1c \par \par Patient has been YOLIS , colonoscopy may 2019 one polyp. \par \par 2/2020 CT Imaging negative for mets, + fatty liver feb 2020. \par \par Cea is 3.8 which is up in may \par \par 6/2020 CT Imaging showed slight increase in adenopathy 8 mm to 1.1 cm \par \par 7/2020 PETCT - no uptake in nodes in retroperitoneum \par \par Foundation testing shows BRCA 2 mutation and also p53 mutation \par \par [facility] - RODOLFO mutation x2(2 diff heterogenous mutations uncertain sig ) , MSH6 heterogenous (undetermined sig) \par \par 10/2020 Mammogram done at [facility] in [place]  was negative\par \par 11/2020 CT Imaging - stable RP nodes , new multiple nodules up to 4 mm in lungs bilaterally , compared to feb 2020 \par \par 2/2021 CT CAP - revealing for progression of pulmonary metastases with slight increase in size and number of previously noted lung nodules, right lower lobe 6 mm up from 3 mm left upper lobe 5 mm up from 3 mm left lower lobe 6 mm up from 3 mm, the previously described retroperitoneal lymphadenopathy is stable at 1.1 cm there are no other areas of disease.\par \par -Patient with progression on Xeloda and Avastin, with worsening lung nodules on the scan in February 2021 switched to  oxaliplatin, Xeloda and Avastin\par -Cycle 2 of oxaliplatin was stopped after less than half of the infusion due to allergic reaction( 3/24/21) \par -then on irinotecan every 3 weeks ( xeloda dc due to toxicity ) \par -CT scan reviewed shows some slight progression in lung nodules but otherwise stable\par  was continued on the irinotecan and Avasti\par \par \par Gen path testing 10/19 : BOBBY. No mutations identified\par Foundation testing--7/1/2020----liquid bx -- somatic  BRCA2 splice site mutation and TP53 mutation. Her2 not amplified\par  [facility] testing does not indicate a BRCA germline, but did find 2 RODOLFO and one MSH6. (all are VUS) \par 6/17/22: Her2 FISH negative\par \par CT scan of the chest abdomen and pelvis done March 14, 2022 at [facility] shows some slight progression in lung nodules including the left upper lobe measuring 1 x 0.7 cm previously 0.8 x 0.6 left lower lobe measuring 1.1 x 0.8 previously 0.8 x 0.6 in the right lower lobe lung nodule 1.2 x 1 cm previously 1 x 0.9.Retroperitoneal adenopathy is stable there are no new areas of disease.\par Imaging consistent with slowly progressive disease, she was changed to [facility] in March 2022.  [de-identified] : Patient presents for routine follow up today for the management of metastatic CRC. \par She is scheduled to undergo radiation to L1 and L3 sclerotic lesions in the vertebral bodies.  She is scheduled for mapping Tuesday and will receive radiation in approximately 1 week from then for 1 session.  She was advised by radiation oncology to withhold systemic treatment until after the completion of radiation therapy.\par She has been receiving Panitumumab and  Irinotecan the last dose approximately 2 weeks ago.  She continues to have watery BM approximately 3 times daily with some explosive diarrhea despite oxycodone for pain, loperamide and Lomotil.  She consulted with palliative care who recommended oxycodone for pain and mirtazapine for appetite stimulation, however, they consulted with her psychiatrist who felt that Mirtazapine would interfere with her current psychiatric meds.  She continues to have pain despite the oxycodone.  Her appetite is poor because of dysgeusia, although she receives medical marijuana for appetite.  Her weight is stable .\par \par \par \par \par \par \par  \par

## 2023-07-08 NOTE — REVIEW OF SYSTEMS
[Fatigue] : fatigue [Abdominal Pain] : abdominal pain [Constipation] : constipation [Diarrhea: Grade 1 - Increase of <4 stools per day over baseline; mild increase in ostomy output compared to baseline] : Diarrhea: Grade 1 - Increase of <4 stools per day over baseline; mild increase in ostomy output compared to baseline [Muscle Pain] : muscle pain [Skin Rash] : skin rash [Negative] : Allergic/Immunologic [FreeTextEntry2] : Fair appetite

## 2023-07-10 NOTE — PHYSICAL EXAM
[Heart Sounds] : normal S1 and S2 [Oriented To Time, Place, And Person] : oriented to person, place, and time

## 2023-07-12 ENCOUNTER — APPOINTMENT (OUTPATIENT)
Dept: GERIATRICS | Facility: CLINIC | Age: 63
End: 2023-07-12
Payer: COMMERCIAL

## 2023-07-12 DIAGNOSIS — R53.83 OTHER FATIGUE: ICD-10-CM

## 2023-07-12 PROCEDURE — 99447 NTRPROF PH1/NTRNET/EHR 11-20: CPT

## 2023-07-12 NOTE — HISTORY OF PRESENT ILLNESS
[Home] : at home, [unfilled] , at the time of the visit. [Medical Office: ([facility])___] : at the medical office located in  [Spouse] : spouse [Verbal consent obtained from patient] : the patient, [unfilled] [FreeTextEntry1] : Orin Weinberg is a 61 y/o F w/ metastatic colon CA s/p resection/debulking '18 w/ end colostomy currently on Panitumumab and Irinotecan \par \par CT A/P/Chest 4/4/23 --> innumerable b/l pulmonary nodules, L RP mass, diffuse lytic/sclerotic lesions most pronounced in the L1 and L3 vertebral bodies\par Pending re-imaging in July\par \par 6/14 (initial eval): Biggest complaint is poor appetite. They try to get 2 high calorie drinks (Boost, etc) in per day but other than that- very little supplemental. Sleeps a lot during the day, not a lot at night- not tired. + Lower back pain, doesn't radiate. Takes Tylenol/Aleve 1-2 per day, takes the edge off. Is in pain constantly. + Diarrhea. "Ponce to keep her hydrated." Cannabis helps with nausea. \par RX: Start Oxy 5mg TID PRN, Oxy 5-10mg QHS EVELYN, no laxatives yet 2/2 diarrhea, consider Remeron to further increase appetite/sleep (reached out to Psychiatrist to discuss). Discussed ACPs, no decisions made. \par \par 6/28 (f/u, telephonic): Feels slightly better overall, but still reports that her biggest complaint is her back pain. Finds Oxycodone helpful. Takes it approx once/daily, only when she feels pain. Hesitant to take it more due to concern of side effects of narcotics. When she takes Oxycodone diarrhea stops; if she doesn't take it, it resumes. Appetite is the same.  Pending rad onc consult. \par RX: Start Bup patch @ 7.5mcg/weekly, c/w Oxy PRN and QHS EVELYN\par \par 7/12 (f/u): Pending palliative XRT to lumbar spine. Did not yet start Bup patch- hasn't gotten in. Taking Oxy a few times a day and finds it less effective. Also vapes 1G of 90% THC every other day, which helps stimulate her appetite and also helps with pain, but she is still "not able to eat." Pain overall is worse. Pending XRT next week. \par \par [place] (): [phone]\par 2, 21 year old children who live at home and work locally (not twins)\par Dr. Powers (psychiatrist)

## 2023-07-12 NOTE — ASSESSMENT
[FreeTextEntry1] : \par Cancer related pain\par - Start Bup patch @ 7.5mcg/hr weekly --> sent to [facility]\par - Increase  Oxy to 10mg Q4H PRN\par - Narcan PRN ordered 6/28\par - to start XRT this week\par \par OIC Prevention\par - No laxatives yet; s/p colostomy now with diarrhea 2/2 treatment\par - Advised to hold Imodium PRN for now\par - Will take Senna if > 2 days w/o BM\par \par Insomnia\par - Sleep hygiene reviewed\par \par RTC in 4 weeks or PRN sooner for TEB for symptom check on pain\par

## 2023-07-20 ENCOUNTER — APPOINTMENT (OUTPATIENT)
Dept: HEMATOLOGY ONCOLOGY | Facility: CLINIC | Age: 63
End: 2023-07-20

## 2023-07-20 ENCOUNTER — NON-APPOINTMENT (OUTPATIENT)
Age: 63
End: 2023-07-20

## 2023-07-25 DIAGNOSIS — F41.9 ANXIETY DISORDER, UNSPECIFIED: ICD-10-CM

## 2023-08-03 ENCOUNTER — RESULT REVIEW (OUTPATIENT)
Age: 63
End: 2023-08-03

## 2023-08-03 ENCOUNTER — APPOINTMENT (OUTPATIENT)
Dept: HEMATOLOGY ONCOLOGY | Facility: CLINIC | Age: 63
End: 2023-08-03
Payer: COMMERCIAL

## 2023-08-03 VITALS
OXYGEN SATURATION: 100 % | TEMPERATURE: 99 F | HEART RATE: 120 BPM | DIASTOLIC BLOOD PRESSURE: 54 MMHG | SYSTOLIC BLOOD PRESSURE: 79 MMHG | HEIGHT: 61 IN | RESPIRATION RATE: 16 BRPM

## 2023-08-03 PROCEDURE — 36415 COLL VENOUS BLD VENIPUNCTURE: CPT

## 2023-08-03 PROCEDURE — 99213 OFFICE O/P EST LOW 20 MIN: CPT | Mod: 25

## 2023-08-06 RX ORDER — REGORAFENIB 40 MG/1
40 TABLET, FILM COATED ORAL
Qty: 84 | Refills: 2 | Status: ACTIVE | COMMUNITY
Start: 2023-08-06 | End: 1900-01-01

## 2023-08-07 NOTE — HISTORY OF PRESENT ILLNESS
[0 - No Distress] : Distress Level: 0 [de-identified] : This is a 62 year old woman with a history of stage 4 colon cancer, KRAS wild type, MSI stable, rectal and sigmoid primary, found to have peritoneal disease at time of initial staging.  She is s/p FOLFIRINOX and Avastin originally, irinotecan dropped for toxicity. She is s/p FOLFOX avastin x6 months. Avastin held 2/2 HTN and she is now on xeloda alone.\par  \par  Patient underwent resection for the 2 primaries as well as debulking in Oct 2018, with Dr. Parikh at [place].  Pathology revealed, complete response in rectum but residual moderately differentiated adenocarcinoma of sigmoid, omentum positive for adeno and right ovary positive for metastatic colon cancer . No perforation was noted but pos for perineural invasion. \par  Rectum ypT1No\par  Sigmoid ypT3No M1c \par  \par  Patient has been YOLIS , colonoscopy may 2019 one polyp. \par  \par  2/2020 CT Imaging negative for mets, + fatty liver feb 2020. \par  \par  Cea is 3.8 which is up in may \par  \par  6/2020 CT Imaging showed slight increase in adenopathy 8 mm to 1.1 cm \par  \par  7/2020 PETCT - no uptake in nodes in retroperitoneum \par  \par  Foundation testing shows BRCA 2 mutation and also p53 mutation \par  \par  Invitae - RODOLFO mutation x2(2 diff heterogenous mutations uncertain sig ) , MSH6 heterogenous (undetermined sig) \par  \par  10/2020 Mammogram done at [facility] in [place]  was negative\par  \par  11/2020 CT Imaging - stable RP nodes , new multiple nodules up to 4 mm in lungs bilaterally , compared to feb 2020 \par  \par  2/2021 CT CAP - revealing for progression of pulmonary metastases with slight increase in size and number of previously noted lung nodules, right lower lobe 6 mm up from 3 mm left upper lobe 5 mm up from 3 mm left lower lobe 6 mm up from 3 mm, the previously described retroperitoneal lymphadenopathy is stable at 1.1 cm there are no other areas of disease.\par  \par  -Patient with progression on Xeloda and Avastin, with worsening lung nodules on the scan in February 2021 switched to  oxaliplatin, Xeloda and Avastin\par  -Cycle 2 of oxaliplatin was stopped after less than half of the infusion due to allergic reaction( 3/24/21) \par  -then on irinotecan every 3 weeks ( xeloda dc due to toxicity ) \par  -CT scan reviewed shows some slight progression in lung nodules but otherwise stable\par   was continued on the irinotecan and Avasti\par  \par  \par  Gen path testing 10/19 : BOBBY. No mutations identified\par  Foundation testing--7/1/2020----liquid bx -- somatic  BRCA2 splice site mutation and TP53 mutation. Her2 not amplified\par   [facility] testing does not indicate a BRCA germline, but did find 2 RODOLFO and one MSH6. (all are VUS) \par  6/17/22: Her2 FISH negative\par  \par  CT scan of the chest abdomen and pelvis done March 14, 2022 at [facility] shows some slight progression in lung nodules including the left upper lobe measuring 1 x 0.7 cm previously 0.8 x 0.6 left lower lobe measuring 1.1 x 0.8 previously 0.8 x 0.6 in the right lower lobe lung nodule 1.2 x 1 cm previously 1 x 0.9.Retroperitoneal adenopathy is stable there are no new areas of disease.\par  Imaging consistent with slowly progressive disease, she was changed to [facility] in March 2022.  [de-identified] : Patient presents for routine follow up today for the management of metastatic CRC. \par  She is scheduled to undergo radiation to L1 and L3 sclerotic lesions in the vertebral bodies.  She is scheduled for mapping Tuesday and will receive radiation in approximately 1 week from then for 1 session.  She was advised by radiation oncology to withhold systemic treatment until after the completion of radiation therapy.\par  She has been receiving Panitumumab and  Irinotecan the last dose approximately 2 weeks ago.  She continues to have watery BM approximately 3 times daily with some explosive diarrhea despite oxycodone for pain, loperamide and Lomotil.  She consulted with palliative care who recommended oxycodone for pain and mirtazapine for appetite stimulation, however, they consulted with her psychiatrist who felt that Mirtazapine would interfere with her current psychiatric meds.  She continues to have pain despite the oxycodone.  Her appetite is poor because of dysgeusia, although she receives medical marijuana for appetite.  Her weight is stable .\par  \par  \par  \par  \par  \par  \par   \par

## 2023-08-07 NOTE — ASSESSMENT
[FreeTextEntry1] : 62 year old woman with metastatic colorectal cancer, s/p hemicolectomy and debulking procedure. KRAS, NRAS, BRAF wildtype, MSI stable s/p FOLFOX (neoadjuvant)  \par  \par  She was switched to Xeloda/Avastin with noted POD and transitioned to oxaliplatin, Xeloda and Avastin (2/2021). Oxaliplatin was discontinued at C2 2/2 to allergic reaction (3/24/21) and switched to irinotecan. Xeloda was held due to toxicity\par  \par  Repeat CT scan of the chest abdomen pelvis in November 2021 shows some slight increase in size of the lung nodules but overall stability.  No evidence of any other metastatic disease.  Avastin has been on hold due to\par  Dental procedures and colonoscopy.   CEA began to rise so Xeloda/Avastin was restarted in 12/2021. \par  \par  Gen path testing 10/19 : BOBBY. No mutations identified\par  Foundation testing--7/1/2020----liquid bx -- somatic  BRCA2 splice site mutation and TP53 mutation. Her2 not amplified\par   [facility] testing does not indicate a BRCA germline, but did find 2 RODOLFO and one MSH6. (all are VUS) \par  6/17/22: Her2 FISH negative\par  \par  CT scan of the chest abdomen and pelvis done March 14, 2022 at [facility] shows some slight progression in lung nodules including the left upper lobe measuring 1 x 0.7 cm previously 0.8 x 0.6 left lower lobe measuring 1.1 x 0.8 previously 0.8 x 0.6 in the right lower lobe lung nodule 1.2 x 1 cm previously 1 x 0.9.Retroperitoneal adenopathy is stable there are no new areas of disease.Imaging consistent with slowly progressive disease, she was changed to [facility] in March 2022. \par  \par  9/26/22 CT CAP imaging which revealed L para-aortic/L psoas mass is slightly increased in size measuring 4 x 2.5 cm. Additionally there is new bony destruction of the adjacent L3 vertebral body measuring 1.5 x 1.8 cm. There is a 1.3 x 1.5 cm subtle are of liver enhancement adjacent to the gallbladder. This is somewhat more pronounced when compared to prior study it is unclear if this represents a true underlying lesion versus perfusion anomaly. Pulmonary nodules are stable to slightly smaller compared to prior study. \par  \par  Panitumumab started 10/20/22\par  \par  1/16/23 CT CAP revealing no change in metastatic disease within the CAP. \par  4/4/23--mild progression\par  \par  Plan:\par  - Hold panitumumab irinotecan today for palliative radiation to sclerotic lesions in L spine. Mapping Tues. tx in one week or so.\par  - Rescan with CT chest abdomen pelvis after July 5, ordered\par  - Hydration with normal saline today.\par  -  Acneiform rash  G1, improved. Continue Hydrocortisone 2/5% + clinda gel\par  - Dental clearance, needs extractions and denosumab. Rediscussed\par  -- wt. loss, continue f/up with nutritionist Tara Olson and palliative med for appetite stimulation, pain management.. Refer to allergy for oxaliplatin desensitization, in future\par  -Discussed management of diarrhea patient is advised on use of loperamide and Lomotil\par  -Labs ordered, drawn in the office and reviewed.\par  - Continue routine, age-appropriate, healthcare maintenance \par  History of present illness, review of systems, physical exam and treatment plan reviewed with Dr. Verito Martin\par  -  Office visit in 2 weeks or prn for new or worsening symptoms. \par  \par

## 2023-08-07 NOTE — REVIEW OF SYSTEMS
[Diarrhea: Grade 1 - Increase of <4 stools per day over baseline; mild increase in ostomy output compared to baseline] : Diarrhea: Grade 1 - Increase of <4 stools per day over baseline; mild increase in ostomy output compared to baseline [Skin Rash] : skin rash [Fatigue] : fatigue [Abdominal Pain] : abdominal pain [Constipation] : constipation [Muscle Pain] : muscle pain [Negative] : Allergic/Immunologic [FreeTextEntry7] : abdominal cramps [FreeTextEntry9] : +lower back pain [FreeTextEntry2] : Fair appetite

## 2023-08-07 NOTE — HISTORY OF PRESENT ILLNESS
[0 - No Distress] : Distress Level: 0 [de-identified] : This is a 62 year old woman with a history of stage 4 colon cancer, KRAS wild type, MSI stable, rectal and sigmoid primary, found to have peritoneal disease at time of initial staging.  She is s/p FOLFIRINOX and Avastin originally, irinotecan dropped for toxicity. She is s/p FOLFOX avastin x6 months. Avastin held 2/2 HTN and she is now on xeloda alone.\par  \par  Patient underwent resection for the 2 primaries as well as debulking in Oct 2018, with Dr. Parikh at [facility].  Pathology revealed, complete response in rectum but residual moderately differentiated adenocarcinoma of sigmoid, omentum positive for adeno and right ovary positive for metastatic colon cancer . No perforation was noted but pos for perineural invasion. \par  Rectum ypT1No\par  Sigmoid ypT3No M1c \par  \par  Patient has been YOLIS , colonoscopy may 2019 one polyp. \par  \par  2/2020 CT Imaging negative for mets, + fatty liver feb 2020. \par  \par  Cea is 3.8 which is up in may \par  \par  6/2020 CT Imaging showed slight increase in adenopathy 8 mm to 1.1 cm \par  \par  7/2020 PETCT - no uptake in nodes in retroperitoneum \par  \par  Foundation testing shows BRCA 2 mutation and also p53 mutation \par  \par  Inv[facility]ae - RODOLFO mutation x2(2 diff heterogenous mutations uncertain sig ) , MSH6 heterogenous (undetermined sig) \par  \par  10/2020 Mammogram done at [facility] in [place]  was negative\par  \par  11/2020 CT Imaging - stable RP nodes , new multiple nodules up to 4 mm in lungs bilaterally , compared to feb 2020 \par  \par  2/2021 CT CAP - revealing for progression of pulmonary metastases with slight increase in size and number of previously noted lung nodules, right lower lobe 6 mm up from 3 mm left upper lobe 5 mm up from 3 mm left lower lobe 6 mm up from 3 mm, the previously described retroperitoneal lymphadenopathy is stable at 1.1 cm there are no other areas of disease.\par  \par  -Patient with progression on Xeloda and Avastin, with worsening lung nodules on the scan in February 2021 switched to  oxaliplatin, Xeloda and Avastin\par  -Cycle 2 of oxaliplatin was stopped after less than half of the infusion due to allergic reaction( 3/24/21) \par  -then on irinotecan every 3 weeks ( xeloda dc due to toxicity ) \par  -CT scan reviewed shows some slight progression in lung nodules but otherwise stable\par   was continued on the irinotecan and Avasti\par  \par  \par  Gen path testing 10/19 : BOBBY. No mutations identified\par  Foundation testing--7/1/2020----liquid bx -- somatic  BRCA2 splice site mutation and TP53 mutation. Her2 not amplified\par   [facility] testing does not indicate a BRCA germline, but did find 2 RODOLFO and one MSH6. (all are VUS) \par  6/17/22: Her2 FISH negative\par  \par  CT scan of the chest abdomen and pelvis done March 14, 2022 at [facility] shows some slight progression in lung nodules including the left upper lobe measuring 1 x 0.7 cm previously 0.8 x 0.6 left lower lobe measuring 1.1 x 0.8 previously 0.8 x 0.6 in the right lower lobe lung nodule 1.2 x 1 cm previously 1 x 0.9.Retroperitoneal adenopathy is stable there are no new areas of disease.\par  Imaging consistent with slowly progressive disease, she was changed to [facility] in March 2022.  [de-identified] : Patient presents for routine follow up today for the management of metastatic CRC. \par  She is scheduled to undergo radiation to L1 and L3 sclerotic lesions in the vertebral bodies.  She is scheduled for mapping Tuesday and will receive radiation in approximately 1 week from then for 1 session.  She was advised by radiation oncology to withhold systemic treatment until after the completion of radiation therapy.\par  She has been receiving Panitumumab and  Irinotecan the last dose approximately 2 weeks ago.  She continues to have watery BM approximately 3 times daily with some explosive diarrhea despite oxycodone for pain, loperamide and Lomotil.  She consulted with palliative care who recommended oxycodone for pain and mirtazapine for appetite stimulation, however, they consulted with her psychiatrist who felt that Mirtazapine would interfere with her current psychiatric meds.  She continues to have pain despite the oxycodone.  Her appetite is poor because of dysgeusia, although she receives medical marijuana for appetite.  Her weight is stable .\par  \par  \par  \par  \par  \par  \par   \par

## 2023-08-07 NOTE — PHYSICAL EXAM
[Restricted in physically strenuous activity but ambulatory and able to carry out work of a light or sedentary nature] : Status 1- Restricted in physically strenuous activity but ambulatory and able to carry out work of a light or sedentary nature, e.g., light house work, office work [Obese] : obese [Normal] : grossly intact [de-identified] : ulcer over border of right tongue [de-identified] : colostomy with  hernia , non tender no masses  [de-identified] : acneiform rash over the cheeks [de-identified] : very anxious

## 2023-08-07 NOTE — PHYSICAL EXAM
[Restricted in physically strenuous activity but ambulatory and able to carry out work of a light or sedentary nature] : Status 1- Restricted in physically strenuous activity but ambulatory and able to carry out work of a light or sedentary nature, e.g., light house work, office work [Obese] : obese [Normal] : grossly intact [de-identified] : ulcer over border of right tongue [de-identified] : colostomy with  hernia , non tender no masses  [de-identified] : acneiform rash over the cheeks [de-identified] : very anxious

## 2023-08-07 NOTE — ASSESSMENT
[FreeTextEntry1] : 62 year old woman with metastatic colorectal cancer, s/p hemicolectomy and debulking procedure. KRAS, NRAS, BRAF wildtype, MSI stable s/p FOLFOX (neoadjuvant)  \par  \par  She was switched to Xeloda/Avastin with noted POD and transitioned to oxaliplatin, Xeloda and Avastin (2/2021). Oxaliplatin was discontinued at C2 2/2 to allergic reaction (3/24/21) and switched to irinotecan. Xeloda was held due to toxicity\par  \par  Repeat CT scan of the chest abdomen pelvis in November 2021 shows some slight increase in size of the lung nodules but overall stability.  No evidence of any other metastatic disease.  Avastin has been on hold due to\par  Dental procedures and colonoscopy.   CEA began to rise so Xeloda/Avastin was restarted in 12/2021. \par  \par  Gen path testing 10/19 : BOBBY. No mutations identified\par  Foundation testing--7/1/2020----liquid bx -- somatic  BRCA2 splice site mutation and TP53 mutation. Her2 not amplified\par   [facility] testing does not indicate a BRCA germline, but did find 2 RODOLFO and one MSH6. (all are VUS) \par  6/17/22: Her2 FISH negative\par  \par  CT scan of the chest abdomen and pelvis done March 14, 2022 at [facility] shows some slight progression in lung nodules including the left upper lobe measuring 1 x 0.7 cm previously 0.8 x 0.6 left lower lobe measuring 1.1 x 0.8 previously 0.8 x 0.6 in the right lower lobe lung nodule 1.2 x 1 cm previously 1 x 0.9.Retroperitoneal adenopathy is stable there are no new areas of disease.Imaging consistent with slowly progressive disease, she was changed to [facility] in March 2022. \par  \par  9/26/22 CT CAP imaging which revealed L para-aortic/L psoas mass is slightly increased in size measuring 4 x 2.5 cm. Additionally there is new bony destruction of the adjacent L3 vertebral body measuring 1.5 x 1.8 cm. There is a 1.3 x 1.5 cm subtle are of liver enhancement adjacent to the gallbladder. This is somewhat more pronounced when compared to prior study it is unclear if this represents a true underlying lesion versus perfusion anomaly. Pulmonary nodules are stable to slightly smaller compared to prior study. \par  \par  Panitumumab started 10/20/22\par  \par  1/16/23 CT CAP revealing no change in metastatic disease within the CAP. \par  4/4/23--mild progression\par  \par  Plan:\par  - Hold panitumumab irinotecan today for palliative radiation to sclerotic lesions in L spine. Mapping Tues. tx in one week or so.\par  - Rescan with CT chest abdomen pelvis after July 5, ordered\par  - Hydration with normal saline today.\par  -  Acneiform rash  G1, improved. Continue Hydrocortisone 2/5% + clinda gel\par  - Dental clearance, needs extractions and denosumab. Rediscussed\par  -- wt. loss, continue f/up with nutritionist Tara Olson and palliative med for appetite stimulation, pain management.. Refer to allergy for oxaliplatin desensitization, in future\par  -Discussed management of diarrhea patient is advised on use of loperamide and Lomotil\par  -Labs ordered, drawn in the office and reviewed.\par  - Continue routine, age-appropriate, healthcare maintenance \par  History of present illness, review of systems, physical exam and treatment plan reviewed with Dr. Verito Martin\par  -  Office visit in 2 weeks or prn for new or worsening symptoms. \par  \par

## 2023-08-11 ENCOUNTER — APPOINTMENT (OUTPATIENT)
Dept: GERIATRICS | Facility: CLINIC | Age: 63
End: 2023-08-11

## 2023-08-11 ENCOUNTER — APPOINTMENT (OUTPATIENT)
Dept: GERIATRICS | Facility: CLINIC | Age: 63
End: 2023-08-11
Payer: COMMERCIAL

## 2023-08-11 PROCEDURE — 99442: CPT

## 2023-08-11 NOTE — HISTORY OF PRESENT ILLNESS
[Home] : at home, [unfilled] , at the time of the visit. [Medical Office: ([facility])___] : at the medical office located in  [Spouse] : spouse [Verbal consent obtained from patient] : the patient, [unfilled] [FreeTextEntry1] : Orin Weinberg is a 63 y/o F w/ metastatic colon CA s/p resection/debulking '18 w/ end colostomy currently s/p panitumumab and Irinotecan, pending intiiation of n Stivarga  CT A/P/Chest 4/4/23 --> innumerable b/l pulmonary nodules, L RP mass, diffuse lytic/sclerotic lesions most pronounced in the L1 and L3 vertebral bodies Pending re-imaging in July 6/14 (initial eval): Biggest complaint is poor appetite. They try to get 2 high calorie drinks (Boost, etc) in per day but other than that- very little supplemental. Sleeps a lot during the day, not a lot at night- not tired. + Lower back pain, doesn't radiate. Takes Tylenol/Aleve 1-2 per day, takes the edge off. Is in pain constantly. + Diarrhea. "Ponce to keep her hydrated." Cannabis helps with nausea.  RX: Start Oxy 5mg TID PRN, Oxy 5-10mg QHS EVELYN, no laxatives yet 2/2 diarrhea, consider Remeron to further increase appetite/sleep (reached out to Psychiatrist to discuss). Discussed ACPs, no decisions made.   6/28 (f/u, telephonic): Feels slightly better overall, but still reports that her biggest complaint is her back pain. Finds Oxycodone helpful. Takes it approx once/daily, only when she feels pain. Hesitant to take it more due to concern of side effects of narcotics. When she takes Oxycodone diarrhea stops; if she doesn't take it, it resumes. Appetite is the same.  Pending rad onc consult.  RX: Start Bup patch @ 7.5mcg/weekly, c/w Oxy PRN and QHS EVELYN  7/12 (f/u): Pending palliative XRT to lumbar spine. Did not yet start Bup patch- hasn't gotten in. Taking Oxy a few times a day and finds it less effective. Also vapes 1G of 90% THC every other day, which helps stimulate her appetite and also helps with pain, but she is still "not able to eat." Pain overall is worse. Pending XRT next week.  RX: Start Bup patch @ 5mcg/weekly, increase Oxy to 10mg PRN  8/11 (f/u): Had palliative XRT to lumbar spine on 7/28. Felt a little relief in the beginning, now does not have any. Still is in intractable pain. Has the patch on but does not find it helpful. Nilton feels she takes less Oxycodone with it on. Takes roughly 4 Oxy 10mg/day and finds it helpful. Pending initiation of Stivarga next week, was intolerant of irino/panit. Does not want any interventional pain management at the moment due to fear of needles.   Nilton (): [phone] 2, 21 year old children who live at home and work locally (not twins) Dr. Powers (psychiatrist)

## 2023-08-11 NOTE — ASSESSMENT
[FreeTextEntry1] : Cancer related pain - Increase Bup patch to 7.5mcg/hr weekly. Was previously ordered so patient has so. Will start today. If no change by Monday, remove old 7.5mcg patch and place on 2 new ones (totally 15mcg) and call to notify me of the change - Increase  Oxy to 10mg Q4H PRN - Narcan PRN ordered 6/28  RTC in 6 weeks or PRN sooner for in person visit

## 2023-08-17 ENCOUNTER — RESULT REVIEW (OUTPATIENT)
Age: 63
End: 2023-08-17

## 2023-08-17 ENCOUNTER — APPOINTMENT (OUTPATIENT)
Dept: HEMATOLOGY ONCOLOGY | Facility: CLINIC | Age: 63
End: 2023-08-17
Payer: COMMERCIAL

## 2023-08-17 VITALS
WEIGHT: 111 LBS | TEMPERATURE: 98.9 F | BODY MASS INDEX: 20.96 KG/M2 | HEIGHT: 61 IN | RESPIRATION RATE: 16 BRPM | OXYGEN SATURATION: 99 % | HEART RATE: 109 BPM | DIASTOLIC BLOOD PRESSURE: 67 MMHG | SYSTOLIC BLOOD PRESSURE: 102 MMHG

## 2023-08-17 PROCEDURE — 99212 OFFICE O/P EST SF 10 MIN: CPT | Mod: 25

## 2023-08-17 PROCEDURE — 36415 COLL VENOUS BLD VENIPUNCTURE: CPT

## 2023-08-17 RX ORDER — TRIAMCINOLONE ACETONIDE 1 MG/G
0.1 OINTMENT TOPICAL TWICE DAILY
Qty: 1 | Refills: 2 | Status: DISCONTINUED | COMMUNITY
Start: 2022-12-15 | End: 2023-08-17

## 2023-08-17 RX ORDER — PROCHLORPERAZINE MALEATE 5 MG/1
5 TABLET ORAL
Refills: 0 | Status: DISCONTINUED | COMMUNITY
Start: 2023-06-14 | End: 2023-08-17

## 2023-08-17 RX ORDER — ALPRAZOLAM 2 MG/1
2 TABLET ORAL
Qty: 14 | Refills: 0 | Status: DISCONTINUED | COMMUNITY
Start: 2023-07-25 | End: 2023-08-17

## 2023-08-21 RX ORDER — NALOXONE HYDROCHLORIDE 4 MG/.1ML
4 SPRAY NASAL
Qty: 1 | Refills: 0 | Status: DISCONTINUED | COMMUNITY
Start: 2023-06-14 | End: 2023-08-21

## 2023-08-21 RX ORDER — LIDOCAINE AND PRILOCAINE 25; 25 MG/G; MG/G
2.5-2.5 CREAM TOPICAL
Qty: 1 | Refills: 5 | Status: DISCONTINUED | COMMUNITY
Start: 2021-08-18 | End: 2023-08-21

## 2023-08-21 NOTE — PHYSICAL EXAM
[Restricted in physically strenuous activity but ambulatory and able to carry out work of a light or sedentary nature] : Status 1- Restricted in physically strenuous activity but ambulatory and able to carry out work of a light or sedentary nature, e.g., light house work, office work [Obese] : obese [Normal] : grossly intact [de-identified] : ulcer over border of right tongue [de-identified] : colostomy with  hernia , non tender no masses  [de-identified] : acneiform rash over the cheeks [de-identified] : very anxious

## 2023-08-21 NOTE — HISTORY OF PRESENT ILLNESS
[0 - No Distress] : Distress Level: 0 [de-identified] : This is a 62 year old woman with a history of stage 4 colon cancer, KRAS wild type, MSI stable, rectal and sigmoid primary, found to have peritoneal disease at time of initial staging.  She is s/p FOLFIRINOX and Avastin originally, irinotecan dropped for toxicity. She is s/p FOLFOX avastin x6 months. Avastin held 2/2 HTN and she is now on xeloda alone.  Patient underwent resection for the 2 primaries as well as debulking in Oct 2018, with Dr. Parikh at [place].  Pathology revealed, complete response in rectum but residual moderately differentiated adenocarcinoma of sigmoid, omentum positive for adeno and right ovary positive for metastatic colon cancer . No perforation was noted but pos for perineural invasion.  Rectum ypT1No Sigmoid ypT3No M1c   Patient has been YOLIS , colonoscopy may 2019 one polyp.   2/2020 CT Imaging negative for mets, + fatty liver feb 2020.   Cea is 3.8 which is up in may   6/2020 CT Imaging showed slight increase in adenopathy 8 mm to 1.1 cm   7/2020 PETCT - no uptake in nodes in retroperitoneum   Foundation testing shows BRCA 2 mutation and also p53 mutation   [facility]ae - RODOLFO mutation x2(2 diff heterogenous mutations uncertain sig ) , MSH6 heterogenous (undetermined sig)   10/2020 Mammogram done at [facility] in [place]  was negative  11/2020 CT Imaging - stable RP nodes , new multiple nodules up to 4 mm in lungs bilaterally , compared to feb 2020 2/2021 CT CAP - revealing for progression of pulmonary metastases with slight increase in size and number of previously noted lung nodules, right lower lobe 6 mm up from 3 mm left upper lobe 5 mm up from 3 mm left lower lobe 6 mm up from 3 mm, the previously described retroperitoneal lymphadenopathy is stable at 1.1 cm there are no other areas of disease.  -Patient with progression on Xeloda and Avastin, with worsening lung nodules on the scan in February 2021 switched to  oxaliplatin, Xeloda and Avastin -Cycle 2 of oxaliplatin was stopped after less than half of the infusion due to allergic reaction( 3/24/21)  -then on irinotecan every 3 weeks ( xeloda dc due to toxicity )  -CT scan reviewed shows some slight progression in lung nodules but otherwise stable  was continued on the irinotecan and Avasti   Gen path testing 10/19 : BOBBY. No mutations identified Foundation testing--7/1/2020----liquid bx -- somatic  BRCA2 splice site mutation and TP53 mutation. Her2 not amplified  [employer] testing does not indicate a BRCA germline, but did find 2 RODOLFO and one MSH6. (all are VUS)  6/17/22: Her2 FISH negative  CT scan of the chest abdomen and pelvis done March 14, 2022 at [facility] shows some slight progression in lung nodules including the left upper lobe measuring 1 x 0.7 cm previously 0.8 x 0.6 left lower lobe measuring 1.1 x 0.8 previously 0.8 x 0.6 in the right lower lobe lung nodule 1.2 x 1 cm previously 1 x 0.9.Retroperitoneal adenopathy is stable there are no new areas of disease. Imaging consistent with slowly progressive disease, she was changed to [facility] in March 2022.  [de-identified] : Had RT to sacral spine  on 7/20/23 --1 fraction  Discussed side effect profile of regorafenib including thromboembolic event, gi perforation, HTN, bleeding , proteinuria etc. PAtient to monitor BPs at home

## 2023-08-21 NOTE — ASSESSMENT
[FreeTextEntry1] : 62 year old woman with metastatic colorectal cancer, s/p hemicolectomy and debulking procedure. KRAS, NRAS, BRAF wildtype, MSI stable s/p FOLFOX (neoadjuvant)    She was switched to Xeloda/Avastin with noted POD and transitioned to oxaliplatin, Xeloda and Avastin (2/2021). Oxaliplatin was discontinued at C2 2/2 to allergic reaction (3/24/21) and switched to irinotecan. Xeloda was held due to toxicity  Repeat CT scan of the chest abdomen pelvis in November 2021 shows some slight increase in size of the lung nodules but overall stability.  No evidence of any other metastatic disease.  Avastin has been on hold due to Dental procedures and colonoscopy.   CEA began to rise so Xeloda/Avastin was restarted in 12/2021.   Gen path testing 10/19 : BOBBY. No mutations identified Foundation testing--7/1/2020----liquid bx -- somatic  BRCA2 splice site mutation and TP53 mutation. Her2 not amplified  [employer] testing does not indicate a BRCA germline, but did find 2 RODOLFO and one MSH6. (all are VUS)  6/17/22: Her2 FISH negative  CT scan of the chest abdomen and pelvis done March 14, 2022 at [facility] shows some slight progression in lung nodules including the left upper lobe measuring 1 x 0.7 cm previously 0.8 x 0.6 left lower lobe measuring 1.1 x 0.8 previously 0.8 x 0.6 in the right lower lobe lung nodule 1.2 x 1 cm previously 1 x 0.9.Retroperitoneal adenopathy is stable there are no new areas of disease.Imaging consistent with slowly progressive disease, she was changed to [facility] in March 2022.   9/26/22 CT CAP imaging which revealed L para-aortic/L psoas mass is slightly increased in size measuring 4 x 2.5 cm. Additionally there is new bony destruction of the adjacent L3 vertebral body measuring 1.5 x 1.8 cm. There is a 1.3 x 1.5 cm subtle are of liver enhancement adjacent to the gallbladder. This is somewhat more pronounced when compared to prior study it is unclear if this represents a true underlying lesion versus perfusion anomaly. Pulmonary nodules are stable to slightly smaller compared to prior study.   Panitumumab started 10/20/22 1/16/23 CT CAP revealing no change in metastatic disease within the CAP.  4/4/23--mild progression  -- added irinotecan to panitumumab s/p RT --single fraction to sacral spine on 7/20/23  Plan:- d/c irinotecan/panitumumab start regorafenib 160mg daily 21/ 28 days --8/18/23 IV hydration  RTC  2 weeks

## 2023-08-30 DIAGNOSIS — I10 ESSENTIAL (PRIMARY) HYPERTENSION: ICD-10-CM

## 2023-08-30 DIAGNOSIS — E55.9 VITAMIN D DEFICIENCY, UNSPECIFIED: ICD-10-CM

## 2023-08-30 DIAGNOSIS — F30.9 MANIC EPISODE, UNSPECIFIED: ICD-10-CM

## 2023-08-31 ENCOUNTER — RESULT REVIEW (OUTPATIENT)
Age: 63
End: 2023-08-31

## 2023-08-31 ENCOUNTER — APPOINTMENT (OUTPATIENT)
Dept: HEMATOLOGY ONCOLOGY | Facility: CLINIC | Age: 63
End: 2023-08-31
Payer: COMMERCIAL

## 2023-08-31 VITALS
HEART RATE: 128 BPM | RESPIRATION RATE: 16 BRPM | TEMPERATURE: 98.2 F | SYSTOLIC BLOOD PRESSURE: 95 MMHG | WEIGHT: 105 LBS | OXYGEN SATURATION: 100 % | HEIGHT: 61 IN | BODY MASS INDEX: 19.83 KG/M2 | DIASTOLIC BLOOD PRESSURE: 68 MMHG

## 2023-08-31 DIAGNOSIS — R63.4 ABNORMAL WEIGHT LOSS: ICD-10-CM

## 2023-08-31 LAB — CEA SERPL-MCNC: 172 NG/ML

## 2023-08-31 PROCEDURE — 99213 OFFICE O/P EST LOW 20 MIN: CPT

## 2023-09-01 RX ORDER — BUPRENORPHINE 5 UG/H
5 PATCH, EXTENDED RELEASE TRANSDERMAL
Qty: 4 | Refills: 0 | Status: DISCONTINUED | COMMUNITY
Start: 2023-06-28 | End: 2023-09-01

## 2023-09-07 PROBLEM — R63.4 WEIGHT LOSS: Status: ACTIVE | Noted: 2023-02-26

## 2023-09-07 NOTE — ASSESSMENT
[FreeTextEntry1] : 62 year old woman with metastatic colorectal cancer, s/p hemicolectomy and debulking procedure. KRAS, NRAS, BRAF wildtype, MSI stable s/p FOLFOX (neoadjuvant)    She was switched to Xeloda/Avastin with noted POD and transitioned to oxaliplatin, Xeloda and Avastin (2/2021). Oxaliplatin was discontinued at C2 2/2 to allergic reaction (3/24/21) and switched to irinotecan. Xeloda was held due to toxicity  Repeat CT scan of the chest abdomen pelvis in November 2021 shows some slight increase in size of the lung nodules but overall stability.  No evidence of any other metastatic disease.  Avastin has been on hold due to Dental procedures and colonoscopy.   CEA began to rise so Xeloda/Avastin was restarted in 12/2021.   Gen path testing 10/19 : BOBBY. No mutations identified Foundation testing--7/1/2020----liquid bx -- somatic  BRCA2 splice site mutation and TP53 mutation. Her2 not amplified  [employer] testing does not indicate a BRCA germline, but did find 2 RODOLFO and one MSH6. (all are VUS)  6/17/22: Her2 FISH negative  CT scan of the chest abdomen and pelvis done March 14, 2022 at [facility] shows some slight progression in lung nodules including the left upper lobe measuring 1 x 0.7 cm previously 0.8 x 0.6 left lower lobe measuring 1.1 x 0.8 previously 0.8 x 0.6 in the right lower lobe lung nodule 1.2 x 1 cm previously 1 x 0.9.Retroperitoneal adenopathy is stable there are no new areas of disease.Imaging consistent with slowly progressive disease, she was changed to [facility] in March 2022.   9/26/22 CT CAP imaging which revealed L para-aortic/L psoas mass is slightly increased in size measuring 4 x 2.5 cm. Additionally there is new bony destruction of the adjacent L3 vertebral body measuring 1.5 x 1.8 cm. There is a 1.3 x 1.5 cm subtle are of liver enhancement adjacent to the gallbladder. This is somewhat more pronounced when compared to prior study it is unclear if this represents a true underlying lesion versus perfusion anomaly. Pulmonary nodules are stable to slightly smaller compared to prior study.   Panitumumab started 10/20/22 1/16/23 CT CAP revealing no change in metastatic disease within the CAP.  4/4/23--mild progression  -- added irinotecan to panitumumab s/p RT --single fraction to sacral spine on 7/20/23  Plan:- d/c irinotecan/panitumumab Start regorafenib 160mg daily on 8/27/23 21/ 28 days IV hydration poor performance status- discussed terminal status of disease and advanced directives with the pt's .  Pt would like to continue therapy for now.  Continue follow-up with palliative care for symptom management. Labs ordered, drawn in the office, reviewed and are adequate for treatment  Patient to receive normal saline today. Continue routine, age-appropriate, healthcare maintenance  History of present illness, review of systems, physical exam and treatment plan reviewed with Dr. Verito Martin Office visit in 2 weeks or prn for new or worsening symptoms.    [With Patient/Caregiver] : With Patient/Caregiver [AdvancecareDate] : 8/31/23

## 2023-09-07 NOTE — PHYSICAL EXAM
[Restricted in physically strenuous activity but ambulatory and able to carry out work of a light or sedentary nature] : Status 1- Restricted in physically strenuous activity but ambulatory and able to carry out work of a light or sedentary nature, e.g., light house work, office work [Obese] : obese [Normal] : grossly intact [Capable of only limited self care, confined to bed or chair more than 50% of waking hours] : Status 3- Capable of only limited self care, confined to bed or chair more than 50% of waking hours [de-identified] : ulcer over border of right tongue [de-identified] : colostomy with  hernia , non tender no masses  [de-identified] : acneiform rash over the cheeks [de-identified] : very anxious

## 2023-09-07 NOTE — REVIEW OF SYSTEMS
[Fatigue] : fatigue [Abdominal Pain] : abdominal pain [Constipation] : constipation [Muscle Pain] : muscle pain [Negative] : Allergic/Immunologic [FreeTextEntry2] : 6 pounds since last week [FreeTextEntry7] : abdominal cramps [FreeTextEntry9] : +lower back pain

## 2023-09-07 NOTE — HISTORY OF PRESENT ILLNESS
[0 - No Distress] : Distress Level: 0 [de-identified] : This is a 63 year old woman with a history of stage 4 colon cancer, KRAS wild type, MSI stable, rectal and sigmoid primary, found to have peritoneal disease at time of initial staging.  She is s/p FOLFIRINOX and Avastin originally, irinotecan dropped for toxicity. She is s/p FOLFOX avastin x6 months. Avastin held 2/2 HTN and she is now on xeloda alone.  Patient underwent resection for the 2 primaries as well as debulking in Oct 2018, with Dr. Parikh at [place].  Pathology revealed, complete response in rectum but residual moderately differentiated adenocarcinoma of sigmoid, omentum positive for adeno and right ovary positive for metastatic colon cancer . No perforation was noted but pos for perineural invasion.  Rectum ypT1No Sigmoid ypT3No M1c   Patient has been YOLIS , colonoscopy may 2019 one polyp.   2/2020 CT Imaging negative for mets, + fatty liver feb 2020.   Cea is 3.8 which is up in may   6/2020 CT Imaging showed slight increase in adenopathy 8 mm to 1.1 cm   7/2020 PETCT - no uptake in nodes in retroperitoneum   Foundation testing shows BRCA 2 mutation and also p53 mutation   [employer] - RODOLFO mutation x2(2 diff heterogenous mutations uncertain sig ) , MSH6 heterogenous (undetermined sig)   10/2020 Mammogram done at [facility] in [place]  was negative  11/2020 CT Imaging - stable RP nodes , new multiple nodules up to 4 mm in lungs bilaterally , compared to feb 2020 2/2021 CT CAP - revealing for progression of pulmonary metastases with slight increase in size and number of previously noted lung nodules, right lower lobe 6 mm up from 3 mm left upper lobe 5 mm up from 3 mm left lower lobe 6 mm up from 3 mm, the previously described retroperitoneal lymphadenopathy is stable at 1.1 cm there are no other areas of disease.  -Patient with progression on Xeloda and Avastin, with worsening lung nodules on the scan in February 2021 switched to  oxaliplatin, Xeloda and Avastin -Cycle 2 of oxaliplatin was stopped after less than half of the infusion due to allergic reaction( 3/24/21)  -then on irinotecan every 3 weeks ( xeloda dc due to toxicity )  -CT scan reviewed shows some slight progression in lung nodules but otherwise stable  was continued on the irinotecan and Avasti   Gen path testing 10/19 : BOBBY. No mutations identified Foundation testing--7/1/2020----liquid bx -- somatic  BRCA2 splice site mutation and TP53 mutation. Her2 not amplified  [employer] testing does not indicate a BRCA germline, but did find 2 RODOLFO and one MSH6. (all are VUS)  6/17/22: Her2 FISH negative  CT scan of the chest abdomen and pelvis done March 14, 2022 at [facility] shows some slight progression in lung nodules including the left upper lobe measuring 1 x 0.7 cm previously 0.8 x 0.6 left lower lobe measuring 1.1 x 0.8 previously 0.8 x 0.6 in the right lower lobe lung nodule 1.2 x 1 cm previously 1 x 0.9.Retroperitoneal adenopathy is stable there are no new areas of disease. Imaging consistent with slowly progressive disease, she was changed to [facility] in March 2022.  [de-identified] : The patient presents for follow-up of metastatic rectal cancer on Stivarga started on 8/27/23, day 5 today.  She continues to experience pain despite prior radiation.  Palliative care practitioner has increased her oxycodone to 10 mg every 4 hours.  She is not eating food however she takes 3 ensures daily She has lost 6 pounds since last week  .Had RT to sacral spine  on 7/20/23 --1 fraction  Discussed side effect profile of regorafenib including thromboembolic event, gi perforation, HTN, bleeding , proteinuria etc. Patient to monitor BPs at home

## 2023-09-13 DIAGNOSIS — D72.819 DECREASED WHITE BLOOD CELL COUNT, UNSPECIFIED: ICD-10-CM

## 2023-09-13 DIAGNOSIS — E66.9 OBESITY, UNSPECIFIED: ICD-10-CM

## 2023-09-13 DIAGNOSIS — D64.9 ANEMIA, UNSPECIFIED: ICD-10-CM

## 2023-09-13 DIAGNOSIS — R91.8 OTHER NONSPECIFIC ABNORMAL FINDING OF LUNG FIELD: ICD-10-CM

## 2023-09-13 DIAGNOSIS — E87.1 HYPO-OSMOLALITY AND HYPONATREMIA: ICD-10-CM

## 2023-09-13 DIAGNOSIS — E83.42 HYPOMAGNESEMIA: ICD-10-CM

## 2023-09-13 DIAGNOSIS — E87.6 HYPOKALEMIA: ICD-10-CM

## 2023-09-13 DIAGNOSIS — D70.9 NEUTROPENIA, UNSPECIFIED: ICD-10-CM

## 2023-09-13 DIAGNOSIS — T45.1X5A AGRANULOCYTOSIS SECONDARY TO CANCER CHEMOTHERAPY: ICD-10-CM

## 2023-09-13 DIAGNOSIS — E53.8 DEFICIENCY OF OTHER SPECIFIED B GROUP VITAMINS: ICD-10-CM

## 2023-09-13 DIAGNOSIS — E86.0 DEHYDRATION: ICD-10-CM

## 2023-09-13 DIAGNOSIS — D69.6 THROMBOCYTOPENIA, UNSPECIFIED: ICD-10-CM

## 2023-09-13 DIAGNOSIS — E61.1 IRON DEFICIENCY: ICD-10-CM

## 2023-09-13 DIAGNOSIS — D70.1 AGRANULOCYTOSIS SECONDARY TO CANCER CHEMOTHERAPY: ICD-10-CM

## 2023-09-14 ENCOUNTER — APPOINTMENT (OUTPATIENT)
Dept: HEMATOLOGY ONCOLOGY | Facility: CLINIC | Age: 63
End: 2023-09-14

## 2023-09-22 ENCOUNTER — APPOINTMENT (OUTPATIENT)
Dept: GERIATRICS | Facility: CLINIC | Age: 63
End: 2023-09-22
Payer: COMMERCIAL

## 2023-09-22 DIAGNOSIS — C19 MALIGNANT NEOPLASM OF RECTOSIGMOID JUNCTION: ICD-10-CM

## 2023-09-22 DIAGNOSIS — R63.0 ANOREXIA: ICD-10-CM

## 2023-09-22 DIAGNOSIS — Z51.5 ENCOUNTER FOR PALLIATIVE CARE: ICD-10-CM

## 2023-09-22 DIAGNOSIS — C20 MALIGNANT NEOPLASM OF RECTUM: ICD-10-CM

## 2023-09-22 DIAGNOSIS — C79.51 MALIGNANT NEOPLASM OF RECTUM: ICD-10-CM

## 2023-09-22 DIAGNOSIS — G89.3 NEOPLASM RELATED PAIN (ACUTE) (CHRONIC): ICD-10-CM

## 2023-09-22 PROCEDURE — 99214 OFFICE O/P EST MOD 30 MIN: CPT | Mod: 95

## 2023-09-22 RX ORDER — MORPHINE SULFATE 15 MG/1
15 TABLET, FILM COATED, EXTENDED RELEASE ORAL TWICE DAILY
Qty: 20 | Refills: 0 | Status: ACTIVE | COMMUNITY
Start: 2023-09-22 | End: 1900-01-01

## 2023-09-22 RX ORDER — HYDROMORPHONE HYDROCHLORIDE 2 MG/1
2 TABLET ORAL
Qty: 60 | Refills: 0 | Status: ACTIVE | COMMUNITY
Start: 2023-09-22 | End: 1900-01-01

## 2023-09-22 RX ORDER — BUPRENORPHINE 10 UG/H
10 PATCH, EXTENDED RELEASE TRANSDERMAL
Qty: 4 | Refills: 0 | Status: DISCONTINUED | COMMUNITY
Start: 2023-09-01 | End: 2023-09-22

## 2023-09-22 RX ORDER — OXYCODONE 10 MG/1
10 TABLET ORAL
Qty: 180 | Refills: 0 | Status: DISCONTINUED | COMMUNITY
Start: 2023-06-14 | End: 2023-09-22

## 2023-09-28 ENCOUNTER — APPOINTMENT (OUTPATIENT)
Dept: HEMATOLOGY ONCOLOGY | Facility: CLINIC | Age: 63
End: 2023-09-28